# Patient Record
Sex: FEMALE | Race: WHITE | NOT HISPANIC OR LATINO | Employment: FULL TIME | URBAN - METROPOLITAN AREA
[De-identification: names, ages, dates, MRNs, and addresses within clinical notes are randomized per-mention and may not be internally consistent; named-entity substitution may affect disease eponyms.]

---

## 2017-01-13 ENCOUNTER — ALLIED HEALTH/NURSE VISIT (OUTPATIENT)
Dept: FAMILY MEDICINE | Facility: OTHER | Age: 22
End: 2017-01-13
Payer: COMMERCIAL

## 2017-01-13 VITALS — DIASTOLIC BLOOD PRESSURE: 66 MMHG | SYSTOLIC BLOOD PRESSURE: 100 MMHG

## 2017-01-13 DIAGNOSIS — Z30.9 ENCOUNTER FOR CONTRACEPTIVE MANAGEMENT, UNSPECIFIED CONTRACEPTIVE ENCOUNTER TYPE: Primary | ICD-10-CM

## 2017-01-13 PROCEDURE — 99207 ZZC NO CHARGE NURSE ONLY: CPT

## 2017-01-13 PROCEDURE — 96372 THER/PROPH/DIAG INJ SC/IM: CPT

## 2017-01-13 RX ORDER — MEDROXYPROGESTERONE ACETATE 150 MG/ML
150 INJECTION, SUSPENSION INTRAMUSCULAR
Qty: 1 ML | Refills: 3 | OUTPATIENT
Start: 2016-10-14 | End: 2017-09-18

## 2017-01-13 NOTE — NURSING NOTE
Chief Complaint   Patient presents with     Imm/Inj     depo     Prior to injection verified patient identity using patient's name and date of birth.  BLOOD PRESSURE: 100/66    DATE OF LAST PAP or ANNUAL EXAM: PAP      NIL   10/14/2016  URINE HCG:not indicated    The following medication was given:     MEDICATION: Depo Provera 150mg  ROUTE: IM  SITE: Deltoid - Left  : FloQast  LOT #: [id number]  EXPIRATION:2/2019  NEXT INJECTION DUE: 3/31-4/14  NDC: [address]-5  Provider: Stacey Bruno CMA ([facility])

## 2017-02-15 ENCOUNTER — MEDICAL CORRESPONDENCE (OUTPATIENT)
Dept: HEALTH INFORMATION MANAGEMENT | Facility: CLINIC | Age: 22
End: 2017-02-15

## 2017-02-15 ENCOUNTER — TELEPHONE (OUTPATIENT)
Dept: ENDOCRINOLOGY | Facility: CLINIC | Age: 22
End: 2017-02-15

## 2017-02-15 NOTE — TELEPHONE ENCOUNTER
CMN for Dexcom sensor faxed both Dexcom ([phone]) and [employer] ([phone]).    Anna Black RN, BSN, CDE   [facility]

## 2017-02-27 ENCOUNTER — MEDICAL CORRESPONDENCE (OUTPATIENT)
Dept: HEALTH INFORMATION MANAGEMENT | Facility: CLINIC | Age: 22
End: 2017-02-27

## 2017-03-13 ENCOUNTER — OFFICE VISIT (OUTPATIENT)
Dept: FAMILY MEDICINE | Facility: OTHER | Age: 22
End: 2017-03-13
Payer: COMMERCIAL

## 2017-03-13 ENCOUNTER — TELEPHONE (OUTPATIENT)
Dept: FAMILY MEDICINE | Facility: OTHER | Age: 22
End: 2017-03-13

## 2017-03-13 VITALS — SYSTOLIC BLOOD PRESSURE: 122 MMHG | DIASTOLIC BLOOD PRESSURE: 86 MMHG

## 2017-03-13 DIAGNOSIS — R53.81 MALAISE: ICD-10-CM

## 2017-03-13 DIAGNOSIS — J10.1 INFLUENZA A: Primary | ICD-10-CM

## 2017-03-13 LAB
FLUAV+FLUBV AG SPEC QL: ABNORMAL
FLUAV+FLUBV AG SPEC QL: POSITIVE
SPECIMEN SOURCE: ABNORMAL

## 2017-03-13 PROCEDURE — 99213 OFFICE O/P EST LOW 20 MIN: CPT | Performed by: PHYSICIAN ASSISTANT

## 2017-03-13 PROCEDURE — 87804 INFLUENZA ASSAY W/OPTIC: CPT | Performed by: PHYSICIAN ASSISTANT

## 2017-03-13 RX ORDER — OSELTAMIVIR PHOSPHATE 75 MG/1
75 CAPSULE ORAL 2 TIMES DAILY
Qty: 10 CAPSULE | Refills: 0 | Status: SHIPPED | OUTPATIENT
Start: 2017-03-13 | End: 2018-01-02

## 2017-03-13 ASSESSMENT — PAIN SCALES - GENERAL: PAINLEVEL: NO PAIN (0)

## 2017-03-13 NOTE — NURSING NOTE
"No chief complaint on file.      Initial /86 (BP Location: Right arm, Patient Position: Chair, Cuff Size: Adult Regular)  Pulse (P) 116  Temp (P) 97.8  F (36.6  C) (Oral)  Resp (P) 16  Ht (P) 5' 1.81\" (1.57 m)  Wt (P) 140 lb (63.5 kg)  SpO2 (P) 97%  BMI (P) 25.76 kg/m2 Estimated body mass index is 25.76 kg/(m^2) (pended) as calculated from the following:    Height as of this encounter: (P) 5' 1.81\" (1.57 m).    Weight as of this encounter: (P) 140 lb (63.5 kg).  Medication Reconciliation: complete  "

## 2017-03-13 NOTE — PATIENT INSTRUCTIONS
Influenza  Influenza ( the flu ) is an infection that affects your respiratory tract (the mouth, nose, and lungs, and the passages between them). Unlike a cold, the flu can make you very ill. And it can lead to pneumonia, a serious lung infection. For some people, especially older adults, young children, and people with certain chronic conditions, the flu can have serious complications and even be fatal.  What Are the Risk Factors for the Flu?     Viruses that cause influenza spread through the air in droplets when someone who has the flu coughs, sneezes, laughs, or talks.   Anyone can get the flu. But you re more likely to become infected if you:    Have a weakened immune system.    Work in a health care setting where you may be exposed to flu germs.    Live or work with someone who has the flu.    Haven t received an annual flu shot.  How Does the Flu Spread?  The flu is caused by viruses. The viruses spread through the air in droplets when someone who has the flu coughs, sneezes, laughs, or talks. You can become infected when you inhale these viruses directly. You can also become infected when you touch a surface on which the droplets have landed and then transfer the germs to your eyes, nose, or mouth. Touching used tissues, or sharing utensils, drinking glasses, or a toothbrush with an infected person can expose you to flu viruses, too.  What Are the Symptoms of the Flu?  Flu symptoms tend to come on quickly and may last a few days to a few weeks. They include:    Fever usually higher than 101 F  (38.3 C) and chills    Sore throat and headache    Dry cough    Runny nose    Tiredness and weakness    Muscle aches  Factors That Can Make Flu Worse  For some people, the flu can be very serious. The risk of complications is greater for:    Children under age 5.    Adults 65 years of age and older.    People with a chronic illness, such as diabetes or heart, kidney, or lung disease.    People who live in a nursing  home or long-term care facility.   How Is the Flu Treated?  Influenza usually improves after 7 days or so. In some cases, your health care provider may prescribe an antiviral medication. This may help you get well sooner. For the medication to help, you need to take it as soon as possible (ideally within 48 hours) after your symptoms start. If you develop pneumonia or other serious illness, hospital care may be needed.  Easing Flu Symptoms    Drink lots of fluids such as water, juice, and warm soup. A good rule is to drink enough so that you urinate your normal amount.    Get plenty of rest.    Ask your health care provider what to take for fever and pain.    Call your provider if your fever rises over 101 F (38.3 C) or you become dizzy, lightheaded, or short of breath.  Taking Steps to Protect Others    Wash your hands often, especially after coughing or sneezing. Or, clean your hands with an alcohol-based hand  containing at least 60 percent alcohol.    Cough or sneeze into a tissue. Then throw the tissue away and wash your hands. If you don t have a tissue, cough and sneeze into the crook of your elbow.    Stay home until at least 24 hours after you no longer have a fever or chills. Be sure the fever isn t being hidden by fever-reducing medication.    Don t share food, utensils, drinking glasses, or a toothbrush with others.    Ask your health care provider if others in your household should receive antiviral medication to help them avoid infection.  How Can the Flu Be Prevented?    One of the best ways to avoid the flu is to get a flu vaccination each year. Viruses that cause the flu change from year to year. For that reason, doctors recommend getting the flu vaccine each year, as soon as it's available in your area. The vaccine may be given as a shot or as a nasal spray. Your health care provider can tell you which vaccine is right for you.    Wash your hands often. Frequent handwashing is a proven way  to help prevent infection.    Carry an alcohol-based hand gel containing at least 60 percent alcohol. Use it when you don t have access to soap and water. Then wash your hands as soon as you can.    Avoid touching your eyes, nose, and mouth.    At home and work, clean phones, computer keyboards, and toys often with disinfectant wipes.    If possible, avoid close contact with others who have the flu or symptoms of the flu.  Handwashing Tips  Handwashing is one of the best ways to prevent many common infections. If you re caring for or visiting someone with the flu, wash your hands each time you enter and leave the room. Follow these steps:    Use warm water and plenty of soap. Rub your hands together well.    Clean the whole hand, under your nails, between your fingers, and up the wrists.    Wash for at least 15 seconds.    Rinse, letting the water run down your fingers, not up your wrists.    Dry your hands well. Use a paper towel to turn off the faucet and open the door.  Using Alcohol-Based Hand   Alcohol-based hand  are also a good choice. Use them when you don t have access to soap and water. Follow these steps:    Squeeze about a tablespoon of gel into the palm of one hand.    Rub your hands together briskly, cleaning the backs of your hands, the palms, between your fingers, and up the wrists.    Rub until the gel is gone and your hands are completely dry.  Preventing Influenza in Healthcare Settings  The flu is a special concern for people in hospitals and long-term care facilities. To help prevent the spread of flu, many hospitals and nursing homes take these steps:    Health care providers wash their hands or use an alcohol-based hand  before and after treating each patient.    People with the flu have private rooms and bathrooms or share a room with someone with the same infection.    High-risk patients who don t have the flu are encouraged to get the flu and pneumonia  vaccines.    All health care workers are encouraged or required to get flu shots.        5329-7719 [employer]. [address] [place] [address], [place] [address]. All rights reserved. This information is not intended as a substitute for professional medical care. Always follow your healthcare professional's instructions.        Influenza (Adult)    Influenza is also called the flu. It is a viral illness that affects the air passages of your lungs. It is different from the common cold. The flu can easily be passed from one to person to another. It may be spread through the air by coughing and sneezing. Or it can be spread by touching the sick person and then touching your own eyes, nose, or mouth.  The flu starts 1 to 3 days after you are exposed to the flu virus. It may last for 1 to 2 weeks. You usually don t need to take antibiotics unless you have a complication. This might be an ear or sinus infection or pneumonia.  Symptoms of the flu may be mild or severe. They can include extreme tiredness (wanting to stay in bed all day), chills, fevers, muscle aches, soreness with eye movement, headache, and a dry, hacking cough.  Home care  Follow these guidelines when caring for yourself at home:    Avoid being around cigarette smoke, whether yours or other people s.    Acetaminophen or ibuprofen will help ease your fever, muscle aches, and headache. Don t give aspirin to anyone younger than 18 who has the flu. Aspirin can harm the liver.    Nausea and loss of appetite are common with the flu. Eat light meals. Drink 6 to 8 glasses of liquids every day. Good choices are water, sport drinks, soft drinks without caffeine, juices, tea, and soup. Extra fluids will also help loosen secretions in your nose and lungs.    Over-the-counter cold medicines will not make the flu go away faster. But the medicines may help with coughing, sore throat, and congestion in your nose and sinuses. Don t use a decongestant if you have  high blood pressure.    Stay home until your fever has been gone for at least 24 hours without using medicine to reduce fever.  Follow-up care  Follow up with your health care provider, or as advised, if you are not getting better over the next week.  If you are 65 or older, talk with your provider about getting a pneumococcal vaccine every 5 years. You should also get this vaccine if you have chronic asthma or COPD. All adults should get a flu vaccine every fall. Ask your provider about this.  When to seek medical advice  Call your health care provider right away if any of these occur:    Cough with lots of colored sputum (mucus) or blood in your sputum    Chest pain, shortness of breath, wheezing, or difficulty breathing    Severe headache, or face, neck, or ear pain    New rash with fever    Fever of 101 F (38 C) oral or higher that doesn t get better with fever medicine    Confusion, behavior change, or seizure    Severe weakness or dizziness    You get a fever or cough after getting better for a few days       9496-0449 [employer]. [address] [place] [address], [place] [address]. All rights reserved. This information is not intended as a substitute for professional medical care. Always follow your healthcare professional's instructions.

## 2017-03-13 NOTE — MR AVS SNAPSHOT
After Visit Summary   3/13/2017    Feliciano Arreguin    MRN: [id number]           Patient Information     Date Of Birth          1995        Visit Information        Provider Department      3/13/2017 2:00 PM Debra Olivas PA-C [facility]        Today's Diagnoses     Influenza A    -  1    Malaise          Care Instructions      Influenza  Influenza ( the flu ) is an infection that affects your respiratory tract (the mouth, nose, and lungs, and the passages between them). Unlike a cold, the flu can make you very ill. And it can lead to pneumonia, a serious lung infection. For some people, especially older adults, young children, and people with certain chronic conditions, the flu can have serious complications and even be fatal.  What Are the Risk Factors for the Flu?     Viruses that cause influenza spread through the air in droplets when someone who has the flu coughs, sneezes, laughs, or talks.   Anyone can get the flu. But you re more likely to become infected if you:    Have a weakened immune system.    Work in a health care setting where you may be exposed to flu germs.    Live or work with someone who has the flu.    Haven t received an annual flu shot.  How Does the Flu Spread?  The flu is caused by viruses. The viruses spread through the air in droplets when someone who has the flu coughs, sneezes, laughs, or talks. You can become infected when you inhale these viruses directly. You can also become infected when you touch a surface on which the droplets have landed and then transfer the germs to your eyes, nose, or mouth. Touching used tissues, or sharing utensils, drinking glasses, or a toothbrush with an infected person can expose you to flu viruses, too.  What Are the Symptoms of the Flu?  Flu symptoms tend to come on quickly and may last a few days to a few weeks. They include:    Fever usually higher than 101 F  (38.3 C) and chills    Sore throat and  headache    Dry cough    Runny nose    Tiredness and weakness    Muscle aches  Factors That Can Make Flu Worse  For some people, the flu can be very serious. The risk of complications is greater for:    Children under age 5.    Adults 65 years of age and older.    People with a chronic illness, such as diabetes or heart, kidney, or lung disease.    People who live in a nursing home or long-term care facility.   How Is the Flu Treated?  Influenza usually improves after 7 days or so. In some cases, your health care provider may prescribe an antiviral medication. This may help you get well sooner. For the medication to help, you need to take it as soon as possible (ideally within 48 hours) after your symptoms start. If you develop pneumonia or other serious illness, hospital care may be needed.  Easing Flu Symptoms    Drink lots of fluids such as water, juice, and warm soup. A good rule is to drink enough so that you urinate your normal amount.    Get plenty of rest.    Ask your health care provider what to take for fever and pain.    Call your provider if your fever rises over 101 F (38.3 C) or you become dizzy, lightheaded, or short of breath.  Taking Steps to Protect Others    Wash your hands often, especially after coughing or sneezing. Or, clean your hands with an alcohol-based hand  containing at least 60 percent alcohol.    Cough or sneeze into a tissue. Then throw the tissue away and wash your hands. If you don t have a tissue, cough and sneeze into the crook of your elbow.    Stay home until at least 24 hours after you no longer have a fever or chills. Be sure the fever isn t being hidden by fever-reducing medication.    Don t share food, utensils, drinking glasses, or a toothbrush with others.    Ask your health care provider if others in your household should receive antiviral medication to help them avoid infection.  How Can the Flu Be Prevented?    One of the best ways to avoid the flu is to get a  flu vaccination each year. Viruses that cause the flu change from year to year. For that reason, doctors recommend getting the flu vaccine each year, as soon as it's available in your area. The vaccine may be given as a shot or as a nasal spray. Your health care provider can tell you which vaccine is right for you.    Wash your hands often. Frequent handwashing is a proven way to help prevent infection.    Carry an alcohol-based hand gel containing at least 60 percent alcohol. Use it when you don t have access to soap and water. Then wash your hands as soon as you can.    Avoid touching your eyes, nose, and mouth.    At home and work, clean phones, computer keyboards, and toys often with disinfectant wipes.    If possible, avoid close contact with others who have the flu or symptoms of the flu.  Handwashing Tips  Handwashing is one of the best ways to prevent many common infections. If you re caring for or visiting someone with the flu, wash your hands each time you enter and leave the room. Follow these steps:    Use warm water and plenty of soap. Rub your hands together well.    Clean the whole hand, under your nails, between your fingers, and up the wrists.    Wash for at least 15 seconds.    Rinse, letting the water run down your fingers, not up your wrists.    Dry your hands well. Use a paper towel to turn off the faucet and open the door.  Using Alcohol-Based Hand   Alcohol-based hand  are also a good choice. Use them when you don t have access to soap and water. Follow these steps:    Squeeze about a tablespoon of gel into the palm of one hand.    Rub your hands together briskly, cleaning the backs of your hands, the palms, between your fingers, and up the wrists.    Rub until the gel is gone and your hands are completely dry.  Preventing Influenza in Healthcare Settings  The flu is a special concern for people in hospitals and long-term care facilities. To help prevent the spread of flu, many  hospitals and nursing homes take these steps:    Health care providers wash their hands or use an alcohol-based hand  before and after treating each patient.    People with the flu have private rooms and bathrooms or share a room with someone with the same infection.    High-risk patients who don t have the flu are encouraged to get the flu and pneumonia vaccines.    All health care workers are encouraged or required to get flu shots.        0829-4426 The "[employer]". [address] [place] [address], [place] [address]. All rights reserved. This information is not intended as a substitute for professional medical care. Always follow your healthcare professional's instructions.        Influenza (Adult)    Influenza is also called the flu. It is a viral illness that affects the air passages of your lungs. It is different from the common cold. The flu can easily be passed from one to person to another. It may be spread through the air by coughing and sneezing. Or it can be spread by touching the sick person and then touching your own eyes, nose, or mouth.  The flu starts 1 to 3 days after you are exposed to the flu virus. It may last for 1 to 2 weeks. You usually don t need to take antibiotics unless you have a complication. This might be an ear or sinus infection or pneumonia.  Symptoms of the flu may be mild or severe. They can include extreme tiredness (wanting to stay in bed all day), chills, fevers, muscle aches, soreness with eye movement, headache, and a dry, hacking cough.  Home care  Follow these guidelines when caring for yourself at home:    Avoid being around cigarette smoke, whether yours or other people s.    Acetaminophen or ibuprofen will help ease your fever, muscle aches, and headache. Don t give aspirin to anyone younger than 18 who has the flu. Aspirin can harm the liver.    Nausea and loss of appetite are common with the flu. Eat light meals. Drink 6 to 8 glasses of liquids every day. Good  choices are water, sport drinks, soft drinks without caffeine, juices, tea, and soup. Extra fluids will also help loosen secretions in your nose and lungs.    Over-the-counter cold medicines will not make the flu go away faster. But the medicines may help with coughing, sore throat, and congestion in your nose and sinuses. Don t use a decongestant if you have high blood pressure.    Stay home until your fever has been gone for at least 24 hours without using medicine to reduce fever.  Follow-up care  Follow up with your health care provider, or as advised, if you are not getting better over the next week.  If you are 65 or older, talk with your provider about getting a pneumococcal vaccine every 5 years. You should also get this vaccine if you have chronic asthma or COPD. All adults should get a flu vaccine every fall. Ask your provider about this.  When to seek medical advice  Call your health care provider right away if any of these occur:    Cough with lots of colored sputum (mucus) or blood in your sputum    Chest pain, shortness of breath, wheezing, or difficulty breathing    Severe headache, or face, neck, or ear pain    New rash with fever    Fever of 101 F (38 C) oral or higher that doesn t get better with fever medicine    Confusion, behavior change, or seizure    Severe weakness or dizziness    You get a fever or cough after getting better for a few days       1591-6348 [employer]). [address] [place] [address], [place] [id number]. All rights reserved. This information is not intended as a substitute for professional medical care. Always follow your healthcare professional's instructions.              Follow-ups after your visit        Who to contact     If you have questions or need follow up information about today's clinic visit or your schedule please contact [facility] directly at [phone].  Normal or non-critical lab and imaging results will be communicated to you by  MyChart, letter or phone within 4 business days after the clinic has received the results. If you do not hear from us within 7 days, please contact the clinic through [employer] or phone. If you have a critical or abnormal lab result, we will notify you by phone as soon as possible.  Submit refill requests through [employer] or call your pharmacy and they will forward the refill request to us. Please allow 3 business days for your refill to be completed.          Additional Information About Your Visit        [employer] Information     [employer] gives you secure access to your electronic health record. If you see a primary care provider, you can also send messages to your care team and make appointments. If you have questions, please call your primary care clinic.  If you do not have a primary care provider, please call [phone] and they will assist you.        Care EveryWhere ID     This is your Care EveryWhere ID. This could be used by other organizations to access your [place] medical records  [id number]         Blood Pressure from Last 3 Encounters:   03/13/17 122/86   01/13/17 100/66   10/14/16 118/70    Weight from Last 3 Encounters:   03/13/17 (P) 140 lb (63.5 kg)   10/14/16 135 lb (61.2 kg)   07/26/16 135 lb 1.6 oz (61.3 kg)              We Performed the Following     Influenza A/B antigen          Today's Medication Changes          These changes are accurate as of: 3/13/17  2:44 PM.  If you have any questions, ask your nurse or doctor.               Start taking these medicines.        Dose/Directions    oseltamivir 75 MG capsule   Commonly known as:  TAMIFLU   Used for:  Influenza A   Started by:  Debra Olivas PA-C        Dose:  75 mg   Take 1 capsule (75 mg) by mouth 2 times daily   Quantity:  10 capsule   Refills:  0            Where to get your medicines      These medications were sent to [facility] [address] [place] [id number] [facility]  [address] [facility] [address]  "    Phone:  [phone]     oseltamivir 75 MG capsule                Primary Care Provider Office Phone # Fax #    Stacey Sandoval PA-C [phone]       [facility] [address] [place] [address]        Thank you!     Thank you for choosing [facility]  for your care. Our goal is always to provide you with excellent care. Hearing back from our patients is one way we can continue to improve our services. Please take a few minutes to complete the written survey that you may receive in the mail after your visit with us. Thank you!             Your Updated Medication List - Protect others around you: Learn how to safely use, store and throw away your medicines at www.disposemymeds.org.          This list is accurate as of: 3/13/17  2:44 PM.  Always use your most recent med list.                   Brand Name Dispense Instructions for use    ACCU-CHEK ENE test strip   Generic drug:  blood glucose monitoring     400 Box    Use to test blood sugars up to 4 times daily.       blood glucose monitoring meter device kit    no brand specified    1 kit    1 kit 3 times daily.       Blood Pressure Kit     1 kit    1 kit daily       insulin glargine 100 UNIT/ML injection    LANTUS SOLOSTAR    3 Month    42 units daily       * insulin lispro 100 UNIT/ML injection    HumaLOG KWIKpen    3 Month    Inject 1 U per 5 grams CHO. Total daily dose ~ 40 U.       * insulin lispro 100 UNIT/ML injection    humaLOG    8 vial    Uses up to 75 units per day in insulin pump for basal, bolus, and priming of insulin pump tubing.       insulin pen needle 31G X 8 MM    B-D U/F    200 each    Use 6 daily or as directed.       insulin syringe-needle U-100 31G X 5/16\" 0.5 ML    GNP ULTRA COM INSULIN SYRINGE    720 each    Any brand, to be used with subcutaneous insulin injection 4-8 times/day.       medroxyPROGESTERone 150 MG/ML injection    DEPO-PROVERA    1 mL    Inject 1 mL (150 mg) into the muscle " every 3 months       oseltamivir 75 MG capsule    TAMIFLU    10 capsule    Take 1 capsule (75 mg) by mouth 2 times daily       * Notice:  This list has 2 medication(s) that are the same as other medications prescribed for you. Read the directions carefully, and ask your doctor or other care provider to review them with you.

## 2017-03-13 NOTE — PROGRESS NOTES
"  SUBJECTIVE:                                                    Feliciano Arreguin is a 21 year old female who presents to clinic today for the following health issues:      HPI    Acute Illness   Acute illness concerns: flu sx's  Onset: friday    Fever: YES- low grade    Chills/Sweats: YES    Headache (location?): YES    Sinus Pressure:YES    Conjunctivitis:  no    Ear Pain: no    Rhinorrhea: YES    Congestion: YES    Sore Throat: YES- from runny nose      - BGs since sick <200   - Checking frequently   - Drinking fluids, not much food   - No flu shot      Cough: YES-non-productive    Wheeze: no    Decreased Appetite: YES    Nausea: YES    Vomiting: no     Diarrhea:  no    Dysuria/Freq.: no    Fatigue/Achiness: YES    Sick/Strep Exposure: YES- boyfriend had a cold     Therapies Tried and outcome: dayquil    Problem list and histories reviewed & adjusted, as indicated.  Additional history: as documented    Labs reviewed in EPIC    ROS:  Constitutional, HEENT, cardiovascular, pulmonary, gi and gu systems are negative, except as otherwise noted.    OBJECTIVE:                                                    /86 (BP Location: Right arm, Patient Position: Chair, Cuff Size: Adult Regular)  Pulse (P) 116  Temp (P) 97.8  F (36.6  C) (Oral)  Resp (P) 16  Ht (P) 5' 1.81\" (1.57 m)  Wt (P) 140 lb (63.5 kg)  SpO2 (P) 97%  BMI (P) 25.76 kg/m2  Body mass index is 25.76 kg/(m^2) (pended).  GENERAL APPEARANCE: ill appearing, alert and no distress  EYES: Eyes grossly normal to inspection, PERRLA, conjunctivae and sclerae without injection or discharge, EOM intact   HENT: Bilateral ear canals without erythema or cerumen, bilateral TM's pearly grey with normal light reflex, no effusion, injection, or bulging, nasal turbinates without swelling, erythema, or discharge, mouth without ulcers or lesions, oropharynx clear and oral mucous membranes moist, no sinus tenderness   NECK: No adenopathy in cervical or supraclavicular " regions  RESP: Lungs clear to auscultation - no rales, rhonchi or wheezes   CV: Regular rates and rhythm, normal S1 S2, no S3 or S4, no murmur, click or rub  MS: No musculoskeletal defects are noted and gait is age appropriate without ataxia   SKIN: No suspicious lesions or rashes, hydration status appears adeuqate with normal skin turgor    PSYCH: Alert and oriented x3; speech- coherent , normal rate and volume; able to articulate logical thoughts, able to abstract reason, no tangential thoughts, no hallucinations or delusions, mentation appears normal, Mood is euthymic. Affect is appropriate for this mood state and bright. Thought content is free of suicidal ideation, hallucinations, and delusions. Dress is adequate and upkept. Eye contact is good during conversation.       Diagnostic Test Results:  Results for orders placed or performed in visit on 03/13/17 (from the past 24 hour(s))   Influenza A/B antigen   Result Value Ref Range    Influenza A/B Agn Specimen B*     Influenza A Positive (A) NEG    Influenza B  NEG     Negative   Test results must be correlated with clinical data. If necessary, results   should be confirmed by a molecular assay or viral culture.          ASSESSMENT/PLAN:                                                        ICD-10-CM    1. Influenza A J10.1 oseltamivir (TAMIFLU) 75 MG capsule   2. Malaise R53.81 Influenza A/B antigen     - Discussed lab findings   - Discussed Tamiflu use, duration, and side effects   - Conservative cares as needed   - Hand outs given  - Continue to check BGs frequently   - Discussed if signs of dehydration or uncontrolled BGs go to ED     The patient indicates understanding of these issues and agrees with the plan.    Follow up: PRN           Debra Olivas PA-C  [employer]

## 2017-03-13 NOTE — TELEPHONE ENCOUNTER
"Feliciano Arreguin is a 21 year old female who calls with cold and LUKE symptoms.    NURSING ASSESSMENT:  Description:  Patient calling today with stuffy nose, post nasal drip, intermittent sore throat, body aches, nausea, and \"hot flashes\". Afebrile with temp 99.2 yesterday. Drinking water - last urination was \"a couple hours ago\".   Onset/duration: Friday  Precip. factors:  Boyfriend had a cold  Associated symptoms:  See above  Improves/worsens symptoms:  Dayquil and water  Allergies:   Allergies   Allergen Reactions     Nkda [No Known Drug Allergies]      Seasonal Allergies      Spring/Fall       RECOMMENDED DISPOSITION:  Home care advice - Patient requested appointment as it \"doesn't feel like a regular cold\". Patient will be seen at [facility] in [place]  Will comply with recommendation: YES   If further questions/concerns or if Sx do not improve, worsen or new Sx develop, call your PCP or [facility] as soon as possible.    NOTES:  Disposition was determined by the first positive assessment question, therefore all previous assessment questions were negative.     Guideline used:  Telephone Triage Protocols for Nurses, Fourth Edition, Ashley José, ESVIN, BSN      "

## 2017-03-13 NOTE — TELEPHONE ENCOUNTER
Reason for call:  Same Day Appointment  Reason for Call:  Same Day Appointment, Requested Provider:  anyone in  or     PCP: Stacey Sandoval    Reason for visit: sore throat, stuffy nose     Duration of symptoms: 2days     Have you been treated for this in the past? No    Additional comments: Would like to be seen in  or  with anyone     Can we leave a detailed message on this number? YES    Phone number patient can be reached at: Home number on file [phone] (home)    Best Time: anytime    Call taken on 3/13/2017 at 9:31 AM by Ly Hatch

## 2017-04-04 ENCOUNTER — ALLIED HEALTH/NURSE VISIT (OUTPATIENT)
Dept: FAMILY MEDICINE | Facility: CLINIC | Age: 22
End: 2017-04-04
Payer: COMMERCIAL

## 2017-04-04 VITALS — DIASTOLIC BLOOD PRESSURE: 80 MMHG | SYSTOLIC BLOOD PRESSURE: 122 MMHG | WEIGHT: 138 LBS | BODY MASS INDEX: 25.4 KG/M2

## 2017-04-04 PROCEDURE — 96372 THER/PROPH/DIAG INJ SC/IM: CPT

## 2017-04-04 NOTE — MR AVS SNAPSHOT
After Visit Summary   4/4/2017    Feliciano Arreguin    MRN: [id number]           Patient Information     Date Of Birth          1995        Visit Information        Provider Department      4/4/2017 2:30 PM NL FLOAT TEAM D [facility]        Today's Diagnoses     Contraception    -  1       Follow-ups after your visit        Who to contact     If you have questions or need follow up information about today's clinic visit or your schedule please contact [facility] directly at [phone].  Normal or non-critical lab and imaging results will be communicated to you by [email or web address], letter or phone within 4 business days after the clinic has received the results. If you do not hear from us within 7 days, please contact the clinic through [email or web address] or phone. If you have a critical or abnormal lab result, we will notify you by phone as soon as possible.  Submit refill requests through [employer] or call your pharmacy and they will forward the refill request to us. Please allow 3 business days for your refill to be completed.          Additional Information About Your Visit        MyChart Information     [employer] gives you secure access to your electronic health record. If you see a primary care provider, you can also send messages to your care team and make appointments. If you have questions, please call your primary care clinic.  If you do not have a primary care provider, please call [phone] and they will assist you.        Care EveryWhere ID     This is your Care EveryWhere ID. This could be used by other organizations to access your [place] medical records  [id number]        Your Vitals Were     BMI (Body Mass Index)                   25.4 kg/m2            Blood Pressure from Last 3 Encounters:   04/04/17 122/80   03/13/17 122/86   01/13/17 100/66    Weight from Last 3 Encounters:   04/04/17 138 lb (62.6 kg)   03/13/17 (P) 140 lb (63.5 kg)   10/14/16 135 lb (61.2 kg)     "          We Performed the Following     INJECTION INTRAMUSCULAR OR SUB-Q     Medroxyprogesterone inj  1mg   (Depo Provera J-Code)        Primary Care Provider Office Phone # Fax #    Stacey Sandoval PA-C [phone]       [facility] [address] [place] [address]        Thank you!     Thank you for choosing [facility]  for your care. Our goal is always to provide you with excellent care. Hearing back from our patients is one way we can continue to improve our services. Please take a few minutes to complete the written survey that you may receive in the mail after your visit with us. Thank you!             Your Updated Medication List - Protect others around you: Learn how to safely use, store and throw away your medicines at www.disposemymeds.org.          This list is accurate as of: 4/4/17  2:47 PM.  Always use your most recent med list.                   Brand Name Dispense Instructions for use    ACCU-CHEK ENE test strip   Generic drug:  blood glucose monitoring     400 Box    Use to test blood sugars up to 4 times daily.       blood glucose monitoring meter device kit    no brand specified    1 kit    1 kit 3 times daily.       Blood Pressure Kit     1 kit    1 kit daily       insulin glargine 100 UNIT/ML injection    LANTUS SOLOSTAR    3 Month    42 units daily       * insulin lispro 100 UNIT/ML injection    HumaLOG KWIKpen    3 Month    Inject 1 U per 5 grams CHO. Total daily dose ~ 40 U.       * insulin lispro 100 UNIT/ML injection    humaLOG    8 vial    Uses up to 75 units per day in insulin pump for basal, bolus, and priming of insulin pump tubing.       insulin pen needle 31G X 8 MM    B-D U/F    200 each    Use 6 daily or as directed.       insulin syringe-needle U-100 31G X 5/16\" 0.5 ML    GNP ULTRA COM INSULIN SYRINGE    720 each    Any brand, to be used with subcutaneous insulin injection 4-8 times/day.       medroxyPROGESTERone 150 MG/ML " injection    DEPO-PROVERA    1 mL    Inject 1 mL (150 mg) into the muscle every 3 months       oseltamivir 75 MG capsule    TAMIFLU    10 capsule    Take 1 capsule (75 mg) by mouth 2 times daily       * Notice:  This list has 2 medication(s) that are the same as other medications prescribed for you. Read the directions carefully, and ask your doctor or other care provider to review them with you.

## 2017-04-04 NOTE — NURSING NOTE
Prior to injection verified patient identity using patient's name and date of birth.  Per orders of Stacey Sandoval  injection of Depo  given by Briana Valles MA. Patient instructed to remain in clinic for 20 minutes afterwards and to report any adverse reaction to me immediately.

## 2017-04-10 ENCOUNTER — MYC MEDICAL ADVICE (OUTPATIENT)
Dept: FAMILY MEDICINE | Facility: OTHER | Age: 22
End: 2017-04-10

## 2017-04-10 NOTE — LETTER
[facility]  [address] [place] [address]   [id number]  [phone]  Fax [phone]       April 10, 2017       RE Feliciano Arreguin     Dear TSA,     Feliciano Arreguin is an insulin dependent diabetic.  She uses an insulin pump.  She also will have some pump supplies, insulin, and syringes with needles.      Sincerely,        Stacey Sandoval PA-C

## 2017-06-22 ENCOUNTER — ALLIED HEALTH/NURSE VISIT (OUTPATIENT)
Dept: FAMILY MEDICINE | Facility: OTHER | Age: 22
End: 2017-06-22
Payer: COMMERCIAL

## 2017-06-22 PROCEDURE — 99207 ZZC NO CHARGE NURSE ONLY: CPT

## 2017-06-22 PROCEDURE — 96372 THER/PROPH/DIAG INJ SC/IM: CPT

## 2017-06-22 NOTE — NURSING NOTE
BLOOD PRESSURE: Data Unavailable    DATE OF LAST PAP or ANNUAL EXAM:   Lab Results   Component Value Date    PAP NIL 10/14/2016     URINE HCG:not indicated    The following medication was given:     MEDICATION: Depo Provera 150mg  ROUTE: IM  SITE: Deltoid - Right  : My [employer]  LOT #: [id number]   EXPIRATION:12/2019  NEXT INJECTION DUE: 9/7/2017 - 9/21/2017  Provider: PORSHA Sandoval

## 2017-06-22 NOTE — MR AVS SNAPSHOT
After Visit Summary   6/22/2017    Feliciano Arreguin    MRN: [id number]           Patient Information     Date Of Birth          1995        Visit Information        Provider Department      6/22/2017 1:30 PM NL FLOPJ NURSE [facility]        Today's Diagnoses     Contraception    -  1       Follow-ups after your visit        Who to contact     If you have questions or need follow up information about today's clinic visit or your schedule please contact [facility] directly at [phone].  Normal or non-critical lab and imaging results will be communicated to you by [employer]hart, letter or phone within 4 business days after the clinic has received the results. If you do not hear from us within 7 days, please contact the clinic through "[employer]t or phone. If you have a critical or abnormal lab result, we will notify you by phone as soon as possible.  Submit refill requests through [employer] or call your pharmacy and they will forward the refill request to us. Please allow 3 business days for your refill to be completed.          Additional Information About Your Visit        MyChart Information     [employer] gives you secure access to your electronic health record. If you see a primary care provider, you can also send messages to your care team and make appointments. If you have questions, please call your primary care clinic.  If you do not have a primary care provider, please call [phone] and they will assist you.        Care EveryWhere ID     This is your Care EveryWhere ID. This could be used by other organizations to access your [place] medical records  [id number]         Blood Pressure from Last 3 Encounters:   04/04/17 122/80   03/13/17 122/86   01/13/17 100/66    Weight from Last 3 Encounters:   04/04/17 138 lb (62.6 kg)   03/13/17 (P) 140 lb (63.5 kg)   10/14/16 135 lb (61.2 kg)              We Performed the Following     Medroxyprogesterone inj/1mg (Depo  Provera J-Priscila)        Primary Care Provider Office Phone # Fax #    Stacey Sandoval PA-C [phone]       [facility] [address] [place] [address]        Equal Access to Services     MIL CARPIO : Hadii aad ku hadasho Soomaali, waaxda luqadaha, qaybta kaalmada adeegyada, waxkerline idiin hayaan adepreet myers lanattymaribeth yamilet. So [facility] [phone].    ATENCIÓN: Si habla español, tiene a theodore disposición servicios gratuitos de asistencia lingüística. Llame al [phone].    We comply with applicable federal civil rights laws and [place] laws. We do not discriminate on the basis of race, color, national origin, age, disability sex, sexual orientation or gender identity.            Thank you!     Thank you for choosing [facility]  for your care. Our goal is always to provide you with excellent care. Hearing back from our patients is one way we can continue to improve our services. Please take a few minutes to complete the written survey that you may receive in the mail after your visit with us. Thank you!             Your Updated Medication List - Protect others around you: Learn how to safely use, store and throw away your medicines at www.disposemymeds.org.          This list is accurate as of: 6/22/17  1:59 PM.  Always use your most recent med list.                   Brand Name Dispense Instructions for use Diagnosis    ACCU-CHEK ENE test strip   Generic drug:  blood glucose monitoring     400 Box    Use to test blood sugars up to 4 times daily.    Type 1 diabetes mellitus, uncontrolled (H)       blood glucose monitoring meter device kit    no brand specified    1 kit    1 kit 3 times daily.    Type 1 diabetes, HbA1c goal < 7% (H)       Blood Pressure Kit     1 kit    1 kit daily    Secondary hypertension, unspecified       insulin glargine 100 UNIT/ML injection    LANTUS SOLOSTAR    3 Month    42 units daily    Type 1 diabetes, HbA1c goal < 7% (H)       * insulin lispro  "100 UNIT/ML injection    HumaLOG ALEJANDRApen    3 Month    Inject 1 U per 5 grams CHO. Total daily dose ~ 40 U.    Type 1 diabetes, HbA1c goal < 7% (H)       * insulin lispro 100 UNIT/ML injection    humaLOG    8 vial    Uses up to 75 units per day in insulin pump for basal, bolus, and priming of insulin pump tubing.    Type 1 diabetes mellitus not at goal (H)       insulin pen needle 31G X 8 MM    B-D U/F    200 each    Use 6 daily or as directed.    Type 1 diabetes, HbA1c goal < 7% (H)       insulin syringe-needle U-100 31G X 5/16\" 0.5 ML    GNP ULTRA COM INSULIN SYRINGE    720 each    Any brand, to be used with subcutaneous insulin injection 4-8 times/day.    Type 1 diabetes, HbA1c goal < 7% (H)       medroxyPROGESTERone 150 MG/ML injection    DEPO-PROVERA    1 mL    Inject 1 mL (150 mg) into the muscle every 3 months    Encounter for contraceptive management, unspecified contraceptive encounter type       oseltamivir 75 MG capsule    TAMIFLU    10 capsule    Take 1 capsule (75 mg) by mouth 2 times daily    Influenza A       * Notice:  This list has 2 medication(s) that are the same as other medications prescribed for you. Read the directions carefully, and ask your doctor or other care provider to review them with you.      "

## 2017-07-11 ENCOUNTER — TELEPHONE (OUTPATIENT)
Dept: ENDOCRINOLOGY | Facility: CLINIC | Age: 22
End: 2017-07-11

## 2017-07-11 NOTE — TELEPHONE ENCOUNTER
Left vm advising pt we will sign form from [employer] [facility] for sensor refills but pt needs to call clinic to schedule an appt as it has been 1 yr since we last saw pt. Last visit was 07/26/17. Pt advised we will be nnable to do future refills until pt is seen in clinic.      Anna Black RN, BSN, CDE   [facility]

## 2017-09-18 ENCOUNTER — TRANSFERRED RECORDS (OUTPATIENT)
Dept: HEALTH INFORMATION MANAGEMENT | Facility: CLINIC | Age: 22
End: 2017-09-18

## 2017-09-18 ENCOUNTER — TELEPHONE (OUTPATIENT)
Dept: FAMILY MEDICINE | Facility: CLINIC | Age: 22
End: 2017-09-18

## 2017-09-18 DIAGNOSIS — Z30.42 ENCOUNTER FOR SURVEILLANCE OF INJECTABLE CONTRACEPTIVE: Primary | ICD-10-CM

## 2017-09-18 RX ORDER — MEDROXYPROGESTERONE ACETATE 150 MG/ML
150 INJECTION, SUSPENSION INTRAMUSCULAR
Qty: 1 ML | Refills: 0 | OUTPATIENT
Start: 2017-09-18 | End: 2019-02-14

## 2017-09-18 NOTE — TELEPHONE ENCOUNTER
"Patient is on the float schedule for Thursday 09/21 for her Depo injection. There are no current orders for this.    Will you please place order for depo for one time only? She is due for physical around 10/2017.    Last order placed 10/14/2016 for 1 ML with \"three refills.\" Last OV 03/13/2017 with CDL for acute illness.    Please review/advise. Depo order pended.  Alicia Vickers CMA    "

## 2017-09-21 ENCOUNTER — ALLIED HEALTH/NURSE VISIT (OUTPATIENT)
Dept: FAMILY MEDICINE | Facility: OTHER | Age: 22
End: 2017-09-21
Payer: COMMERCIAL

## 2017-09-21 VITALS — DIASTOLIC BLOOD PRESSURE: 68 MMHG | BODY MASS INDEX: 25.58 KG/M2 | SYSTOLIC BLOOD PRESSURE: 128 MMHG | WEIGHT: 139 LBS

## 2017-09-21 PROCEDURE — 96372 THER/PROPH/DIAG INJ SC/IM: CPT

## 2017-09-21 PROCEDURE — 99207 ZZC NO CHARGE LOS: CPT

## 2017-09-21 NOTE — NURSING NOTE
Discharged home. Doing well.    Pt came in for depo injection.  BP was elevated when first taken.  Will have pt wait 5-10 minutes and recheck.  Verito Velasquez, CMA

## 2017-09-21 NOTE — NURSING NOTE
Prior to injection verified patient identity using patient's name and date of birth.Patient instructed to remain in clinic for 20 minutes afterwards, and to report any adverse reaction to me immediately. Verito Velasquez CMA

## 2017-09-21 NOTE — MR AVS SNAPSHOT
After Visit Summary   9/21/2017    Feliciano Arreguin    MRN: [id number]           Patient Information     Date Of Birth          1995        Visit Information        Provider Department      9/21/2017 4:00 PM VIVIEN BARROW TEAM B, [facility]        Today's Diagnoses     Contraception    -  1       Follow-ups after your visit        Who to contact     If you have questions or need follow up information about today's clinic visit or your schedule please contact [facility] directly at [phone].  Normal or non-critical lab and imaging results will be communicated to you by [employer]hart, letter or phone within 4 business days after the clinic has received the results. If you do not hear from us within 7 days, please contact the clinic through [employer] or phone. If you have a critical or abnormal lab result, we will notify you by phone as soon as possible.  Submit refill requests through [employer] or call your pharmacy and they will forward the refill request to us. Please allow 3 business days for your refill to be completed.          Additional Information About Your Visit        MyChart Information     [employer] gives you secure access to your electronic health record. If you see a primary care provider, you can also send messages to your care team and make appointments. If you have questions, please call your primary care clinic.  If you do not have a primary care provider, please call [phone] and they will assist you.        Care EveryWhere ID     This is your Care EveryWhere ID. This could be used by other organizations to access your [place] medical records  [id number]        Your Vitals Were     BMI (Body Mass Index)                   25.58 kg/m2            Blood Pressure from Last 3 Encounters:   09/21/17 128/68   04/04/17 122/80   03/13/17 122/86    Weight from Last 3 Encounters:   09/21/17 139 lb (63 kg)   04/04/17 138 lb (62.6 kg)   03/13/17 (P) 140 lb (63.5 kg)               We Performed the Following     INJECTION INTRAMUSCULAR OR SUB-Q     Medroxyprogesterone inj/1mg (Depo Provera J-Code)        Primary Care Provider Office Phone # Fax #    Stacey Sandoval PA-C [phone] [address] [place] [address]        Equal Access to Services     [facility]: Hadii aad ku hadasho Soomaali, waaxda luqadaha, qaybta kaalmada adeegyada, waxay idiin hayaan adepreet ymers lanattyn . So [facility] [phone].    ATENCIÓN: Si habla español, tiene a theodore disposición servicios gratuitos de asistencia lingüística. Llame al [phone].    We comply with applicable federal civil rights laws and [place] laws. We do not discriminate on the basis of race, color, national origin, age, disability sex, sexual orientation or gender identity.            Thank you!     Thank you for choosing [facility] [place] [facility]  for your care. Our goal is always to provide you with excellent care. Hearing back from our patients is one way we can continue to improve our services. Please take a few minutes to complete the written survey that you may receive in the mail after your visit with us. Thank you!             Your Updated Medication List - Protect others around you: Learn how to safely use, store and throw away your medicines at www.disposemymeds.org.          This list is accurate as of: 9/21/17  6:43 PM.  Always use your most recent med list.                   Brand Name Dispense Instructions for use Diagnosis    ACCU-CHEK ENE test strip   Generic drug:  blood glucose monitoring     400 Box    Use to test blood sugars up to 4 times daily.    Type 1 diabetes mellitus, uncontrolled (H)       blood glucose monitoring meter device kit    no brand specified    1 kit    1 kit 3 times daily.    Type 1 diabetes, HbA1c goal < 7% (H)       Blood Pressure Kit     1 kit    1 kit daily    Secondary hypertension, unspecified       insulin glargine 100 UNIT/ML injection    LANTUS SOLOSTAR     "3 Month    42 units daily    Type 1 diabetes, HbA1c goal < 7% (H)       * insulin lispro 100 UNIT/ML injection    HumaLOG KWIKpen    3 Month    Inject 1 U per 5 grams CHO. Total daily dose ~ 40 U.    Type 1 diabetes, HbA1c goal < 7% (H)       * insulin lispro 100 UNIT/ML injection    humaLOG    8 vial    Uses up to 75 units per day in insulin pump for basal, bolus, and priming of insulin pump tubing.    Type 1 diabetes mellitus not at goal (H)       insulin pen needle 31G X 8 MM    B-D U/F    200 each    Use 6 daily or as directed.    Type 1 diabetes, HbA1c goal < 7% (H)       insulin syringe-needle U-100 31G X 5/16\" 0.5 ML    GNP ULTRA COM INSULIN SYRINGE    720 each    Any brand, to be used with subcutaneous insulin injection 4-8 times/day.    Type 1 diabetes, HbA1c goal < 7% (H)       medroxyPROGESTERone 150 MG/ML injection    DEPO-PROVERA    1 mL    Inject 1 mL (150 mg) into the muscle every 3 months    Encounter for surveillance of injectable contraceptive       oseltamivir 75 MG capsule    TAMIFLU    10 capsule    Take 1 capsule (75 mg) by mouth 2 times daily    Influenza A       * Notice:  This list has 2 medication(s) that are the same as other medications prescribed for you. Read the directions carefully, and ask your doctor or other care provider to review them with you.      "

## 2017-09-22 NOTE — TELEPHONE ENCOUNTER
Noted. Patient had Depo on 09/21. Needs Physical before any additional refills.  Alicia Vickers, CMA

## 2017-10-03 DIAGNOSIS — E10.9 TYPE 1 DIABETES MELLITUS NOT AT GOAL (H): ICD-10-CM

## 2017-10-03 NOTE — TELEPHONE ENCOUNTER
Left message for patient to return call.    Last Appt 7/26/16.  Future: NONE    Brandy Rodriguez LPN  Adult Endocrinology  [facility]

## 2017-10-03 NOTE — TELEPHONE ENCOUNTER
----- Message from Daksha Rangel RN sent at 10/3/2017 12:27 PM CDT -----  Regarding: refill request   MG patient   Will file in chart as: HUMALOG 100 UNIT/ML injection  USES UP TO 75 UNITS PER DAY IN INSULIN PUMP FOR BASAL, BOLUS, AND PRIMING OF INSULIN PUMP TUBING  Disp: Not specified (Pharmacy requested 80 each)  Refills: 3    Class: E-Prescribe Start: 10/3/2017  For: Type 1 diabetes mellitus not at goal (H)  Originally ordered: 1 year ago by Eda De Dios MD  Last refill:6/19/2017  To be filled at: [facility] [address] [place] [address] [facility]: [phone]

## 2017-10-30 ENCOUNTER — TELEPHONE (OUTPATIENT)
Dept: FAMILY MEDICINE | Facility: OTHER | Age: 22
End: 2017-10-30

## 2017-10-30 ENCOUNTER — ALLIED HEALTH/NURSE VISIT (OUTPATIENT)
Dept: FAMILY MEDICINE | Facility: OTHER | Age: 22
End: 2017-10-30
Payer: COMMERCIAL

## 2017-10-30 DIAGNOSIS — Z86.19 HISTORY OF CHICKEN POX: Primary | ICD-10-CM

## 2017-10-30 DIAGNOSIS — Z23 NEED FOR VACCINATION: ICD-10-CM

## 2017-10-30 DIAGNOSIS — Z11.1 SCREENING EXAMINATION FOR PULMONARY TUBERCULOSIS: ICD-10-CM

## 2017-10-30 PROCEDURE — 99207 ZZC NO CHARGE NURSE ONLY: CPT

## 2017-10-30 PROCEDURE — 90715 TDAP VACCINE 7 YRS/> IM: CPT

## 2017-10-30 PROCEDURE — 90471 IMMUNIZATION ADMIN: CPT

## 2017-10-30 PROCEDURE — 86480 TB TEST CELL IMMUN MEASURE: CPT | Performed by: PHYSICIAN ASSISTANT

## 2017-10-30 PROCEDURE — 86787 VARICELLA-ZOSTER ANTIBODY: CPT | Performed by: PHYSICIAN ASSISTANT

## 2017-10-30 PROCEDURE — 36415 COLL VENOUS BLD VENIPUNCTURE: CPT | Performed by: PHYSICIAN ASSISTANT

## 2017-10-30 NOTE — NURSING NOTE
Chief Complaint   Patient presents with     Imm/Inj     tdap     Prior to injection verified patient identity using patient's name and date of birth.  Screening Questionnaire for Adult Immunization    Are you sick today?   No   Do you have allergies to medications, food, a vaccine component or latex?   No   Have you ever had a serious reaction after receiving a vaccination?   No   Do you have a long-term health problem with heart disease, lung disease, asthma, kidney disease, metabolic disease (e.g. diabetes), anemia, or other blood disorder?   No   Do you have cancer, leukemia, HIV/AIDS, or any other immune system problem?   No   In the past 3 months, have you taken medications that affect  your immune system, such as prednisone, other steroids, or anticancer drugs; drugs for the treatment of rheumatoid arthritis, Crohn s disease, or psoriasis; or have you had radiation treatments?   Yes   Have you had a seizure, or a brain or other nervous system problem?   No   During the past year, have you received a transfusion of blood or blood     products, or been given immune (gamma) globulin or antiviral drug?   No   For women: Are you pregnant or is there a chance you could become        pregnant during the next month?   No   Have you received any vaccinations in the past 4 weeks?   No     Immunization questionnaire was positive for at least one answer.  Notified provider ok to give.        Per orders of Stacey Sandoval, injection of Tdap given by Myra Bruno. Patient instructed to remain in clinic for 15 minutes afterwards, and to report any adverse reaction to me immediately.       Screening performed by Myra Bruno on 10/30/2017 at 2:41 PM.

## 2017-10-30 NOTE — MR AVS SNAPSHOT
After Visit Summary   10/30/2017    Feliciano Arreguin    MRN: [id number]           Patient Information     Date Of Birth          1995        Visit Information        Provider Department      10/30/2017 9:30 AM NL FLOAT NURSE [facility]        Today's Diagnoses     History of chicken pox    -  1    Screening examination for pulmonary tuberculosis        Need for vaccination           Follow-ups after your visit        Your next 10 appointments already scheduled     Jan 02, 2018 11:30 AM CST   Return Visit with Eda De Dios MD   [facility] ([facility])    [address] [place] [address] [phone]              Who to contact     If you have questions or need follow up information about today's clinic visit or your schedule please contact [facility] directly at [phone].  Normal or non-critical lab and imaging results will be communicated to you by MyChart, letter or phone within 4 business days after the clinic has received the results. If you do not hear from us within 7 days, please contact the clinic through [employer] or phone. If you have a critical or abnormal lab result, we will notify you by phone as soon as possible.  Submit refill requests through [employer] or call your pharmacy and they will forward the refill request to us. Please allow 3 business days for your refill to be completed.          Additional Information About Your Visit        MyChart Information     [employer] gives you secure access to your electronic health record. If you see a primary care provider, you can also send messages to your care team and make appointments. If you have questions, please call your primary care clinic.  If you do not have a primary care provider, please call [phone] and they will assist you.        Care EveryWhere ID     This is your Care EveryWhere ID. This could be used by other organizations to  access your [place] medical records  [id number]         Blood Pressure from Last 3 Encounters:   09/21/17 128/68   04/04/17 122/80   03/13/17 122/86    Weight from Last 3 Encounters:   09/21/17 139 lb (63 kg)   04/04/17 138 lb (62.6 kg)   03/13/17 (P) 140 lb (63.5 kg)              We Performed the Following     1st  Administration  [99795]     M Tuberculosis by Quantiferon     TDAP VACCINE (ADACEL) [37929.002]     Varicella Zoster Virus Antibody IgG        Primary Care Provider Office Phone # Fax #    Stacey Sandoval PA-C [phone] [address] [place] [address]        Equal Access to Services     [facility] SHIRIN : Hadii aad ku hadasho Soomaali, waaxda luqadaha, qaybta kaalmada adeegyada, waxay ursulain jacqueline pierre . So [facility] [phone].    ATENCIÓN: Si habla español, tiene a theodore disposición servicios gratuitos de asistencia lingüística. LlFort [facility] [phone].    We comply with applicable federal civil rights laws and [place] laws. We do not discriminate on the basis of race, color, national origin, age, disability, sex, sexual orientation, or gender identity.            Thank you!     Thank you for choosing [facility]  for your care. Our goal is always to provide you with excellent care. Hearing back from our patients is one way we can continue to improve our services. Please take a few minutes to complete the written survey that you may receive in the mail after your visit with us. Thank you!             Your Updated Medication List - Protect others around you: Learn how to safely use, store and throw away your medicines at www.disposemymeds.org.          This list is accurate as of: 10/30/17  3:00 PM.  Always use your most recent med list.                   Brand Name Dispense Instructions for use Diagnosis    ACCU-CHEK ENE test strip   Generic drug:  blood glucose monitoring     400 Box    Use to test blood sugars up to 4 times daily.    Type 1  "diabetes mellitus, uncontrolled (H)       blood glucose monitoring meter device kit    no brand specified    1 kit    1 kit 3 times daily.    Type 1 diabetes, HbA1c goal < 7% (H)       Blood Pressure Kit     1 kit    1 kit daily    Secondary hypertension, unspecified       insulin glargine 100 UNIT/ML injection    LANTUS SOLOSTAR    3 Month    42 units daily    Type 1 diabetes, HbA1c goal < 7% (H)       * insulin lispro 100 UNIT/ML injection    HumaLOG KWIKpen    3 Month    Inject 1 U per 5 grams CHO. Total daily dose ~ 40 U.    Type 1 diabetes, HbA1c goal < 7% (H)       * insulin lispro 100 UNIT/ML injection    humaLOG    8 vial    Uses up to 75 units per day in insulin pump for basal, bolus, and priming of insulin pump tubing.    Type 1 diabetes mellitus not at goal (H)       insulin pen needle 31G X 8 MM    B-D U/F    200 each    Use 6 daily or as directed.    Type 1 diabetes, HbA1c goal < 7% (H)       insulin syringe-needle U-100 31G X 5/16\" 0.5 ML    GNP ULTRA COM INSULIN SYRINGE    720 each    Any brand, to be used with subcutaneous insulin injection 4-8 times/day.    Type 1 diabetes, HbA1c goal < 7% (H)       medroxyPROGESTERone 150 MG/ML injection    DEPO-PROVERA    1 mL    Inject 1 mL (150 mg) into the muscle every 3 months    Encounter for surveillance of injectable contraceptive       oseltamivir 75 MG capsule    TAMIFLU    10 capsule    Take 1 capsule (75 mg) by mouth 2 times daily    Influenza A       * Notice:  This list has 2 medication(s) that are the same as other medications prescribed for you. Read the directions carefully, and ask your doctor or other care provider to review them with you.      "

## 2017-10-30 NOTE — TELEPHONE ENCOUNTER
Huddled with provider about form for school, pt will need appt to fill out. Left message for pt to return call, when call is returned help schedule OV for school physical.    Myra Bruno CMA ([facility])

## 2017-10-30 NOTE — TELEPHONE ENCOUNTER
Reason for call:  Form  Reason for Call:  Form, our goal is to have forms completed with 72 hours, however, some forms may require a visit or additional information.    Type of letter, form or note:  school       Who is the form from?: [facility] (if other please explain)    Where did the form come from: Patient or family brought in       What clinic location was the form placed at?: [facility] - [phone]    Where the form was placed: 's Box    What number is listed as a contact on the form?: Feliciano Arreguin         Additional comments: pt is wondering if provider can sign/complete form    Call taken on 10/30/2017 at 2:54 PM by Myra Bruno      Titers were done today for TB gold and chicken pox as pt is up to date on MMR, Hep B and flu shot. Tdap was updated today. Titers were ordered under PCP. Pt would like to  form, updated list of vaccines and titer results when they are completed.    Myra Bruno, THONG (AAMA)

## 2017-10-31 LAB — VZV IGG SER QL IA: >8 AI (ref 0–0.8)

## 2017-11-01 LAB
M TB TUBERC IFN-G BLD QL: NEGATIVE
M TB TUBERC IFN-G/MITOGEN IGNF BLD: 0.01 IU/ML

## 2017-11-01 NOTE — PROGRESS NOTES
SUBJECTIVE:   CC: Feliciano Arreguin is an 22 year old woman who presents for preventive health visit.     Physical   Annual:     Getting at least 3 servings of Calcium per day::  Yes    Bi-annual eye exam::  Yes    Dental care twice a year::  NO    Sleep apnea or symptoms of sleep apnea::  None    Diet::  Regular (no restrictions)    Frequency of exercise::  1 day/week    Duration of exercise::  15-30 minutes    Taking medications regularly::  Yes    Medication side effects::  None    Additional concerns today::  No    Answers for HPI/ROS submitted by the patient on 11/7/2017   PHQ-2 Score: 0      Today's PHQ-2 Score:   PHQ-2 ( 1999 Pf[employer]) 11/7/2017   Q1: Little interest or pleasure in doing things 0   Q2: Feeling down, depressed or hopeless 0   PHQ-2 Score 0   Q1: Little interest or pleasure in doing things Not at all   Q2: Feeling down, depressed or hopeless Not at all   PHQ-2 Score 0       Abuse: Current or Past(Physical, Sexual or Emotional)- No  Do you feel safe in your environment - Yes    Social History   Substance Use Topics     Smoking status: Never Smoker     Smokeless tobacco: Never Used      Comment: no smokers in household     Alcohol use No     The patient does not drink >3 drinks per day nor >7 drinks per week.    Reviewed orders with patient.  Reviewed health maintenance and updated orders accordingly - Yes  Labs reviewed in EPIC    Mammogram not appropriate for this patient based on age.    Pertinent mammograms are reviewed under the imaging tab.  History of abnormal Pap smear: NO - age 21-29 PAP every 3 years recommended    Reviewed and updated as needed this visit by clinical staff  Tobacco  Allergies  Med Hx  Surg Hx  Fam Hx  Soc Hx        Reviewed and updated as needed this visit by Provider        Past Medical History:   Diagnosis Date     Diabetes mellitus (H) 1998    Type 1     Diabetic eye exam (H) 08/09/2013    [facility]      Past Surgical History:   Procedure Laterality  "Date     EYE SURGERY      lazy eye correction (strabismus?)       Review of Systems  C: NEGATIVE for fever, chills, change in weight  I: NEGATIVE for worrisome rashes, moles or lesions  E: NEGATIVE for vision changes or irritation  ENT: NEGATIVE for ear, mouth and throat problems  R: NEGATIVE for significant cough or SOB  B: NEGATIVE for masses, tenderness or discharge  CV: NEGATIVE for chest pain, palpitations or peripheral edema  GI: NEGATIVE for nausea, abdominal pain, heartburn, or change in bowel habits  : NEGATIVE for unusual urinary or vaginal symptoms. Periods are regular.  M: NEGATIVE for significant arthralgias or myalgia  N: NEGATIVE for weakness, dizziness or paresthesias  P: NEGATIVE for changes in mood or affect     OBJECTIVE:   /84  Pulse 116  Temp 98  F (36.7  C) (Temporal)  Resp 12  Ht 5' 1.61\" (1.565 m)  Wt 142 lb 8 oz (64.6 kg)  BMI 26.39 kg/m2  Physical Exam  GENERAL: healthy, alert and no distress  EYES: Eyes grossly normal to inspection, PERRL and conjunctivae and sclerae normal  HENT: ear canals and TM's normal, nose and mouth without ulcers or lesions  NECK: no adenopathy, no asymmetry, masses, or scars and thyroid normal to palpation  RESP: lungs clear to auscultation - no rales, rhonchi or wheezes  CV: regular rate and rhythm, normal S1 S2, no S3 or S4, no murmur, click or rub, no peripheral edema and peripheral pulses strong  ABDOMEN: soft, nontender, no hepatosplenomegaly, no masses and bowel sounds normal  MS: no gross musculoskeletal defects noted, no edema  SKIN: no suspicious lesions or rashes  NEURO: Normal strength and tone, mentation intact and speech normal  PSYCH: mentation appears normal, affect normal/bright    ASSESSMENT/PLAN:   1. Encounter for routine adult health examination without abnormal findings  Paperwork for college filled out for patient.     2. Type 1 diabetes mellitus not at goal (H)  Follows with endocrinology.       COUNSELING:  Reviewed preventive " "health counseling, as reflected in patient instructions       Regular exercise       Healthy diet/nutrition    BP Screening:   Last 3 BP Readings:    BP Readings from Last 3 Encounters:   11/07/17 124/84   09/21/17 128/68   04/04/17 122/80        reports that she has never smoked. She has never used smokeless tobacco.    Estimated body mass index is 26.39 kg/(m^2) as calculated from the following:    Height as of this encounter: 5' 1.61\" (1.565 m).    Weight as of this encounter: 142 lb 8 oz (64.6 kg).   Weight management plan: Discussed healthy diet and exercise guidelines and patient will follow up in 12 months in clinic to re-evaluate.    Counseling Resources:  ATP IV Guidelines  Pooled Cohorts Equation Calculator  Breast Cancer Risk Calculator  FRAX Risk Assessment  ICSI Preventive Guidelines  Dietary Guidelines for Americans, 2010  USDA's MyPlate  ASA Prophylaxis  Lung CA Screening    CLEMENCIA [facility]  "

## 2017-11-06 ENCOUNTER — MYC MEDICAL ADVICE (OUTPATIENT)
Dept: FAMILY MEDICINE | Facility: OTHER | Age: 22
End: 2017-11-06

## 2017-11-07 ENCOUNTER — OFFICE VISIT (OUTPATIENT)
Dept: FAMILY MEDICINE | Facility: OTHER | Age: 22
End: 2017-11-07
Payer: COMMERCIAL

## 2017-11-07 VITALS
DIASTOLIC BLOOD PRESSURE: 84 MMHG | WEIGHT: 142.5 LBS | HEIGHT: 62 IN | HEART RATE: 116 BPM | RESPIRATION RATE: 12 BRPM | BODY MASS INDEX: 26.22 KG/M2 | TEMPERATURE: 98 F | SYSTOLIC BLOOD PRESSURE: 124 MMHG

## 2017-11-07 DIAGNOSIS — Z00.00 ENCOUNTER FOR ROUTINE ADULT HEALTH EXAMINATION WITHOUT ABNORMAL FINDINGS: Primary | ICD-10-CM

## 2017-11-07 DIAGNOSIS — E10.9 TYPE 1 DIABETES MELLITUS NOT AT GOAL (H): ICD-10-CM

## 2017-11-07 PROCEDURE — 99395 PREV VISIT EST AGE 18-39: CPT | Performed by: STUDENT IN AN ORGANIZED HEALTH CARE EDUCATION/TRAINING PROGRAM

## 2017-11-07 ASSESSMENT — PAIN SCALES - GENERAL: PAINLEVEL: NO PAIN (0)

## 2017-11-07 NOTE — NURSING NOTE
"Chief Complaint   Patient presents with     Physical     Panel Management     pneumovax 23, chlamydia, lipids, cmp, a1c, microalbumin, foot exam, tsh       Initial /84 (BP Location: Left arm, Patient Position: Sitting, Cuff Size: Adult Regular)  Pulse 116  Temp 98  F (36.7  C) (Temporal)  Resp 12  Ht 5' 1.61\" (1.565 m)  Wt 142 lb 8 oz (64.6 kg)  BMI 26.39 kg/m2 Estimated body mass index is 26.39 kg/(m^2) as calculated from the following:    Height as of this encounter: 5' 1.61\" (1.565 m).    Weight as of this encounter: 142 lb 8 oz (64.6 kg).  Medication Reconciliation: complete   Gisselle West CMA      "

## 2017-11-07 NOTE — MR AVS SNAPSHOT
After Visit Summary   11/7/2017    Feliciano Arreguin    MRN: [id number]           Patient Information     Date Of Birth          1995        Visit Information        Provider Department      11/7/2017 4:00 PM Cecilia Banks APRN [facility]        Today's Diagnoses     Encounter for routine adult health examination without abnormal findings    -  1    Type 1 diabetes mellitus not at goal (H)          Care Instructions      Preventive Health Recommendations  Female Ages 18 to 25     Yearly exam:     See your health care provider every year in order to  o Review health changes.   o Discuss preventive care.    o Review your medicines if your doctor has prescribed any.      You should be tested each year for STDs (sexually transmitted diseases).       After age 20, talk to your provider about how often you should have cholesterol testing.      Starting at age 21, get a Pap test every three years. If you have an abnormal result, your doctor may have you test more often.      If you are at risk for diabetes, you should have a diabetes test (fasting glucose).     Shots:     Get a flu shot each year.     Get a tetanus shot every 10 years.     Consider getting the shot (vaccine) that prevents cervical cancer (Gardasil).    Nutrition:     Eat at least 5 servings of fruits and vegetables each day.    Eat whole-grain bread, whole-wheat pasta and brown rice instead of white grains and rice.    Talk to your provider about Calcium and Vitamin D.     Lifestyle    Exercise at least 150 minutes a week each week (30 minutes a day, 5 days a week). This will help you control your weight and prevent disease.    Limit alcohol to one drink per day.    No smoking.     Wear sunscreen to prevent skin cancer.    See your dentist every six months for an exam and cleaning.          Follow-ups after your visit        Your next 10 appointments already scheduled     Jan 02, 2018 11:30 AM CST   Return  "Visit with Eda De Dios MD   [facility] ([facility])    [address] [address] [place] [address] [phone]              Who to contact     If you have questions or need follow up information about today's clinic visit or your schedule please contact [facility] directly at [phone].  Normal or non-critical lab and imaging results will be communicated to you by [employer], letter or phone within 4 business days after the clinic has received the results. If you do not hear from us within 7 days, please contact the clinic through [employer] or phone. If you have a critical or abnormal lab result, we will notify you by phone as soon as possible.  Submit refill requests through [employer] or call your pharmacy and they will forward the refill request to us. Please allow 3 business days for your refill to be completed.          Additional Information About Your Visit        [employer] Information     [employer] gives you secure access to your electronic health record. If you see a primary care provider, you can also send messages to your care team and make appointments. If you have questions, please call your primary care clinic.  If you do not have a primary care provider, please call [phone] and they will assist you.        Care EveryWhere ID     This is your Care EveryWhere ID. This could be used by other organizations to access your [place] medical records  [id number]        Your Vitals Were     Pulse Temperature Respirations Height BMI (Body Mass Index)       116 98  F (36.7  C) (Temporal) 12 5' 1.61\" (1.565 m) 26.39 kg/m2        Blood Pressure from Last 3 Encounters:   11/07/17 124/84   09/21/17 128/68   04/04/17 122/80    Weight from Last 3 Encounters:   11/07/17 142 lb 8 oz (64.6 kg)   09/21/17 139 lb (63 kg)   04/04/17 138 lb (62.6 kg)              Today, you had the following     No orders found for display       Primary Care Provider Office " Phone # Fax #    Stacey Sandoval PA-C [phone] [address] [place] [address]        Equal Access to Services     MIL CARPIO : Hadii arun orellana farao Soomaali, waaxda luqadaha, qaybta kaalmada adelacho, juanito cortezmaribeth yamilet. So [facility] [phone].    ATENCIÓN: Si habla español, tiene a theodore disposición servicios gratuitos de asistencia lingüística. Llame al [phone].    We comply with applicable federal civil rights laws and [place] laws. We do not discriminate on the basis of race, color, national origin, age, disability, sex, sexual orientation, or gender identity.            Thank you!     Thank you for choosing [facility]  for your care. Our goal is always to provide you with excellent care. Hearing back from our patients is one way we can continue to improve our services. Please take a few minutes to complete the written survey that you may receive in the mail after your visit with us. Thank you!             Your Updated Medication List - Protect others around you: Learn how to safely use, store and throw away your medicines at www.disposemymeds.org.          This list is accurate as of: 11/7/17 11:05 PM.  Always use your most recent med list.                   Brand Name Dispense Instructions for use Diagnosis    ACCU-CHEK ENE test strip   Generic drug:  blood glucose monitoring     400 Box    Use to test blood sugars up to 4 times daily.    Type 1 diabetes mellitus, uncontrolled (H)       blood glucose monitoring meter device kit    no brand specified    1 kit    1 kit 3 times daily.    Type 1 diabetes, HbA1c goal < 7% (H)       Blood Pressure Kit     1 kit    1 kit daily    Secondary hypertension, unspecified       insulin glargine 100 UNIT/ML injection    LANTUS SOLOSTAR    3 Month    42 units daily    Type 1 diabetes, HbA1c goal < 7% (H)       * insulin lispro 100 UNIT/ML injection    HumaLOG KWIKpen    3 Month    Inject 1 U per 5  "grams CHO. Total daily dose ~ 40 U.    Type 1 diabetes, HbA1c goal < 7% (H)       * insulin lispro 100 UNIT/ML injection    humaLOG    8 vial    Uses up to 75 units per day in insulin pump for basal, bolus, and priming of insulin pump tubing.    Type 1 diabetes mellitus not at goal (H)       insulin pen needle 31G X 8 MM    B-D U/F    200 each    Use 6 daily or as directed.    Type 1 diabetes, HbA1c goal < 7% (H)       insulin syringe-needle U-100 31G X 5/16\" 0.5 ML    GNP ULTRA COM INSULIN SYRINGE    720 each    Any brand, to be used with subcutaneous insulin injection 4-8 times/day.    Type 1 diabetes, HbA1c goal < 7% (H)       medroxyPROGESTERone 150 MG/ML injection    DEPO-PROVERA    1 mL    Inject 1 mL (150 mg) into the muscle every 3 months    Encounter for surveillance of injectable contraceptive       oseltamivir 75 MG capsule    TAMIFLU    10 capsule    Take 1 capsule (75 mg) by mouth 2 times daily    Influenza A       * Notice:  This list has 2 medication(s) that are the same as other medications prescribed for you. Read the directions carefully, and ask your doctor or other care provider to review them with you.      "

## 2017-11-14 ENCOUNTER — TELEPHONE (OUTPATIENT)
Dept: ENDOCRINOLOGY | Facility: CLINIC | Age: 22
End: 2017-11-14

## 2017-11-14 NOTE — TELEPHONE ENCOUNTER
Received incoming fax from [employer] for Dexcom sensor refills, requesting chart notes from the patient's last 2-3 visits. Response sent back to [facility] adving them we cannot authorize refills as we have not seen this patient since July, 2016.     Anna Black, RN, BSN, CDE   [facility]

## 2017-12-04 ENCOUNTER — MYC MEDICAL ADVICE (OUTPATIENT)
Dept: FAMILY MEDICINE | Facility: OTHER | Age: 22
End: 2017-12-04

## 2017-12-05 NOTE — PROGRESS NOTES
"  SUBJECTIVE:                                                    Feliciano Arreguin is a 22 year old female who presents to clinic today for the following health issues:      HPI    Patient is here for consult on mole on left back. Mole is on bra-line, \"it's puffy so it catches on everything\".  Has had it since she was little. Denies pain, change in the mole recently. Just wants if removed because it catches on clothing.     Problem list and histories reviewed & adjusted, as indicated.  Additional history: as documented        Recent Labs   Lab Test 07/26/16 04/05/16 04/04/16   0730  01/05/16   1016  09/15/15   0957   01/06/15   0959   05/14/13   0836  01/03/13   1030   A1C  8.1  8.6*   --   8.5*  9.7*   < >  10.9*   < >  9.6*  10.4*   LDL   --    --    --    --    --    --   89   --   86  118   HDL   --    --    --    --    --    --    --    --    --   40*   TRIG   --    --    --    --    --    --    --    --    --   153*   CR   --    --   90   --   0.62   --   0.56   < >   --   0.64   GFRESTIMATED   --    --    --    --   >90  Non  GFR Calc     --   >90  Non  GFR Calc     < >   --   >90   GFRESTBLACK   --    --    --    --   >90   GFR Calc     --   >90   GFR Calc     < >   --   >90   POTASSIUM   --    --    --   4.3  4.1   --   4.0   --    --   4.1   TSH   --    --    --    --    --    --   1.41   --    --   2.73    < > = values in this interval not displayed.      BP Readings from Last 3 Encounters:   12/08/17 156/72   11/07/17 124/84   09/21/17 128/68    Wt Readings from Last 3 Encounters:   12/08/17 142 lb 1.6 oz (64.5 kg)   11/07/17 142 lb 8 oz (64.6 kg)   09/21/17 139 lb (63 kg)                  Labs reviewed in EPIC        ROS:  Constitutional, HEENT, cardiovascular, pulmonary, gi and gu systems are negative, except as otherwise noted.      OBJECTIVE:   /72 (BP Location: Left arm, Patient Position: Sitting, Cuff Size: Adult Regular)  Pulse " "108  Temp 99.5  F (37.5  C) (Temporal)  Resp 16  Ht 5' 1.61\" (1.565 m)  Wt 142 lb 1.6 oz (64.5 kg)  BMI 26.32 kg/m2  Body mass index is 26.32 kg/(m^2).  GENERAL: healthy, alert and no distress  MS: no gross musculoskeletal defects noted, no edema  SKIN: papular brown lesion 0.8 cm on left upper back  NEURO: Normal strength and tone, mentation intact and speech normal  PSYCH: mentation appears normal, affect normal/bright    Diagnostic Test Results:  Hep B and MMR titers    ASSESSMENT/PLAN:     1. Benign neoplasm of skin of trunk, except scrotum  - Discussed risks and benefits of procedure including bleeding, infection, and scarring   - Recommend procedure as follows:          1) elliptical incision with removal of lesion with sutures and removal in 10-14 days                  Supplies: 1% lidocaine with epi, 2 alcohol wipes, chloraprep, biopsy blade - 15, lac pack, Sutures: 3-0 Ethilon, sterile 4x4 pack, 2 - sterile 2x2, 2 - small tegaderm    Progress Note:  Subjective: Feliciano Arreguin a 22 year old year old fe/male who presents today for lesion removal. The lesion(s) is/are located on the back, number 1 and measures 0.8cm. The patient reports the lesion is asymptomatic and denies other significant symptoms on ROS.    Pause for the cause has been completed prior to the prceedure.   1. Patient was identified by both name and date of birth   2. The correct site was identified   3. Site was marked by provider    4. Written informed consent correct and signed or verbal authorization  to proceed was obtained   5. Verifed necessary supplies, equipment, and diagnostics are available    6. Time out was performed immediately prior to procedure    Objective: The lesion(s) is/are of the above mentioned size and location and is/are typical. The area was prepped using alcohol swabs and appropriately anesthetized using 5 cc of 1% lidocaine with epi. Using the usual technique, full thickness elliptical excision in total 1 cm x " 1 cm was performed. Hemostasis was obtained using gauze and pressure. An appropriate dressing was applied. The procedure was well tolerated and without complications.  - EXC MALIG SKIN LESION TRUNK/ARM/LEG 0.6-1.0 CM      2. Type 1 diabetes mellitus without complication (H)  Well-controlled at this time. Increased risk for infection at excision site discussed with patient. Sterile procedure used to reduce risk.    3. Encounter for surveillance of injectable contraceptive  - medroxyPROGESTERone (DEPO-PROVERA) 150 MG/ML injection; Inject 1 mL (150 mg) into the muscle every 3 months  Dispense: 3 mL; Refill: 3  - C Medroxyprogesterone inj/1mg  - INJECTION INTRAMUSCULAR OR SUB-Q  - Mumps Antibody IgG  - Rubella Antibody IgG Quantitative    4. Encounter for special examination  - MUMPS AB IGM, IFA  - Rubeola Antibody IgG  - Hepatitis B Surface Antibody    [facility]

## 2017-12-08 ENCOUNTER — OFFICE VISIT (OUTPATIENT)
Dept: FAMILY MEDICINE | Facility: OTHER | Age: 22
End: 2017-12-08
Payer: COMMERCIAL

## 2017-12-08 VITALS
HEIGHT: 62 IN | TEMPERATURE: 99.5 F | HEART RATE: 108 BPM | RESPIRATION RATE: 16 BRPM | DIASTOLIC BLOOD PRESSURE: 72 MMHG | SYSTOLIC BLOOD PRESSURE: 156 MMHG | WEIGHT: 142.1 LBS | BODY MASS INDEX: 26.15 KG/M2

## 2017-12-08 DIAGNOSIS — Z30.42 ENCOUNTER FOR SURVEILLANCE OF INJECTABLE CONTRACEPTIVE: ICD-10-CM

## 2017-12-08 DIAGNOSIS — D23.5 BENIGN NEOPLASM OF SKIN OF TRUNK, EXCEPT SCROTUM: Primary | ICD-10-CM

## 2017-12-08 DIAGNOSIS — E10.9 TYPE 1 DIABETES MELLITUS WITHOUT COMPLICATION (H): ICD-10-CM

## 2017-12-08 DIAGNOSIS — Z01.89 ENCOUNTER FOR SPECIAL EXAMINATION: ICD-10-CM

## 2017-12-08 PROCEDURE — 86735 MUMPS ANTIBODY: CPT | Performed by: STUDENT IN AN ORGANIZED HEALTH CARE EDUCATION/TRAINING PROGRAM

## 2017-12-08 PROCEDURE — 99207 ZZC DROP WITH A PROCEDURE: CPT | Performed by: STUDENT IN AN ORGANIZED HEALTH CARE EDUCATION/TRAINING PROGRAM

## 2017-12-08 PROCEDURE — 36415 COLL VENOUS BLD VENIPUNCTURE: CPT | Performed by: STUDENT IN AN ORGANIZED HEALTH CARE EDUCATION/TRAINING PROGRAM

## 2017-12-08 PROCEDURE — 86706 HEP B SURFACE ANTIBODY: CPT | Performed by: STUDENT IN AN ORGANIZED HEALTH CARE EDUCATION/TRAINING PROGRAM

## 2017-12-08 PROCEDURE — 96372 THER/PROPH/DIAG INJ SC/IM: CPT | Performed by: STUDENT IN AN ORGANIZED HEALTH CARE EDUCATION/TRAINING PROGRAM

## 2017-12-08 PROCEDURE — 86762 RUBELLA ANTIBODY: CPT | Performed by: STUDENT IN AN ORGANIZED HEALTH CARE EDUCATION/TRAINING PROGRAM

## 2017-12-08 PROCEDURE — 86765 RUBEOLA ANTIBODY: CPT | Performed by: STUDENT IN AN ORGANIZED HEALTH CARE EDUCATION/TRAINING PROGRAM

## 2017-12-08 PROCEDURE — 11601 EXC TR-EXT MAL+MARG 0.6-1 CM: CPT | Performed by: STUDENT IN AN ORGANIZED HEALTH CARE EDUCATION/TRAINING PROGRAM

## 2017-12-08 RX ORDER — MEDROXYPROGESTERONE ACETATE 150 MG/ML
150 INJECTION, SUSPENSION INTRAMUSCULAR
Qty: 3 ML | Refills: 3 | OUTPATIENT
Start: 2017-12-08 | End: 2019-02-14

## 2017-12-08 NOTE — NURSING NOTE
Prior to injection verified patient identity using patient's name and date of birth.  BP: 156/72    LAST PAP/EXAM:   Lab Results   Component Value Date    PAP NIL 10/14/2016     URINE HCG:not indicated    The following medication was given:     MEDICATION: Depo Provera 150mg  ROUTE: IM  SITE: Deltoid - Right  : Teva  LOT #: [id number]  EXP:4/2019  NEXT INJECTION DUE: 2/23/18 - 3/9/18   NDC: 0280-2866-76  Provider: Cecilia Bruno CMA (AAMA)

## 2017-12-08 NOTE — NURSING NOTE
"Chief Complaint   Patient presents with     Consult     mole removal on back     Panel Management     pneumovax 23, chlamydia, lipids, cmp, a1c, microalbumin, foot exam, tsh       Initial /72 (BP Location: Left arm, Patient Position: Sitting, Cuff Size: Adult Regular)  Pulse 108  Temp 99.5  F (37.5  C) (Temporal)  Resp 16  Ht 5' 1.61\" (1.565 m)  Wt 142 lb 1.6 oz (64.5 kg)  BMI 26.32 kg/m2 Estimated body mass index is 26.32 kg/(m^2) as calculated from the following:    Height as of this encounter: 5' 1.61\" (1.565 m).    Weight as of this encounter: 142 lb 1.6 oz (64.5 kg).  Medication Reconciliation: complete   Gisselle West CMA      "

## 2017-12-08 NOTE — MR AVS SNAPSHOT
After Visit Summary   12/8/2017    Feliciano Arreguin    MRN: [id number]           Patient Information     Date Of Birth          1995        Visit Information        Provider Department      12/8/2017 11:20 AM Cecilia Banks APRN CNP; NL PROC ROOM, [facility]        Today's Diagnoses     Benign neoplasm of skin of trunk, except scrotum    -  1    Type 1 diabetes mellitus without complication (H)        Encounter for surveillance of injectable contraceptive        Encounter for special examination           Follow-ups after your visit        Your next 10 appointments already scheduled     Jan 02, 2018 11:30 AM CST   Return Visit with Eda De Dios MD   [facility] ([facility]    [id number] [place] [address] [phone]              Who to contact     If you have questions or need follow up information about today's clinic visit or your schedule please contact [facility] directly at [phone].  Normal or non-critical lab and imaging results will be communicated to you by Basic6hart, letter or phone within 4 business days after the clinic has received the results. If you do not hear from us within 7 days, please contact the clinic through Basic6hart or phone. If you have a critical or abnormal lab result, we will notify you by phone as soon as possible.  Submit refill requests through [employer] or call your pharmacy and they will forward the refill request to us. Please allow 3 business days for your refill to be completed.          Additional Information About Your Visit        Basic6hart Information     [employer] gives you secure access to your electronic health record. If you see a primary care provider, you can also send messages to your care team and make appointments. If you have questions, please call your primary care clinic.  If you do not have a primary care provider, please call  "[phone] and they will assist you.        Care EveryWhere ID     This is your Care EveryWhere ID. This could be used by other organizations to access your [place] medical records  [id number]        Your Vitals Were     Pulse Temperature Respirations Height BMI (Body Mass Index)       108 99.5  F (37.5  C) (Temporal) 16 5' 1.61\" (1.565 m) 26.32 kg/m2        Blood Pressure from Last 3 Encounters:   12/08/17 156/72   11/07/17 124/84   09/21/17 128/68    Weight from Last 3 Encounters:   12/08/17 142 lb 1.6 oz (64.5 kg)   11/07/17 142 lb 8 oz (64.6 kg)   09/21/17 139 lb (63 kg)              We Performed the Following     C Medroxyprogesterone inj/1mg     EXC MALIG SKIN LESION TRUNK/ARM/LEG 0.6-1.0 CM     Hepatitis B Surface Antibody     INJECTION INTRAMUSCULAR OR SUB-Q     MUMPS AB IGM, IFA     Mumps Antibody IgG     Rubella Antibody IgG Quantitative     Rubeola Antibody IgG          Today's Medication Changes          These changes are accurate as of: 12/8/17  1:15 PM.  If you have any questions, ask your nurse or doctor.               These medicines have changed or have updated prescriptions.        Dose/Directions    * medroxyPROGESTERone 150 MG/ML injection   Commonly known as:  DEPO-PROVERA   This may have changed:  Another medication with the same name was added. Make sure you understand how and when to take each.   Used for:  Encounter for surveillance of injectable contraceptive   Changed by:  Stacey Sandoval PA-C        Dose:  150 mg   Inject 1 mL (150 mg) into the muscle every 3 months   Quantity:  1 mL   Refills:  0       * medroxyPROGESTERone 150 MG/ML injection   Commonly known as:  DEPO-PROVERA   This may have changed:  You were already taking a medication with the same name, and this prescription was added. Make sure you understand how and when to take each.   Used for:  Encounter for surveillance of injectable contraceptive   Changed by:  Cecilia Banks APRN CNP        Dose:  150 mg "   Inject 1 mL (150 mg) into the muscle every 3 months   Quantity:  3 mL   Refills:  3       * Notice:  This list has 2 medication(s) that are the same as other medications prescribed for you. Read the directions carefully, and ask your doctor or other care provider to review them with you.         Where to get your medicines      Some of these will need a paper prescription and others can be bought over the counter.  Ask your nurse if you have questions.     You don't need a prescription for these medications     medroxyPROGESTERone 150 MG/ML injection                Primary Care Provider Office Phone # Fax #    Stacey Sandoval PA-C [phone] [address] [place] [address]        Equal Access to Services     LAM CARPIO : Yanely Little, pantera mancia, chao cornejo, juanito woodard. So [facility] [phone].    ATENCIÓN: Si habla español, tiene a theodore disposición servicios gratuitos de asistencia lingüística. Llame al [phone].    We comply with applicable federal civil rights laws and [place] laws. We do not discriminate on the basis of race, color, national origin, age, disability, sex, sexual orientation, or gender identity.            Thank you!     Thank you for choosing [facility]  for your care. Our goal is always to provide you with excellent care. Hearing back from our patients is one way we can continue to improve our services. Please take a few minutes to complete the written survey that you may receive in the mail after your visit with us. Thank you!             Your Updated Medication List - Protect others around you: Learn how to safely use, store and throw away your medicines at www.disposemymeds.org.          This list is accurate as of: 12/8/17  1:15 PM.  Always use your most recent med list.                   Brand Name Dispense Instructions for use Diagnosis    ACCU-CHEK ENE test strip   Generic  "drug:  blood glucose monitoring     400 Box    Use to test blood sugars up to 4 times daily.    Type 1 diabetes mellitus, uncontrolled (H)       blood glucose monitoring meter device kit    no brand specified    1 kit    1 kit 3 times daily.    Type 1 diabetes, HbA1c goal < 7% (H)       Blood Pressure Kit     1 kit    1 kit daily    Secondary hypertension, unspecified       insulin glargine 100 UNIT/ML injection    LANTUS SOLOSTAR    3 Month    42 units daily    Type 1 diabetes, HbA1c goal < 7% (H)       * insulin lispro 100 UNIT/ML injection    HumaLOG KWIKpen    3 Month    Inject 1 U per 5 grams CHO. Total daily dose ~ 40 U.    Type 1 diabetes, HbA1c goal < 7% (H)       * insulin lispro 100 UNIT/ML injection    humaLOG    8 vial    Uses up to 75 units per day in insulin pump for basal, bolus, and priming of insulin pump tubing.    Type 1 diabetes mellitus not at goal (H)       insulin pen needle 31G X 8 MM    B-D U/F    200 each    Use 6 daily or as directed.    Type 1 diabetes, HbA1c goal < 7% (H)       insulin syringe-needle U-100 31G X 5/16\" 0.5 ML    GNP ULTRA COM INSULIN SYRINGE    720 each    Any brand, to be used with subcutaneous insulin injection 4-8 times/day.    Type 1 diabetes, HbA1c goal < 7% (H)       * medroxyPROGESTERone 150 MG/ML injection    DEPO-PROVERA    1 mL    Inject 1 mL (150 mg) into the muscle every 3 months    Encounter for surveillance of injectable contraceptive       * medroxyPROGESTERone 150 MG/ML injection    DEPO-PROVERA    3 mL    Inject 1 mL (150 mg) into the muscle every 3 months    Encounter for surveillance of injectable contraceptive       oseltamivir 75 MG capsule    TAMIFLU    10 capsule    Take 1 capsule (75 mg) by mouth 2 times daily    Influenza A       * Notice:  This list has 4 medication(s) that are the same as other medications prescribed for you. Read the directions carefully, and ask your doctor or other care provider to review them with you.      "

## 2017-12-09 LAB
MEV IGG SER QL IA: 2.6 AI (ref 0–0.8)
MUV IGG SER QL IA: 2.7 AI (ref 0–0.8)
RUBV IGG SERPL IA-ACNC: 52 IU/ML

## 2017-12-11 ENCOUNTER — TELEPHONE (OUTPATIENT)
Dept: FAMILY MEDICINE | Facility: OTHER | Age: 22
End: 2017-12-11

## 2017-12-11 LAB — HBV SURFACE AB SERPL IA-ACNC: 26 M[IU]/ML

## 2017-12-22 ENCOUNTER — TELEPHONE (OUTPATIENT)
Dept: ENDOCRINOLOGY | Facility: CLINIC | Age: 22
End: 2017-12-22

## 2017-12-22 ENCOUNTER — ALLIED HEALTH/NURSE VISIT (OUTPATIENT)
Dept: FAMILY MEDICINE | Facility: OTHER | Age: 22
End: 2017-12-22
Payer: COMMERCIAL

## 2017-12-22 DIAGNOSIS — Z48.02 VISIT FOR SUTURE REMOVAL: Primary | ICD-10-CM

## 2017-12-22 PROCEDURE — 99207 ZZC NO CHARGE NURSE ONLY: CPT

## 2017-12-22 NOTE — MR AVS SNAPSHOT
After Visit Summary   12/22/2017    Feliciano Arreguin    MRN: [id number]           Patient Information     Date Of Birth          1995        Visit Information        Provider Department      12/22/2017 10:00 AM NL RN [facility]        Today's Diagnoses     Visit for suture removal    -  1       Follow-ups after your visit        Your next 10 appointments already scheduled     Jan 02, 2018 11:30 AM CST   Return Visit with Eda De Dios MD   [facility]    [address] [place] [address] [phone]              Who to contact     If you have questions or need follow up information about today's clinic visit or your schedule please contact [facility] directly at [phone].  Normal or non-critical lab and imaging results will be communicated to you by MyChart, letter or phone within 4 business days after the clinic has received the results. If you do not hear from us within 7 days, please contact the clinic through MyChart or phone. If you have a critical or abnormal lab result, we will notify you by phone as soon as possible.  Submit refill requests through [employer] or call your pharmacy and they will forward the refill request to us. Please allow 3 business days for your refill to be completed.          Additional Information About Your Visit        MyChart Information     [employer] gives you secure access to your electronic health record. If you see a primary care provider, you can also send messages to your care team and make appointments. If you have questions, please call your primary care clinic.  If you do not have a primary care provider, please call [phone] and they will assist you.        Care EveryWhere ID     This is your Care EveryWhere ID. This could be used by other organizations to access your [place] medical records  [id number]         Blood Pressure from Last 3  Encounters:   12/08/17 156/72   11/07/17 124/84   09/21/17 128/68    Weight from Last 3 Encounters:   12/08/17 142 lb 1.6 oz (64.5 kg)   11/07/17 142 lb 8 oz (64.6 kg)   09/21/17 139 lb (63 kg)              Today, you had the following     No orders found for display         Where to get your medicines      These medications were sent to [facility] [address] [facility] [address] [facility]  [address] [facility] [address]     Phone:  [phone]     ACCU-CHEK ENE test strip          Primary Care Provider Office Phone # Fax #    Stacey Sandoval PA-C [phone] [address] [place] [address]        Equal Access to Services     [facility]: Hadii aad ku hadasho Soomaali, waaxda luqadaha, qaybta kaalmada violetteyatali, juanito pierre . So [facility] [phone].    ATENCIÓN: Si habla español, tiene a theodore disposición servicios gratuitos de asistencia lingüística. [facility] [phone].    We comply with applicable federal civil rights laws and [place] laws. We do not discriminate on the basis of race, color, national origin, age, disability, sex, sexual orientation, or gender identity.            Thank you!     Thank you for choosing [facility]  for your care. Our goal is always to provide you with excellent care. Hearing back from our patients is one way we can continue to improve our services. Please take a few minutes to complete the written survey that you may receive in the mail after your visit with us. Thank you!             Your Updated Medication List - Protect others around you: Learn how to safely use, store and throw away your medicines at www.disposemymeds.org.          This list is accurate as of: 12/22/17  3:44 PM.  Always use your most recent med list.                   Brand Name Dispense Instructions for use Diagnosis    ACCU-CHEK ENE test strip   Generic drug:  blood glucose monitoring     120 Box    Use to test blood  "sugars up to 4 times daily.    Type 1 diabetes mellitus, uncontrolled (H)       blood glucose monitoring meter device kit    no brand specified    1 kit    1 kit 3 times daily.    Type 1 diabetes, HbA1c goal < 7% (H)       Blood Pressure Kit     1 kit    1 kit daily    Secondary hypertension, unspecified       insulin glargine 100 UNIT/ML injection    LANTUS SOLOSTAR    3 Month    42 units daily    Type 1 diabetes, HbA1c goal < 7% (H)       * insulin lispro 100 UNIT/ML injection    HumaLOG KWIKpen    3 Month    Inject 1 U per 5 grams CHO. Total daily dose ~ 40 U.    Type 1 diabetes, HbA1c goal < 7% (H)       * insulin lispro 100 UNIT/ML injection    humaLOG    8 vial    Uses up to 75 units per day in insulin pump for basal, bolus, and priming of insulin pump tubing.    Type 1 diabetes mellitus not at goal (H)       insulin pen needle 31G X 8 MM    B-D U/F    200 each    Use 6 daily or as directed.    Type 1 diabetes, HbA1c goal < 7% (H)       insulin syringe-needle U-100 31G X 5/16\" 0.5 ML    GNP ULTRA COM INSULIN SYRINGE    720 each    Any brand, to be used with subcutaneous insulin injection 4-8 times/day.    Type 1 diabetes, HbA1c goal < 7% (H)       medroxyPROGESTERone 150 MG/ML injection    DEPO-PROVERA    3 mL    Inject 1 mL (150 mg) into the muscle every 3 months    Encounter for surveillance of injectable contraceptive       oseltamivir 75 MG capsule    TAMIFLU    10 capsule    Take 1 capsule (75 mg) by mouth 2 times daily    Influenza A       * Notice:  This list has 2 medication(s) that are the same as other medications prescribed for you. Read the directions carefully, and ask your doctor or other care provider to review them with you.      "

## 2017-12-22 NOTE — TELEPHONE ENCOUNTER
[facility] Call Center    Phone Message    Name of Caller: Feliciano    Phone Number: Home number on file [phone] (home)    Best time to return call: Any    May a detailed message be left on voicemail: yes    Relation to patient: Self    Reason for Call: Patient is calling to request a refill for ACCU-CHEK ENE test strip [15098] (Order [id number]) . She out and would like the Rx [facility] today [facility] [place]. Please advise.     Action Taken: Message routed to:  Adult Clinics: Endocrinology p [phone]

## 2017-12-26 NOTE — TELEPHONE ENCOUNTER
Notification received from [facility]. Patient requesting Contour Next Test Strips.    Refill for one month completed to pharmacy.    Brandy Rodriguez LPN  Adult Endocrinology  [facility]

## 2018-01-02 ENCOUNTER — OFFICE VISIT (OUTPATIENT)
Dept: ENDOCRINOLOGY | Facility: CLINIC | Age: 23
End: 2018-01-02
Payer: COMMERCIAL

## 2018-01-02 VITALS — DIASTOLIC BLOOD PRESSURE: 98 MMHG | SYSTOLIC BLOOD PRESSURE: 154 MMHG | HEART RATE: 130 BPM

## 2018-01-02 DIAGNOSIS — R03.0 ELEVATED BLOOD PRESSURE READING WITHOUT DIAGNOSIS OF HYPERTENSION: ICD-10-CM

## 2018-01-02 LAB — HBA1C MFR BLD: 7.8 % (ref 0–5.7)

## 2018-01-02 PROCEDURE — 36415 COLL VENOUS BLD VENIPUNCTURE: CPT | Performed by: INTERNAL MEDICINE

## 2018-01-02 PROCEDURE — 83036 HEMOGLOBIN GLYCOSYLATED A1C: CPT | Performed by: INTERNAL MEDICINE

## 2018-01-02 PROCEDURE — 95251 CONT GLUC MNTR ANALYSIS I&R: CPT | Performed by: INTERNAL MEDICINE

## 2018-01-02 PROCEDURE — 99214 OFFICE O/P EST MOD 30 MIN: CPT | Performed by: INTERNAL MEDICINE

## 2018-01-02 NOTE — PROGRESS NOTES
The patient is seen in f/up for evaluation of diabetes. She was last seen in the clinic 1 1/2 years ago.     Feliciano Arreguin is a 22 year old female diagnosed with type 1 diabetes in 2008, when she was found to be in DKA. Hemoglobin A1c used to be between 9.3 and 10.9 and it significantly improved in 2016, to 8.1-8.6.  It was 7.8% today.      Her diabetes is known to be complicated by intermittent microalbuminuria.  Since her last visit here, she was started on a DEXCOM sensor in the beginning of 2017.    In a regular day she has 2 meals (she skips the lunch and has a snack instead) and 2 snacks (around lunchtime and at bedtime).      Insulin pump settings:  Basal rate at 12 midnight 1.4 units per hour, at 1 PM 1.55 units per hour, at 6 PM 1.45 units per hour.  Insulin to carb ratio 1 U per 6 grams  Sensitivity 30   Target 90 - 120   Active insulin time 3 hrs     The pump reveals that she checks her blood glucose 5 times daily.  Average blood glucose is 215 with a standard deviation of 53.  Total daily insulin dose is 64 units, of which 53 % is basal insulin.  Her morning blood sugar is consistently elevated from 6 AM to 2 PM and it tends to decline around 6 PM.  There are no hypoglycemic episodes documented by the pump.  She inconsistently relies on the bolus wizard settings and she continues to bolus manually, quite frequently, mainly for the snacks.  The lowest documented blood sugar in the last 2 weeks was 74, at 6 PM.  If she does use the bolus wizard, quite frequently, she uses the square wave.  There are periods of times when she boluses repeatedly, manually, in order to bring her blood sugar down, based on the sensor readings.  I reviewed the sensor records on her iPhone.  On the sensor, the average blood sugar over the last 2 weeks was 200, with a standard deviation of 61.  The sensor shows that her blood sugar spontaneously starts to increase around 1-2 AM and is consistently elevated around 6  "AM.    Diabetes complications:  Retinopathy: last eye exam 9/2017; mild DR   Nephropathy: h/o proteinuria dating back to 2010  Neuropathy: no numbness or tingling sensation   Feels shaky, sweaty and dizzy when BG drops in the 90s. Lowest BG she remembers was 35. She denies prior episodes of loss of consciousness due to hypoglycemia.  She has glucagon at home and her parents know how to administer it.  She had one episode of diabetes ketoacidosis in 1999.  Lisinopril was recommended in the past but the patient refused treatment.     Past Medical History:   Diagnosis Date     Diabetes mellitus (H) 1998    Type 1     Diabetic eye exam (H) 08/09/2013    [facility]   Treated with Depo-Provera since, approximately, 2014    Past Surgical History:   Procedure Laterality Date     EYE SURGERY      lazy eye correction (strabismus?)     Current Medications   On depoprovera since 2014 (pt preferred not to have MP)   Prescription Medications as of 1/2/2018             blood glucose monitoring (RACHEL CONTOUR NEXT) test strip Use to test blood sugar 4 times daily or as directed.    ACCU-CHEK ENE test strip Use to test blood sugars up to 4 times daily.    medroxyPROGESTERone (DEPO-PROVERA) 150 MG/ML injection Inject 1 mL (150 mg) into the muscle every 3 months    insulin lispro (HUMALOG) 100 UNIT/ML injection Uses up to 75 units per day in insulin pump for basal, bolus, and priming of insulin pump tubing.    oseltamivir (TAMIFLU) 75 MG capsule Take 1 capsule (75 mg) by mouth 2 times daily    Blood Pressure KIT 1 kit daily    insulin pen needle (B-D U/F) 31G X 8 MM Use 6 daily or as directed.    insulin glargine (LANTUS SOLOSTAR) 100 UNIT/ML PEN 42 units daily    insulin lispro (HUMALOG KWIKPEN) 100 UNIT/ML soln Inject 1 U per 5 grams CHO. Total daily dose ~ 40 U.    insulin syringe-needle U-100 (GNP ULTRA COM INSULIN SYRINGE) 31G X 5/16\" 0.5 ML MISC Any brand, to be used with subcutaneous insulin injection 4-8 times/day. "    Blood Glucose Monitoring Suppl (BLOOD GLUCOSE METER) KIT 1 kit 3 times daily.        Family History   Mother has hypothyroidism. Father has hypercholesterolemia. Grandfather melanoma. No known family history of hypertension, death at a young age, stroke.     Social History  Single. No children. She denies smoking, drinking alcohol or using illicit drugs. Occupation: .     Review of Systems   Systemic:               No fatigue   Eye:                       No eye symptoms   Anmol-Laryngeal:      No anmol-laryngeal symptoms, no dysphagia, no voice hoarseness, no cough      Breast:                   No breast symptoms  Cardiovascular:     No cardiovascular symptoms, no CP or palpitations   Pulmonary:            No pulmonary symptoms, no cough or SOB    Gastrointestinal:    No gastrointestinal symptoms, no diarrhea or constipation   Genitourinary:        No genitourinary symptoms    Endocrine:             No endocrine symptoms, no heat or cold intolerance, no increased sweating, no easy bruising      Neurological:         Longstanding headaches present once every 2 weeks and stable, no tremor, no dizziness     Musculoskeletal:   Intermittent lower back and shoulder pain  Skin:                       No skin symptoms   Psychological:        Gets anxious easily           Vital Signs     Previous Weights:    Wt Readings from Last 10 Encounters:   12/08/17 64.5 kg (142 lb 1.6 oz)   11/07/17 64.6 kg (142 lb 8 oz)   09/21/17 63 kg (139 lb)   04/04/17 62.6 kg (138 lb)   03/13/17 (P) 63.5 kg (140 lb)   10/14/16 61.2 kg (135 lb)   07/26/16 61.3 kg (135 lb 1.6 oz)   04/05/16 62 kg (136 lb 9.6 oz)   01/05/16 61.4 kg (135 lb 6.4 oz)   10/28/15 60.2 kg (132 lb 12.8 oz)     Vital Signs:  BP (!) 154/98  Pulse 130  Blood pressure on recheck 154/92 with a pulse of 71    Physical Exam  General Appearance:  she is well developed, well nourished and in no distress     Eyes:  conjutivae and extra-ocular motions are normal.                                     pupils round and reactive to light, no lid lag, no stare    HEENT:   oropharynx clear and moist, no JVD, no bruits      no thyromegaly, no palpable nodules   Gastrointestinal: abdomen soft, non-tender, non-distended, normal bowel sounds,   no organomegaly   Musculoskeletal: normal tone and strength  Psychiatric: affect and judgment normal  Skin: warm, no lesions   Neurological: reflexes normal and symmetric, no resting tremor,   Feet                          sensation intact to monofilament testing; B/L heels calluses, dry skin       Lab Results  I reviewed prior lab results documented in Epic.    Lab Results   Component Value Date    A1C 7.8 01/02/2018    A1C 8.1 07/26/2016    A1C 8.6 (A) 04/05/2016    A1C 8.5 (H) 01/05/2016    A1C 9.7 (H) 09/15/2015       Hemoglobin   Date Value Ref Range Status   09/22/2008 16.0 (H) 11.7 - 15.7 g/dL Final     Hematocrit   Date Value Ref Range Status   09/22/2008 46.0 35.0 - 47.0 % Final     Cholesterol (mg/dL)   Date Value   01/03/2013 188     Cholesterol/HDL Ratio (no units)   Date Value   01/03/2013 5.0     HDL Cholesterol (mg/dL)   Date Value   01/03/2013 40 (L)     LDL Cholesterol Calculated (mg/dL)   Date Value   01/03/2013 118     LDL Cholesterol Direct (mg/dL)   Date Value   01/06/2015 89     No results found for: MICROALBUMIN    TSH   Date Value Ref Range Status   01/06/2015 1.41 0.40 - 4.00 mU/L Final     Comment:     Effective 7/30/2014, the reference range for this assay has changed to reflect   new instrumentation/methodology.         Last Basic Metabolic Panel:    Sodium   Date Value Ref Range Status   09/15/2015 140 133 - 144 mmol/L Final      Potassium   Date Value Ref Range Status   01/05/2016 4.3 3.4 - 5.3 mmol/L Final     Chloride   Date Value Ref Range Status   09/15/2015 110 (H) 94 - 109 mmol/L Final     Calcium   Date Value Ref Range Status   09/15/2015 9.0 8.5 - 10.1 mg/dL Final     Carbon Dioxide   Date Value Ref Range Status    09/15/2015 25 20 - 32 mmol/L Final     Urea Nitrogen   Date Value Ref Range Status   09/15/2015 6 (L) 7 - 30 mg/dL Final     Creatinine   Date Value Ref Range Status   09/15/2015 0.62 0.52 - 1.04 mg/dL Final     Creatinine For Methaneph 24 H/Random Urine   Date Value Ref Range Status   04/04/2016 90  Final     Comment:     Unit: mg/dL     Glucose   Date Value Ref Range Status   09/15/2015 231 (H) 70 - 99 mg/dL Final       No results found for: AST  No results found for: ALT  Albumin   Date Value Ref Range Status   11/03/2010 4.5 3.9 - 5.1 g/dL Final       Assessment     1. Type 1 diabetes, not at goal, complicated by mild diabetic retinopathy and intermittent microalbuminuria. On the most recent lab results from 2016, 24-hour urinary protein were normal.    Recommendations:  Change basal rate to the following settings:  12 midnight 1.45 U/h, 2 AM 1.5 U/h, 6 AM 1.45 U/h, 1 PM 1.5 U/h, 6 PM 1.45 U/h  Avoid using the square wave bolus  Use the bolus wizard for all meals and snacks  Labwork: lipid panel, urine micro albumin, H, CMP (extended prior lab orders)  Have the pump and the sensor, downloaded for my review as needed    2. Elevated blood pressure    Screening for pheochromocytoma and cushing syndrome was negative.  I recommended a 24-hour blood pressure monitoring in the past but she has not seen her primary care provider for this.  I also recommended and aldosterone to rennin ratio and she has not had lab work done.  My impression is that she has severe anxiety which triggers the elevated blood pressure.  However, given her age, I would prefer to rule out secondary causes of hypertension.  I recommended to have lab work done midmorning, in order to also check the aldosterone and renin level.    Orders Placed This Encounter   Procedures     Hematocrit

## 2018-01-02 NOTE — NURSING NOTE
"Feliciano Arreguin's goals for this visit include:   Chief Complaint   Patient presents with     Diabetes       She requests these members of her care team be copied on today's visit information: Stacey Sandoval      PCP: Stacey Sandoval    Referring Provider:  Eda De Dios MD  [id number] [place] [address]    Chief Complaint   Patient presents with     Diabetes       Initial BP (!) 154/98  Pulse 130 Estimated body mass index is 26.32 kg/(m^2) as calculated from the following:    Height as of 12/8/17: 1.565 m (5' 1.61\").    Weight as of 12/8/17: 64.5 kg (142 lb 1.6 oz).  Medication Reconciliation: complete    Do you need any medication refills at today's visit? No    Gabriela Gerber LPN    "

## 2018-01-08 ENCOUNTER — MYC REFILL (OUTPATIENT)
Dept: ENDOCRINOLOGY | Facility: CLINIC | Age: 23
End: 2018-01-08

## 2018-01-08 DIAGNOSIS — E10.9 TYPE 1 DIABETES MELLITUS NOT AT GOAL (H): ICD-10-CM

## 2018-01-08 NOTE — TELEPHONE ENCOUNTER
Message from Flavorvanilhart:  Original authorizing provider: MD Feliciano Yusuf would like a refill of the following medications:  insulin lispro (HUMALOG) 100 UNIT/ML injection [Eda De Dios MD]    Preferred pharmacy: [facility] [address] [place] [id number] [facility]    Comment:  Can someone please approve or send a new refill request for Humalog to [facility] pharmacy in [place]. Thanks!

## 2018-02-06 ENCOUNTER — DOCUMENTATION ONLY (OUTPATIENT)
Dept: ENDOCRINOLOGY | Facility: CLINIC | Age: 23
End: 2018-02-06

## 2018-02-06 NOTE — PROGRESS NOTES
[employer] prescription for insulin pump and diabetic supplies faxed to [facility] at [phone].     Copy will be sent to scanning.    Mireya Wong RN  Endocrine Care Coordinator  [facility]

## 2018-03-09 ENCOUNTER — ALLIED HEALTH/NURSE VISIT (OUTPATIENT)
Dept: FAMILY MEDICINE | Facility: CLINIC | Age: 23
End: 2018-03-09
Payer: COMMERCIAL

## 2018-03-09 PROCEDURE — 96372 THER/PROPH/DIAG INJ SC/IM: CPT

## 2018-03-09 NOTE — PROGRESS NOTES
BP: Data Unavailable    LAST PAP/EXAM: Lab Results       Component                Value               Date                       PAP                      NIL                 10/14/2016            URINE HCG:not indicated    The following medication was given:     MEDICATION: Depo Provera 150mg  ROUTE: IM  SITE: Deltoid - Right, per pt   : Teva   LOT #: [id number]  EXP:04/19  NEXT INJECTION DUE: 5/25/18 - 6/8/18   Provider: Dr. Sandoval   Prior to injection verified patient identity using patient's name and date of birth.  Margoth Rodriguez CMA

## 2018-03-09 NOTE — MR AVS SNAPSHOT
After Visit Summary   3/9/2018    Feliciano Arreguin    MRN: [id number]           Patient Information     Date Of Birth          1995        Visit Information        Provider Department      3/9/2018 3:15 PM JABIER BARROW [facility]        Today's Diagnoses     Contraception    -  1       Follow-ups after your visit        Your next 10 appointments already scheduled     Jul 24, 2018  9:45 AM CDT   Return Visit with Eda De Dios MD, MG ENDO NURSE   [facility] ([facility]    [address] [place] [id number] [phone]              Who to contact     If you have questions or need follow up information about today's clinic visit or your schedule please contact [facility] directly at [phone].  Normal or non-critical lab and imaging results will be communicated to you by MyChart, letter or phone within 4 business days after the clinic has received the results. If you do not hear from us within 7 days, please contact the clinic through MyChart or phone. If you have a critical or abnormal lab result, we will notify you by phone as soon as possible.  Submit refill requests through [employer] or call your pharmacy and they will forward the refill request to us. Please allow 3 business days for your refill to be completed.          Additional Information About Your Visit        MyChart Information     [employer] gives you secure access to your electronic health record. If you see a primary care provider, you can also send messages to your care team and make appointments. If you have questions, please call your primary care clinic.  If you do not have a primary care provider, please call [phone] and they will assist you.        Care EveryWhere ID     This is your Care EveryWhere ID. This could be used by other organizations to access your [facility] medical records  [id number]         Blood Pressure from Last 3  Encounters:   01/02/18 (!) 154/98   12/08/17 156/72   11/07/17 124/84    Weight from Last 3 Encounters:   12/08/17 142 lb 1.6 oz (64.5 kg)   11/07/17 142 lb 8 oz (64.6 kg)   09/21/17 139 lb (63 kg)              We Performed the Following     Medroxyprogesterone inj/1mg (Depo Provera J-Code)        Primary Care Provider Office Phone # Fax #    Stacey Sandoval PA-C [phone] [address] [place] [address]        Equal Access to Services     [facility]: Hadii aad ku hadasho Sosree, waaxda luqadaha, qaybta kaalmada adepreetyada, juanito pierre . So [facility] [phone].    ATENCIÓN: Si habla español, tiene a theodore disposición servicios gratuitos de asistencia lingüística. Llame al [phone].    We comply with applicable federal civil rights laws and [place] laws. We do not discriminate on the basis of race, color, national origin, age, disability, sex, sexual orientation, or gender identity.            Thank you!     Thank you for choosing [facility]  for your care. Our goal is always to provide you with excellent care. Hearing back from our patients is one way we can continue to improve our services. Please take a few minutes to complete the written survey that you may receive in the mail after your visit with us. Thank you!             Your Updated Medication List - Protect others around you: Learn how to safely use, store and throw away your medicines at www.disposemymeds.org.          This list is accurate as of 3/9/18  3:24 PM.  Always use your most recent med list.                   Brand Name Dispense Instructions for use Diagnosis    blood glucose monitoring meter device kit    no brand specified    1 kit    1 kit 3 times daily.    Type 1 diabetes, HbA1c goal < 7% (H)       blood glucose monitoring test strip    RACHEL CONTOUR NEXT    100 strip    Use to test blood sugar 4 times daily or as directed.    Type 1 diabetes mellitus, uncontrolled  "(H)       Blood Pressure Kit     1 kit    1 kit daily    Secondary hypertension, unspecified       insulin glargine 100 UNIT/ML injection    LANTUS SOLOSTAR    3 Month    42 units daily    Type 1 diabetes, HbA1c goal < 7% (H)       * insulin lispro 100 UNIT/ML injection    HumaLOG KWIKpen    3 Month    Inject 1 U per 5 grams CHO. Total daily dose ~ 40 U.    Type 1 diabetes, HbA1c goal < 7% (H)       * insulin lispro 100 UNIT/ML injection    humaLOG    8 vial    Uses up to 75 units per day in insulin pump for basal, bolus, and priming of insulin pump tubing.    Type 1 diabetes mellitus not at goal (H)       insulin pen needle 31G X 8 MM    B-D U/F    200 each    Use 6 daily or as directed.    Type 1 diabetes, HbA1c goal < 7% (H)       insulin syringe-needle U-100 31G X 5/16\" 0.5 ML    GNP ULTRA COM INSULIN SYRINGE    720 each    Any brand, to be used with subcutaneous insulin injection 4-8 times/day.    Type 1 diabetes, HbA1c goal < 7% (H)       medroxyPROGESTERone 150 MG/ML injection    DEPO-PROVERA    3 mL    Inject 1 mL (150 mg) into the muscle every 3 months    Encounter for surveillance of injectable contraceptive       * Notice:  This list has 2 medication(s) that are the same as other medications prescribed for you. Read the directions carefully, and ask your doctor or other care provider to review them with you.      "

## 2018-04-10 ENCOUNTER — TELEPHONE (OUTPATIENT)
Dept: PHARMACY | Facility: OTHER | Age: 23
End: 2018-04-10

## 2018-04-10 NOTE — TELEPHONE ENCOUNTER
MTM referral from: Patient's insurance ([employer])    MTM referral outreach attempt #1 on April 10, 2018 at 12:39 PM      Outcome: Left Message    Micaela Card, Pharmacy Intern

## 2018-05-07 NOTE — TELEPHONE ENCOUNTER
MTM referral from: [employer] recruitment    MTM referral outreach attempt #2 on May 7, 2018 at 3:02 PM      Outcome: Left Message    Tammi Stiles, MTM Coordinator Intern

## 2018-05-16 NOTE — PROGRESS NOTES
SUBJECTIVE:   Feliciano Arreguin is a 23 year old female who presents to clinic today for the following health issues:      HPI     Acute Illness   Acute illness concerns: sinus pressure  Onset: Month or so     Fever: no     Chills/Sweats: no     Headache (location?): YES    Sinus Pressure:YES    Conjunctivitis:  YES, L eye pain     Ear Pain: no    Rhinorrhea: YES    Congestion: YES    Sore Throat: no      Cough: YES - slight    Wheeze: no     Decreased Appetite: no     Nausea: no    Vomiting: no    Diarrhea:  no    Dysuria/Freq.: no    Fatigue/Achiness: YES- some fatigue     Sick/Strep Exposure: no      Therapies Tried and outcome: ibuprofen , afrin     States she is taking Zertec otc for about 3 days seasonal allergies she usually is symptomatic in spring.   States blood sugar has been running 150 states this is about her usually     Problem list and histories reviewed & adjusted, as indicated.     Additional history: as documented        Patient Active Problem List   Diagnosis     Eczema     Sinus tachycardia     CARDIOVASCULAR SCREENING; LDL GOAL LESS THAN 130     Contraception     Uncontrolled type 1 diabetes mellitus with nephropathy (H)     Type 1 diabetes mellitus not at goal (H)     Past Surgical History:   Procedure Laterality Date     EYE SURGERY      lazy eye correction (strabismus?)       Social History   Substance Use Topics     Smoking status: Never Smoker     Smokeless tobacco: Never Used      Comment: no smokers in household     Alcohol use No     Family History   Problem Relation Age of Onset     Allergies Mother      seasonal     Thyroid Disease Mother      hypothyridism     Lipids Father      CANCER Paternal Grandfather      skin c/a     Asthma No family hx of      C.A.D. No family hx of      DIABETES No family hx of      Hypertension No family hx of      CEREBROVASCULAR DISEASE No family hx of      Breast Cancer No family hx of      Cancer - colorectal No family hx of      Prostate Cancer No  "family hx of      Alcohol/Drug No family hx of      Alzheimer Disease No family hx of      Anesthesia Reaction No family hx of      Arthritis No family hx of      Blood Disease No family hx of      Cardiovascular No family hx of      Circulatory No family hx of      Congenital Anomalies No family hx of      Connective Tissue Disorder No family hx of      Depression No family hx of      Eye Disorder No family hx of      Genetic Disorder No family hx of      GASTROINTESTINAL DISEASE No family hx of      Genitourinary Problems No family hx of      Gynecology No family hx of      HEART DISEASE No family hx of      Musculoskeletal Disorder No family hx of      Neurologic Disorder No family hx of      Obesity No family hx of      OSTEOPOROSIS No family hx of      Psychotic Disorder No family hx of      Respiratory No family hx of      Hearing Loss No family hx of          Allergies   Allergen Reactions     Nkda [No Known Drug Allergies]      Seasonal Allergies      Spring/Fall     BP Readings from Last 3 Encounters:   05/18/18 118/70   01/02/18 (!) 154/98   12/08/17 156/72    Wt Readings from Last 3 Encounters:   05/18/18 138 lb 3.2 oz (62.7 kg)   12/08/17 142 lb 1.6 oz (64.5 kg)   11/07/17 142 lb 8 oz (64.6 kg)        ROS:  Constitutional, HEENT, cardiovascular, pulmonary, gi and gu systems are negative, except as otherwise noted.    OBJECTIVE:     /70  Pulse 98  Temp 98.3  F (36.8  C) (Oral)  Resp 16  Ht 5' 1.6\" (1.565 m)  Wt 138 lb 3.2 oz (62.7 kg)  BMI 25.61 kg/m2  Body mass index is 25.61 kg/(m^2).  GENERAL: healthy, alert and mild distress  EYES: Eyes grossly normal to inspection, PERRL and conjunctivae and sclerae normal  HENT: normal cephalic/atraumatic, ear canals and TM's normal, nose and mouth without ulcers or lesions, nasal mucosa edematous , rhinorrhea yellow and thick, oropharynx clear and oral mucous membranes moist  NECK: bilateral anterior cervical adenopathy, no asymmetry, masses, or scars " and thyroid normal to palpation  RESP: lungs clear to auscultation - no rales, rhonchi or wheezes  CV: regular rate and rhythm, normal S1 S2, nosli S3 or S4, no murmur, click or rub, no peripheral edema and peripheral pulses strong    ASSESSMENT/PLAN:     1. Bacterial sinusitis  Side effects of medications, proper use, the associated risk/benefits and other options were discussed. Patient understands these risks and agrees to take the medication as instructed.     - azithromycin (ZITHROMAX Z-ONEIDA) 250 MG tablet; Two tablets first day, then one tablet daily for four days  Dispense: 6 tablet; Refill: 0    Patient Instructions   Please take antibiotic with a probiotic or yogurt (3 small containers) daily not directly with the antibiotic for optimal good bacterial protection.     May use saline nasal spray as needed to help thin and remove thick mucous from sinuses. Benadryl at bedtime may help with symptoms of sinus congestion 25 mg - 50 mg.     Continue Zyrtec for seasonal allergies.     Return to clinic if symptoms do not improve or worsen.     Thank you  Anna Kaur [facility]

## 2018-05-18 ENCOUNTER — OFFICE VISIT (OUTPATIENT)
Dept: FAMILY MEDICINE | Facility: OTHER | Age: 23
End: 2018-05-18
Payer: COMMERCIAL

## 2018-05-18 VITALS
HEIGHT: 62 IN | RESPIRATION RATE: 16 BRPM | TEMPERATURE: 98.3 F | DIASTOLIC BLOOD PRESSURE: 70 MMHG | HEART RATE: 98 BPM | BODY MASS INDEX: 25.43 KG/M2 | WEIGHT: 138.2 LBS | SYSTOLIC BLOOD PRESSURE: 118 MMHG

## 2018-05-18 DIAGNOSIS — J32.9 BACTERIAL SINUSITIS: Primary | ICD-10-CM

## 2018-05-18 DIAGNOSIS — B96.89 BACTERIAL SINUSITIS: Primary | ICD-10-CM

## 2018-05-18 PROCEDURE — 99213 OFFICE O/P EST LOW 20 MIN: CPT | Performed by: NURSE PRACTITIONER

## 2018-05-18 RX ORDER — AZITHROMYCIN 250 MG/1
TABLET, FILM COATED ORAL
Qty: 6 TABLET | Refills: 0 | Status: SHIPPED | OUTPATIENT
Start: 2018-05-18 | End: 2018-07-24

## 2018-05-18 NOTE — MR AVS SNAPSHOT
After Visit Summary   5/18/2018    Feliciano Arreguin    MRN: [id number]           Patient Information     Date Of Birth          1995        Visit Information        Provider Department      5/18/2018 8:40 AM Anna Kaur APRN CNP [facility]        Today's Diagnoses     Bacterial sinusitis    -  1      Care Instructions    Please take antibiotic with a probiotic or yogurt (3 small containers) daily not directly with the antibiotic for optimal good bacterial protection.     May use saline nasal spray as needed to help thin and remove thick mucous from sinuses. Benadryl at bedtime may help with symptoms of sinus congestion 25 mg - 50 mg.     Continue Zyrtec for seasonal allergies.     Return to clinic if symptoms do not improve or worsen.     Thank you  Anna Kaur CNP            Follow-ups after your visit        Your next 10 appointments already scheduled     Jul 24, 2018  9:45 AM CDT   Return Visit with Eda De Dios MD, MG ENDO NURSE   [facility]    [address] [place] [address] [phone]              Who to contact     If you have questions or need follow up information about today's clinic visit or your schedule please contact [facility] directly at [phone].  Normal or non-critical lab and imaging results will be communicated to you by MyChart, letter or phone within 4 business days after the clinic has received the results. If you do not hear from us within 7 days, please contact the clinic through MyChart or phone. If you have a critical or abnormal lab result, we will notify you by phone as soon as possible.  Submit refill requests through [employer] or call your pharmacy and they will forward the refill request to us. Please allow 3 business days for your refill to be completed.          Additional Information About Your Visit        MyChart Information      "Nick gives you secure access to your electronic health record. If you see a primary care provider, you can also send messages to your care team and make appointments. If you have questions, please call your primary care clinic.  If you do not have a primary care provider, please call [phone] and they will assist you.        Care EveryWhere ID     This is your Care EveryWhere ID. This could be used by other organizations to access your [place] medical records  [id number]        Your Vitals Were     Pulse Temperature Respirations Height BMI (Body Mass Index)       98 98.3  F (36.8  C) (Oral) 16 5' 1.6\" (1.565 m) 25.61 kg/m2        Blood Pressure from Last 3 Encounters:   05/18/18 118/70   01/02/18 (!) 154/98   12/08/17 156/72    Weight from Last 3 Encounters:   05/18/18 138 lb 3.2 oz (62.7 kg)   12/08/17 142 lb 1.6 oz (64.5 kg)   11/07/17 142 lb 8 oz (64.6 kg)              Today, you had the following     No orders found for display         Today's Medication Changes          These changes are accurate as of 5/18/18  9:07 AM.  If you have any questions, ask your nurse or doctor.               Start taking these medicines.        Dose/Directions    azithromycin 250 MG tablet   Commonly known as:  ZITHROMAX Z-ONEIDA   Used for:  Bacterial sinusitis   Started by:  Anna Kaur APRN CNP        Two tablets first day, then one tablet daily for four days   Quantity:  6 tablet   Refills:  0            Where to get your medicines      These medications were sent to [facility] [address] [place] [address] [facility]  [address] [facility] [address]     Phone:  [phone]     azithromycin 250 MG tablet                Primary Care Provider Office Phone # Fax #    Stacey Sandoval PA-C [phone] [address] [place] [address]        Equal Access to Services     [facility] SHIRIN AH: Yanely perales Sosree, waaxda luqadaha, qaybta juanito yip " anitapreet maicolnick la'aan ah. So [facility] [phone].    ATENCIÓN: Si khushbu brambila, tiene a theodore disposición servicios gratuitos de asistencia lingüística. Caren al [phone].    We comply with applicable federal civil rights laws and [place] laws. We do not discriminate on the basis of race, color, national origin, age, disability, sex, sexual orientation, or gender identity.            Thank you!     Thank you for choosing [facility]  for your care. Our goal is always to provide you with excellent care. Hearing back from our patients is one way we can continue to improve our services. Please take a few minutes to complete the written survey that you may receive in the mail after your visit with us. Thank you!             Your Updated Medication List - Protect others around you: Learn how to safely use, store and throw away your medicines at www.disposemymeds.org.          This list is accurate as of 5/18/18  9:07 AM.  Always use your most recent med list.                   Brand Name Dispense Instructions for use Diagnosis    azithromycin 250 MG tablet    ZITHROMAX Z-ONEIDA    6 tablet    Two tablets first day, then one tablet daily for four days    Bacterial sinusitis       blood glucose monitoring meter device kit    no brand specified    1 kit    1 kit 3 times daily.    Type 1 diabetes, HbA1c goal < 7% (H)       blood glucose monitoring test strip    RACHEL CONTOUR NEXT    100 strip    Use to test blood sugar 4 times daily or as directed.    Type 1 diabetes mellitus, uncontrolled (H)       Blood Pressure Kit     1 kit    1 kit daily    Secondary hypertension, unspecified       insulin lispro 100 UNIT/ML injection    humaLOG    8 vial    Uses up to 75 units per day in insulin pump for basal, bolus, and priming of insulin pump tubing.    Type 1 diabetes mellitus not at goal (H)       insulin pen needle 31G X 8 MM    B-D U/F    200 each    Use 6 daily or as directed.    Type 1 diabetes, HbA1c goal < 7% (H)        medroxyPROGESTERone 150 MG/ML injection    DEPO-PROVERA    3 mL    Inject 1 mL (150 mg) into the muscle every 3 months    Encounter for surveillance of injectable contraceptive

## 2018-05-18 NOTE — PATIENT INSTRUCTIONS
Please take antibiotic with a probiotic or yogurt (3 small containers) daily not directly with the antibiotic for optimal good bacterial protection.     May use saline nasal spray as needed to help thin and remove thick mucous from sinuses. Benadryl at bedtime may help with symptoms of sinus congestion 25 mg - 50 mg.     Continue Zyrtec for seasonal allergies.     Return to clinic if symptoms do not improve or worsen.     Thank you  Anna Karu CNP

## 2018-05-30 ENCOUNTER — ALLIED HEALTH/NURSE VISIT (OUTPATIENT)
Dept: FAMILY MEDICINE | Facility: OTHER | Age: 23
End: 2018-05-30
Payer: COMMERCIAL

## 2018-05-30 DIAGNOSIS — Z30.42 ENCOUNTER FOR SURVEILLANCE OF INJECTABLE CONTRACEPTIVE: Primary | ICD-10-CM

## 2018-05-30 DIAGNOSIS — R03.0 ELEVATED BLOOD PRESSURE READING WITHOUT DIAGNOSIS OF HYPERTENSION: ICD-10-CM

## 2018-05-30 DIAGNOSIS — E10.9 TYPE 1 DIABETES MELLITUS NOT AT GOAL (H): ICD-10-CM

## 2018-05-30 LAB
ALBUMIN SERPL-MCNC: 4.1 G/DL (ref 3.4–5)
ALP SERPL-CCNC: 55 U/L (ref 40–150)
ALT SERPL W P-5'-P-CCNC: 35 U/L (ref 0–50)
ANION GAP SERPL CALCULATED.3IONS-SCNC: 10 MMOL/L (ref 3–14)
AST SERPL W P-5'-P-CCNC: 20 U/L (ref 0–45)
BILIRUB SERPL-MCNC: 0.3 MG/DL (ref 0.2–1.3)
BUN SERPL-MCNC: 10 MG/DL (ref 7–30)
CALCIUM SERPL-MCNC: 8.8 MG/DL (ref 8.5–10.1)
CHLORIDE SERPL-SCNC: 101 MMOL/L (ref 94–109)
CHOLEST SERPL-MCNC: 190 MG/DL
CO2 SERPL-SCNC: 24 MMOL/L (ref 20–32)
CREAT SERPL-MCNC: 0.68 MG/DL (ref 0.52–1.04)
CREAT UR-MCNC: 45 MG/DL
GFR SERPL CREATININE-BSD FRML MDRD: >90 ML/MIN/1.7M2
GLUCOSE SERPL-MCNC: 178 MG/DL (ref 70–99)
HCT VFR BLD AUTO: 45 % (ref 35–47)
HDLC SERPL-MCNC: 65 MG/DL
LDLC SERPL CALC-MCNC: 110 MG/DL
MICROALBUMIN UR-MCNC: 29 MG/L
MICROALBUMIN/CREAT UR: 63.78 MG/G CR (ref 0–25)
NONHDLC SERPL-MCNC: 125 MG/DL
POTASSIUM SERPL-SCNC: 4.4 MMOL/L (ref 3.4–5.3)
PROT SERPL-MCNC: 7.9 G/DL (ref 6.8–8.8)
SODIUM SERPL-SCNC: 135 MMOL/L (ref 133–144)
TRIGL SERPL-MCNC: 75 MG/DL
TSH SERPL DL<=0.005 MIU/L-ACNC: 2.04 MU/L (ref 0.4–4)

## 2018-05-30 PROCEDURE — 84443 ASSAY THYROID STIM HORMONE: CPT | Performed by: INTERNAL MEDICINE

## 2018-05-30 PROCEDURE — 80061 LIPID PANEL: CPT | Performed by: INTERNAL MEDICINE

## 2018-05-30 PROCEDURE — 85014 HEMATOCRIT: CPT | Performed by: INTERNAL MEDICINE

## 2018-05-30 PROCEDURE — 99000 SPECIMEN HANDLING OFFICE-LAB: CPT | Performed by: INTERNAL MEDICINE

## 2018-05-30 PROCEDURE — 96372 THER/PROPH/DIAG INJ SC/IM: CPT

## 2018-05-30 PROCEDURE — 99207 ZZC NO CHARGE NURSE ONLY: CPT

## 2018-05-30 PROCEDURE — 82043 UR ALBUMIN QUANTITATIVE: CPT | Performed by: INTERNAL MEDICINE

## 2018-05-30 PROCEDURE — 82088 ASSAY OF ALDOSTERONE: CPT | Mod: 90 | Performed by: INTERNAL MEDICINE

## 2018-05-30 PROCEDURE — 36415 COLL VENOUS BLD VENIPUNCTURE: CPT | Performed by: INTERNAL MEDICINE

## 2018-05-30 PROCEDURE — 84244 ASSAY OF RENIN: CPT | Mod: 90 | Performed by: INTERNAL MEDICINE

## 2018-05-30 PROCEDURE — 80053 COMPREHEN METABOLIC PANEL: CPT | Performed by: INTERNAL MEDICINE

## 2018-05-30 NOTE — NURSING NOTE
BP: Data Unavailable    LAST PAP/EXAM:   Lab Results   Component Value Date    PAP NIL 10/14/2016     URINE HCG:not indicated    The following medication was given:     MEDICATION: Depo Provera 150mg  ROUTE: IM  SITE: Deltoid - Left  : Singspiel  LOT #: [id number]  EXP:07/2020  NEXT INJECTION DUE: 8/15/18 - 8/29/18   Provider: Rica

## 2018-05-30 NOTE — MR AVS SNAPSHOT
After Visit Summary   5/30/2018    Feliciano Arreguin    MRN: [id number]           Patient Information     Date Of Birth          1995        Visit Information        Provider Department      5/30/2018 8:30 AM NL FLOPJ NURSE [facility]        Today's Diagnoses     Encounter for surveillance of injectable contraceptive    -  1       Follow-ups after your visit        Your next 10 appointments already scheduled     Jul 24, 2018  9:45 AM CDT   Return Visit with Eda De Dios MD, MG ENDO NURSE   [facility]    [address] [place] [address] [phone]              Who to contact     If you have questions or need follow up information about today's clinic visit or your schedule please contact [facility] directly at [phone].  Normal or non-critical lab and imaging results will be communicated to you by MyChart, letter or phone within 4 business days after the clinic has received the results. If you do not hear from us within 7 days, please contact the clinic through MyChart or phone. If you have a critical or abnormal lab result, we will notify you by phone as soon as possible.  Submit refill requests through [employer] or call your pharmacy and they will forward the refill request to us. Please allow 3 business days for your refill to be completed.          Additional Information About Your Visit        MyChart Information     [employer] gives you secure access to your electronic health record. If you see a primary care provider, you can also send messages to your care team and make appointments. If you have questions, please call your primary care clinic.  If you do not have a primary care provider, please call [phone] and they will assist you.        Care EveryWhere ID     This is your Care EveryWhere ID. This could be used by other organizations to access your [facility]  records  [id number]         Blood Pressure from Last 3 Encounters:   05/18/18 118/70   01/02/18 (!) 154/98   12/08/17 156/72    Weight from Last 3 Encounters:   05/18/18 138 lb 3.2 oz (62.7 kg)   12/08/17 142 lb 1.6 oz (64.5 kg)   11/07/17 142 lb 8 oz (64.6 kg)              We Performed the Following     INJECTION INTRAMUSCULAR OR SUB-Q     Medroxyprogesterone inj/1mg (Depo Provera J-Code)        Primary Care Provider Office Phone # Fax #    Stacey Sandoval PA-C [phone] [address] [place] [address]        Equal Access to Services     LAM CARPIO : Hadii aad ku hadasho Sosree, waaxda luqadaha, qaybta kaalmada adeegyada, juanito woodard. So [facility] [phone].    ATENCIÓN: Si habla español, tiene a theodore disposición servicios gratuitos de asistencia lingüística. Ll[facility] [phone].    We comply with applicable federal civil rights laws and [place] laws. We do not discriminate on the basis of race, color, national origin, age, disability, sex, sexual orientation, or gender identity.            Thank you!     Thank you for choosing [facility]  for your care. Our goal is always to provide you with excellent care. Hearing back from our patients is one way we can continue to improve our services. Please take a few minutes to complete the written survey that you may receive in the mail after your visit with us. Thank you!             Your Updated Medication List - Protect others around you: Learn how to safely use, store and throw away your medicines at www.disposemymeds.org.          This list is accurate as of 5/30/18  9:41 AM.  Always use your most recent med list.                   Brand Name Dispense Instructions for use Diagnosis    azithromycin 250 MG tablet    ZITHROMAX Z-ONEIDA    6 tablet    Two tablets first day, then one tablet daily for four days    Bacterial sinusitis       blood glucose monitoring meter device kit    no brand  specified    1 kit    1 kit 3 times daily.    Type 1 diabetes, HbA1c goal < 7% (H)       blood glucose monitoring test strip    RACHEL CONTOUR NEXT    100 strip    Use to test blood sugar 4 times daily or as directed.    Type 1 diabetes mellitus, uncontrolled (H)       Blood Pressure Kit     1 kit    1 kit daily    Secondary hypertension, unspecified       insulin lispro 100 UNIT/ML injection    humaLOG    8 vial    Uses up to 75 units per day in insulin pump for basal, bolus, and priming of insulin pump tubing.    Type 1 diabetes mellitus not at goal (H)       insulin pen needle 31G X 8 MM    B-D U/F    200 each    Use 6 daily or as directed.    Type 1 diabetes, HbA1c goal < 7% (H)       medroxyPROGESTERone 150 MG/ML injection    DEPO-PROVERA    3 mL    Inject 1 mL (150 mg) into the muscle every 3 months    Encounter for surveillance of injectable contraceptive

## 2018-06-01 LAB
ALDOST SERPL-MCNC: 4.7 NG/DL
RENIN PLAS-CCNC: 1 NG/ML/HR

## 2018-06-04 NOTE — PROGRESS NOTES
There is a persistent amount of proteins in the urine. The level appears to be lower compared with the prior results. All the other lab results are normal.

## 2018-07-24 ENCOUNTER — OFFICE VISIT (OUTPATIENT)
Dept: ENDOCRINOLOGY | Facility: CLINIC | Age: 23
End: 2018-07-24
Payer: COMMERCIAL

## 2018-07-24 VITALS
BODY MASS INDEX: 26.86 KG/M2 | SYSTOLIC BLOOD PRESSURE: 164 MMHG | OXYGEN SATURATION: 98 % | HEIGHT: 62 IN | DIASTOLIC BLOOD PRESSURE: 107 MMHG | HEART RATE: 122 BPM | WEIGHT: 145.94 LBS

## 2018-07-24 DIAGNOSIS — I10 BENIGN ESSENTIAL HYPERTENSION: Primary | ICD-10-CM

## 2018-07-24 DIAGNOSIS — E10.9 TYPE 1 DIABETES MELLITUS NOT AT GOAL (H): ICD-10-CM

## 2018-07-24 LAB — HBA1C MFR BLD: 7.4 % (ref 0–5.7)

## 2018-07-24 PROCEDURE — 36415 COLL VENOUS BLD VENIPUNCTURE: CPT | Performed by: INTERNAL MEDICINE

## 2018-07-24 PROCEDURE — 95251 CONT GLUC MNTR ANALYSIS I&R: CPT | Performed by: INTERNAL MEDICINE

## 2018-07-24 PROCEDURE — 99214 OFFICE O/P EST MOD 30 MIN: CPT | Performed by: INTERNAL MEDICINE

## 2018-07-24 PROCEDURE — 83036 HEMOGLOBIN GLYCOSYLATED A1C: CPT | Performed by: INTERNAL MEDICINE

## 2018-07-24 RX ORDER — LISINOPRIL 5 MG/1
5 TABLET ORAL DAILY
Qty: 90 TABLET | Refills: 3 | Status: SHIPPED | OUTPATIENT
Start: 2018-07-24 | End: 2019-02-14 | Stop reason: DRUGHIGH

## 2018-07-24 NOTE — NURSING NOTE
"Feliciano Arreguin's goals for this visit include: Follow up Diabetes  She requests these members of her care team be copied on today's visit information: YES    PCP: Stacey Sandoval    Referring Provider:  No referring provider defined for this encounter.    Ht 1.562 m (5' 1.5\")  Wt 66.2 kg (145 lb 15.1 oz)  BMI 27.13 kg/m2    Do you need any medication refills at today's visit? NO    "

## 2018-07-24 NOTE — MR AVS SNAPSHOT
After Visit Summary   7/24/2018    Feliciano Arreguin    MRN: [id number]           Patient Information     Date Of Birth          1995        Visit Information        Provider Department      7/24/2018 9:45 AM Eda De Dios MD;  ENDO NURSE [facility]        Today's Diagnoses     Benign essential hypertension    -  1    Type 1 diabetes mellitus not at goal (H)          Care Instructions    The days when you are boxing in the evening, have a 15 grams carbs snack at bedtime, with no insulin coverage.   Try to bolus 10-15 minutes prior to food intake   If not sure you are going to complete the meal, divide the amount and bolus twice   Avoid using the manual bolus and use the wizard for all meals and snacks           Sending blood sugars to your provider at [place]:  We want to help you with your diabetes management, which often requires frequent adjustments to your therapy. For your convenience, we have several ways to send your blood sugars to your doctor for review.    - Send message directly to your doctor through My Chart.  Please ask the rooming staff if you would like to sign up for My Chart.  This is a fast and confidential way to send your information and communicate directly with your provider.   - Record readings and fax to [phone].  We have a template for you to use for your convenience.  - Stop by the clinic with your meter for download if advised by provider.   - My Chart or call Anna Black, Diabetes Educator at [phone]  - Call the clinic and speak to one of the endocrine nurses to relay information on the telephone.  Mireya Nava,at [phone].   -    Please call the on-call Endocrinologist at the [facility] for after       hours/weekend needs at [phone] Option #4.    Please note that you do not need to FAST if you are just having an A1C drawn. Please remember to ALWAYS bring your glucose meter with to your appointment. This data is  very important for the management of your care.    Thank you!  Your [place] [facility] Care Team                Follow-ups after your visit        Follow-up notes from your care team     Return in about 6 months (around 1/24/2019).      Your next 10 appointments already scheduled     Aug 06, 2018  9:30 AM CDT   (Arrive by 9:00 AM)   [facility] with Staci Kramer LPC   [facility] ([facility])    [address]  [place] [address] [phone]            Sep 18, 2018 12:00 PM CDT   Telephone Visit with Eda De Dios MD   [facility]    [address] [place] [address] [phone]           Note: this is not an onsite visit; there is no need to come to the facility.            Jan 29, 2019  8:45 AM CST   Return Visit with Eda De Dios MD, MG ENDO NURSE   [facility])    [address] [place] [address] [phone]              Who to contact     If you have questions or need follow up information about today's clinic visit or your schedule please contact [facility] directly at [phone].  Normal or non-critical lab and imaging results will be communicated to you by [employer], letter or phone within 4 business days after the clinic has received the results. If you do not hear from us within 7 days, please contact the clinic through [employer] or phone. If you have a critical or abnormal lab result, we will notify you by phone as soon as possible.  Submit refill requests through [employer] or call your pharmacy and they will forward the refill request to us. Please allow 3 business days for your refill to be completed.          Additional Information About Your Visit        [employer] Information     [employer] gives you secure access to your electronic health record. If you see a  "primary care provider, you can also send messages to your care team and make appointments. If you have questions, please call your primary care clinic.  If you do not have a primary care provider, please call [phone] and they will assist you.      [employer] is an electronic gateway that provides easy, online access to your medical records. With [employer], you can request a clinic appointment, read your test results, renew a prescription or communicate with your care team.     To access your existing account, please contact your [facility] or call [phone] for assistance.        Care EveryWhere ID     This is your Care EveryWhere ID. This could be used by other organizations to access your [place] medical records  [id number]        Your Vitals Were     Pulse Height Pulse Oximetry BMI (Body Mass Index)          122 1.562 m (5' 1.5\") 98% 27.13 kg/m2         Blood Pressure from Last 3 Encounters:   07/24/18 (!) 164/107   05/18/18 118/70   01/02/18 (!) 154/98    Weight from Last 3 Encounters:   07/24/18 66.2 kg (145 lb 15.1 oz)   05/18/18 62.7 kg (138 lb 3.2 oz)   12/08/17 64.5 kg (142 lb 1.6 oz)              We Performed the Following     Hemoglobin A1c POCT          Today's Medication Changes          These changes are accurate as of 7/24/18 10:40 AM.  If you have any questions, ask your nurse or doctor.               Start taking these medicines.        Dose/Directions    lisinopril 5 MG tablet   Commonly known as:  PRINIVIL/ZESTRIL   Used for:  Type 1 diabetes mellitus not at goal (H), Benign essential hypertension   Started by:  Eda De Dios MD        Dose:  5 mg   Take 1 tablet (5 mg) by mouth daily   Quantity:  90 tablet   Refills:  3            Where to get your medicines      These medications were sent to [facility] [address] [place] [address] [facility]  [address] [facility] [address]     Phone:  [phone]     lisinopril 5 MG tablet          "       Primary Care Provider Office Phone # Fax #    Stacey Sandoval PA-C [phone] [address] [place] [address]        Equal Access to Services     MIL CARPIO : Yanely arun orellana farao Soquinali, waaxda luqadaha, qaybta kaalmada adelacho, juanito cortezmaribeth yamilet. So [facility] [phone].    ATENCIÓN: Si habla español, tiene a theodore disposición servicios gratuitos de asistencia lingüística. Llame al [phone].    We comply with applicable federal civil rights laws and [place] laws. We do not discriminate on the basis of race, color, national origin, age, disability, sex, sexual orientation, or gender identity.            Thank you!     Thank you for choosing [facility]  for your care. Our goal is always to provide you with excellent care. Hearing back from our patients is one way we can continue to improve our services. Please take a few minutes to complete the written survey that you may receive in the mail after your visit with us. Thank you!             Your Updated Medication List - Protect others around you: Learn how to safely use, store and throw away your medicines at www.disposemymeds.org.          This list is accurate as of 7/24/18 10:40 AM.  Always use your most recent med list.                   Brand Name Dispense Instructions for use Diagnosis    blood glucose monitoring meter device kit    no brand specified    1 kit    1 kit 3 times daily.    Type 1 diabetes, HbA1c goal < 7% (H)       blood glucose monitoring test strip    RACHEL CONTOUR NEXT    100 strip    Use to test blood sugar 4 times daily or as directed.    Type 1 diabetes mellitus, uncontrolled (H)       Blood Pressure Kit     1 kit    1 kit daily    Secondary hypertension, unspecified       insulin lispro 100 UNIT/ML injection    humaLOG    8 vial    Uses up to 75 units per day in insulin pump for basal, bolus, and priming of insulin pump tubing.    Type 1 diabetes mellitus not  at goal (H)       insulin pen needle 31G X 8 MM    B-D U/F    200 each    Use 6 daily or as directed.    Type 1 diabetes, HbA1c goal < 7% (H)       lisinopril 5 MG tablet    PRINIVIL/ZESTRIL    90 tablet    Take 1 tablet (5 mg) by mouth daily    Type 1 diabetes mellitus not at goal (H), Benign essential hypertension       medroxyPROGESTERone 150 MG/ML injection    DEPO-PROVERA    3 mL    Inject 1 mL (150 mg) into the muscle every 3 months    Encounter for surveillance of injectable contraceptive

## 2018-07-24 NOTE — LETTER
7/24/2018         RE: Feliciano Arreguin  [address]  [facility] [address]        Dear Colleague,    Thank you for referring your patient, Feliciano Arreguin, to the [facility]. Please see a copy of my visit note below.      The patient is seen in f/up for evaluation of diabetes. She was last seen in the clinic in 1/2018.    Feliciano Arreguin is a 23 year old female diagnosed with type 1 diabetes in 2008, when she was found to be in DKA. Hemoglobin A1c used to be between 9.3 and 10.9 and it significantly since 2016, to 8-8.5%. It was 7.4% today.      Her diabetes is known to be complicated by intermittent microalbuminuria.  She was started on a DEXCOM sensor in the beginning of 2017.     In a regular day she has 2 meals (she skips the lunch and has a snack instead) and 2 snacks (around lunchtime and at bedtime).      Insulin pump (Minimed 530G - 2015) settings:  Basal rate at 12 midnight 1.4 units per hour, at 1 PM 1.55 units per hour, at 6 PM 1.45 units per hour.  Insulin to carb ratio 1 U per 6 grams  Sensitivity 30   Target 90 - 120   Active insulin time 3 hrs     Dexcom G5 settings:  Low glucose alert 70  High glucose allergic to 50  Predictive alert for high and lows 15 minutes  Urgent low and urgent low repeat on at 55 and 30    Insulin pump settings reveal she checks her blood glucose 5.4 times daily.  On the meter, the average blood glucose is 222 with a standard deviation of 63.  Total daily insulin dose is 57 units, of which 60% is basal insulin.  Her morning blood sugar numbers are mostly in the target range.  For breakfast, she just has yogurt and coffee with sugar.  Her post breakfast blood sugar is consistently elevated and this is confirmed by the sensor records.  On the sensor, the average blood glucose is 173 with a standard deviation of 60.  48% of the blood sugar numbers are within the target of .  At night, the blood sugar numbers are quite unpredictable, from hypoglycemic  episodes to hyperglycemia.  The patient denies eating during the night.  Approximately 2 days a week, she does not enter any carbohydrate intake.  Most of the meals are covered with manual boluses, sometimes multiple over fairly short periods of time, with dosages anywhere between 1 and 4 units.  The patient admits she has been trying to bring the sensor glucose down by using the manual boluses.    Recently, she started kickboxing, every other day, for 30 minutes, in the evening.  She is not sure whether or not the blood sugar tends to run lower the evenings/nights preceded by physical exercise.    Diabetes complications:  Retinopathy: last eye exam 9/2017; mild DR   Nephropathy: h/o proteinuria dating back to 2010  Neuropathy: no numbness or tingling sensation   Feels shaky, sweaty and dizzy when BG drops in the 90s. Lowest BG she remembers was 35. She denies prior episodes of loss of consciousness due to hypoglycemia.  She has glucagon at home and her parents know how to administer it.  She had one episode of diabete s ketoacidosis in 1999.    Past Medical History:   Diagnosis Date     Diabetes mellitus (H) 1998    Type 1     Diabetic eye exam (H) 08/09/2013    [facility]   Treated with Depo-Provera since, approximately, 2014    Past Surgical History:   Procedure Laterality Date     EYE SURGERY      lazy eye correction (strabismus?)     Current Medications   On depoprovera since 2014 (pt preferred not to have MP)   Prescription Medications as of 7/24/2018             blood glucose monitoring ([employer] CONTOUR NEXT) test strip Use to test blood sugar 4 times daily or as directed.    Blood Glucose Monitoring Suppl (BLOOD GLUCOSE METER) KIT 1 kit 3 times daily.    Blood Pressure KIT 1 kit daily    insulin lispro (HUMALOG) 100 UNIT/ML injection Uses up to 75 units per day in insulin pump for basal, bolus, and priming of insulin pump tubing.    insulin pen needle (B-D U/F) 31G X 8 MM Use 6 daily or as directed.  "   medroxyPROGESTERone (DEPO-PROVERA) 150 MG/ML injection Inject 1 mL (150 mg) into the muscle every 3 months        Family History   Mother has hypothyroidism. Father has hypercholesterolemia. Grandfather melanoma. No known family history of hypertension, death at a young age, stroke.     Social History  Single. No children. She denies smoking, drinking alcohol or using illicit drugs. Occupation: .     Review of Systems   Systemic:               No fatigue   Eye:                       No eye symptoms   Anmol-Laryngeal:      No anmol-laryngeal symptoms, no dysphagia, no voice hoarseness, no cough      Breast:                   No breast symptoms  Cardiovascular:     No cardiovascular symptoms, no CP or palpitations   Pulmonary:            No pulmonary symptoms, no cough or SOB    Gastrointestinal:    No gastrointestinal symptoms, no diarrhea or constipation   Genitourinary:        No genitourinary symptoms    Endocrine:             No endocrine symptoms, no heat or cold intolerance, no increased sweating, no easy bruising      Neurological:         Longstanding headaches - less frequent, no tremor, no dizziness     Musculoskeletal:   Intermittent lower back pain   Skin:                       No skin symptoms   Psychological:        Gets anxious easily           Vital Signs     Previous Weights:    Wt Readings from Last 10 Encounters:   07/24/18 66.2 kg (145 lb 15.1 oz)   05/18/18 62.7 kg (138 lb 3.2 oz)   12/08/17 64.5 kg (142 lb 1.6 oz)   11/07/17 64.6 kg (142 lb 8 oz)   09/21/17 63 kg (139 lb)   04/04/17 62.6 kg (138 lb)   03/13/17 (P) 63.5 kg (140 lb)   10/14/16 61.2 kg (135 lb)   07/26/16 61.3 kg (135 lb 1.6 oz)   04/05/16 62 kg (136 lb 9.6 oz)     Vital Signs:  BP (!) 164/107  Pulse 122  Ht 1.562 m (5' 1.5\")  Wt 66.2 kg (145 lb 15.1 oz)  SpO2 98%  BMI 27.13 kg/m2  Blood pressure on recheck     Physical Exam  General Appearance:  she is well developed, well nourished and in no distress     Eyes:  " conjutivae and extra-ocular motions are normal.                                    pupils round and reactive to light, no lid lag, no stare    HEENT:   oropharynx clear and moist, no JVD, no bruits      no thyromegaly, no palpable nodules   Gastrointestinal: abdomen soft, non-tender, non-distended, normal bowel sounds,   no organomegaly   Musculoskeletal: normal tone and strength  Psychiatric: affect and judgment normal  Skin: warm, no lesions   Neurological: reflexes normal and symmetric, no resting tremor     Lab Results  I reviewed prior lab results documented in Epic.  Lab Results   Component Value Date    A1C 7.4 07/24/2018    A1C 7.8 01/02/2018    A1C 8.1 07/26/2016    A1C 8.6 (A) 04/05/2016    A1C 8.5 (H) 01/05/2016       Hemoglobin   Date Value Ref Range Status   09/22/2008 16.0 (H) 11.7 - 15.7 g/dL Final     Hematocrit   Date Value Ref Range Status   05/30/2018 45.0 35.0 - 47.0 % Final     Cholesterol   Date Value Ref Range Status   05/30/2018 190 <200 mg/dL Final     Cholesterol/HDL Ratio   Date Value Ref Range Status   01/03/2013 5.0 0.0 - 5.0 Final     HDL Cholesterol   Date Value Ref Range Status   05/30/2018 65 >49 mg/dL Final     LDL Cholesterol Calculated   Date Value Ref Range Status   05/30/2018 110 (H) <100 mg/dL Final     Comment:     Above desirable:  100-129 mg/dl  Borderline High:  130-159 mg/dL  High:             160-189 mg/dL  Very high:       >189 mg/dl       VLDL-Cholesterol   Date Value Ref Range Status   01/03/2013 31 (H) 0 - 30 mg/dL Final     Triglycerides   Date Value Ref Range Status   05/30/2018 75 <150 mg/dL Final     Albumin Urine mg/L   Date Value Ref Range Status   05/30/2018 29 mg/L Final     TSH   Date Value Ref Range Status   05/30/2018 2.04 0.40 - 4.00 mU/L Final         Last Basic Metabolic Panel:    Sodium   Date Value Ref Range Status   05/30/2018 135 133 - 144 mmol/L Final     Potassium   Date Value Ref Range Status   05/30/2018 4.4 3.4 - 5.3 mmol/L Final     Chloride    Date Value Ref Range Status   05/30/2018 101 94 - 109 mmol/L Final     Calcium   Date Value Ref Range Status   05/30/2018 8.8 8.5 - 10.1 mg/dL Final     Carbon Dioxide   Date Value Ref Range Status   05/30/2018 24 20 - 32 mmol/L Final     Urea Nitrogen   Date Value Ref Range Status   05/30/2018 10 7 - 30 mg/dL Final     Creatinine   Date Value Ref Range Status   05/30/2018 0.68 0.52 - 1.04 mg/dL Final     GFR Estimate   Date Value Ref Range Status   05/30/2018 >90 >60 mL/min/1.7m2 Final     Comment:     Non  GFR Calc     Glucose   Date Value Ref Range Status   05/30/2018 178 (H) 70 - 99 mg/dL Final       AST   Date Value Ref Range Status   05/30/2018 20 0 - 45 U/L Final     ALT   Date Value Ref Range Status   05/30/2018 35 0 - 50 U/L Final     Albumin   Date Value Ref Range Status   05/30/2018 4.1 3.4 - 5.0 g/dL Final       Assessment     1. Type 1 diabetes, not at goal, complicated by mild diabetic retinopathy and intermittent microalbuminuria.   Overall, the glycemic control has improved, as well as the degree of microalbuminuria.  I suspect some of the nocturnal hypoglycemic episodes documented by the sensor are a consequence of the evening exercise.    Recommendations:  Try to use the bolus wizard for all meals and snacks  Avoid repeated manual boluses as they can result in insulin stocking and hypoglycemia.  The days when she is kickboxing in the evening, she should have a 15 g carbohydrate snack at bedtime, with no insulin coverage  Ideally, she should bolus 10-15 minutes prior to food intake, in order to prevent postprandial glucose spikes and avoid the use of correction boluses  If she is not sure she is going to complete the meal, she should divide the amount of carbohydrates and bolus twice    2.  Hypertension     Quite frequently, her blood pressure is elevated in the clinic.  I suspect there is a component of anxiety.  However, given her history of microalbuminuria, I recommended to  start a small dose of lisinopril, 5 mg daily.  Counseled on side effects, including dizziness, allergic reactions, dry cough, hyperkalemia.    No orders of the defined types were placed in this encounter.               Again, thank you for allowing me to participate in the care of your patient.        Sincerely,        Eda De Dios MD

## 2018-07-24 NOTE — PROGRESS NOTES
The patient is seen in f/up for evaluation of diabetes. She was last seen in the clinic in 1/2018.    Feliciano Arreguin is a 23 year old female diagnosed with type 1 diabetes in 2008, when she was found to be in DKA. Hemoglobin A1c used to be between 9.3 and 10.9 and it significantly since 2016, to 8-8.5%. It was 7.4% today.      Her diabetes is known to be complicated by intermittent microalbuminuria.  She was started on a DEXCOM sensor in the beginning of 2017.     In a regular day she has 2 meals (she skips the lunch and has a snack instead) and 2 snacks (around lunchtime and at bedtime).      Insulin pump (Minimed 530G - 2015) settings:  Basal rate at 12 midnight 1.4 units per hour, at 1 PM 1.55 units per hour, at 6 PM 1.45 units per hour.  Insulin to carb ratio 1 U per 6 grams  Sensitivity 30   Target 90 - 120   Active insulin time 3 hrs     Dexcom G5 settings:  Low glucose alert 70  High glucose allergic to 50  Predictive alert for high and lows 15 minutes  Urgent low and urgent low repeat on at 55 and 30    Insulin pump settings reveal she checks her blood glucose 5.4 times daily.  On the meter, the average blood glucose is 222 with a standard deviation of 63.  Total daily insulin dose is 57 units, of which 60% is basal insulin.  Her morning blood sugar numbers are mostly in the target range.  For breakfast, she just has yogurt and coffee with sugar.  Her post breakfast blood sugar is consistently elevated and this is confirmed by the sensor records.  On the sensor, the average blood glucose is 173 with a standard deviation of 60.  48% of the blood sugar numbers are within the target of .  At night, the blood sugar numbers are quite unpredictable, from hypoglycemic episodes to hyperglycemia.  The patient denies eating during the night.  Approximately 2 days a week, she does not enter any carbohydrate intake.  Most of the meals are covered with manual boluses, sometimes multiple over fairly short periods  of time, with dosages anywhere between 1 and 4 units.  The patient admits she has been trying to bring the sensor glucose down by using the manual boluses.    Recently, she started kickboxing, every other day, for 30 minutes, in the evening.  She is not sure whether or not the blood sugar tends to run lower the evenings/nights preceded by physical exercise.    Diabetes complications:  Retinopathy: last eye exam 9/2017; mild DR   Nephropathy: h/o proteinuria dating back to 2010  Neuropathy: no numbness or tingling sensation   Feels shaky, sweaty and dizzy when BG drops in the 90s. Lowest BG she remembers was 35. She denies prior episodes of loss of consciousness due to hypoglycemia.  She has glucagon at home and her parents know how to administer it.  She had one episode of diabete s ketoacidosis in 1999.    Past Medical History:   Diagnosis Date     Diabetes mellitus (H) 1998    Type 1     Diabetic eye exam (H) 08/09/2013    [facility]   Treated with Depo-Provera since, approximately, 2014    Past Surgical History:   Procedure Laterality Date     EYE SURGERY      lazy eye correction (strabismus?)     Current Medications   On depoprovera since 2014 (pt preferred not to have MP)   Prescription Medications as of 7/24/2018             blood glucose monitoring ([employer] CONTOUR NEXT) test strip Use to test blood sugar 4 times daily or as directed.    Blood Glucose Monitoring Suppl (BLOOD GLUCOSE METER) KIT 1 kit 3 times daily.    Blood Pressure KIT 1 kit daily    insulin lispro (HUMALOG) 100 UNIT/ML injection Uses up to 75 units per day in insulin pump for basal, bolus, and priming of insulin pump tubing.    insulin pen needle (B-D U/F) 31G X 8 MM Use 6 daily or as directed.    medroxyPROGESTERone (DEPO-PROVERA) 150 MG/ML injection Inject 1 mL (150 mg) into the muscle every 3 months        Family History   Mother has hypothyroidism. Father has hypercholesterolemia. Grandfather melanoma. No known family history of  "hypertension, death at a young age, stroke.     Social History  Single. No children. She denies smoking, drinking alcohol or using illicit drugs. Occupation: .     Review of Systems   Systemic:               No fatigue   Eye:                       No eye symptoms   Anmol-Laryngeal:      No anmol-laryngeal symptoms, no dysphagia, no voice hoarseness, no cough      Breast:                   No breast symptoms  Cardiovascular:     No cardiovascular symptoms, no CP or palpitations   Pulmonary:            No pulmonary symptoms, no cough or SOB    Gastrointestinal:    No gastrointestinal symptoms, no diarrhea or constipation   Genitourinary:        No genitourinary symptoms    Endocrine:             No endocrine symptoms, no heat or cold intolerance, no increased sweating, no easy bruising      Neurological:         Longstanding headaches - less frequent, no tremor, no dizziness     Musculoskeletal:   Intermittent lower back pain   Skin:                       No skin symptoms   Psychological:        Gets anxious easily           Vital Signs     Previous Weights:    Wt Readings from Last 10 Encounters:   07/24/18 66.2 kg (145 lb 15.1 oz)   05/18/18 62.7 kg (138 lb 3.2 oz)   12/08/17 64.5 kg (142 lb 1.6 oz)   11/07/17 64.6 kg (142 lb 8 oz)   09/21/17 63 kg (139 lb)   04/04/17 62.6 kg (138 lb)   03/13/17 (P) 63.5 kg (140 lb)   10/14/16 61.2 kg (135 lb)   07/26/16 61.3 kg (135 lb 1.6 oz)   04/05/16 62 kg (136 lb 9.6 oz)     Vital Signs:  BP (!) 164/107  Pulse 122  Ht 1.562 m (5' 1.5\")  Wt 66.2 kg (145 lb 15.1 oz)  SpO2 98%  BMI 27.13 kg/m2  Blood pressure on recheck     Physical Exam  General Appearance:  she is well developed, well nourished and in no distress     Eyes:  conjutivae and extra-ocular motions are normal.                                    pupils round and reactive to light, no lid lag, no stare    HEENT:   oropharynx clear and moist, no JVD, no bruits      no thyromegaly, no palpable nodules "   Gastrointestinal: abdomen soft, non-tender, non-distended, normal bowel sounds,   no organomegaly   Musculoskeletal: normal tone and strength  Psychiatric: affect and judgment normal  Skin: warm, no lesions   Neurological: reflexes normal and symmetric, no resting tremor     Lab Results  I reviewed prior lab results documented in Epic.  Lab Results   Component Value Date    A1C 7.4 07/24/2018    A1C 7.8 01/02/2018    A1C 8.1 07/26/2016    A1C 8.6 (A) 04/05/2016    A1C 8.5 (H) 01/05/2016       Hemoglobin   Date Value Ref Range Status   09/22/2008 16.0 (H) 11.7 - 15.7 g/dL Final     Hematocrit   Date Value Ref Range Status   05/30/2018 45.0 35.0 - 47.0 % Final     Cholesterol   Date Value Ref Range Status   05/30/2018 190 <200 mg/dL Final     Cholesterol/HDL Ratio   Date Value Ref Range Status   01/03/2013 5.0 0.0 - 5.0 Final     HDL Cholesterol   Date Value Ref Range Status   05/30/2018 65 >49 mg/dL Final     LDL Cholesterol Calculated   Date Value Ref Range Status   05/30/2018 110 (H) <100 mg/dL Final     Comment:     Above desirable:  100-129 mg/dl  Borderline High:  130-159 mg/dL  High:             160-189 mg/dL  Very high:       >189 mg/dl       VLDL-Cholesterol   Date Value Ref Range Status   01/03/2013 31 (H) 0 - 30 mg/dL Final     Triglycerides   Date Value Ref Range Status   05/30/2018 75 <150 mg/dL Final     Albumin Urine mg/L   Date Value Ref Range Status   05/30/2018 29 mg/L Final     TSH   Date Value Ref Range Status   05/30/2018 2.04 0.40 - 4.00 mU/L Final         Last Basic Metabolic Panel:    Sodium   Date Value Ref Range Status   05/30/2018 135 133 - 144 mmol/L Final     Potassium   Date Value Ref Range Status   05/30/2018 4.4 3.4 - 5.3 mmol/L Final     Chloride   Date Value Ref Range Status   05/30/2018 101 94 - 109 mmol/L Final     Calcium   Date Value Ref Range Status   05/30/2018 8.8 8.5 - 10.1 mg/dL Final     Carbon Dioxide   Date Value Ref Range Status   05/30/2018 24 20 - 32 mmol/L Final      Urea Nitrogen   Date Value Ref Range Status   05/30/2018 10 7 - 30 mg/dL Final     Creatinine   Date Value Ref Range Status   05/30/2018 0.68 0.52 - 1.04 mg/dL Final     GFR Estimate   Date Value Ref Range Status   05/30/2018 >90 >60 mL/min/1.7m2 Final     Comment:     Non  GFR Calc     Glucose   Date Value Ref Range Status   05/30/2018 178 (H) 70 - 99 mg/dL Final       AST   Date Value Ref Range Status   05/30/2018 20 0 - 45 U/L Final     ALT   Date Value Ref Range Status   05/30/2018 35 0 - 50 U/L Final     Albumin   Date Value Ref Range Status   05/30/2018 4.1 3.4 - 5.0 g/dL Final       Assessment     1. Type 1 diabetes, not at goal, complicated by mild diabetic retinopathy and intermittent microalbuminuria.   Overall, the glycemic control has improved, as well as the degree of microalbuminuria.  I suspect some of the nocturnal hypoglycemic episodes documented by the sensor are a consequence of the evening exercise.    Recommendations:  Try to use the bolus wizard for all meals and snacks  Avoid repeated manual boluses as they can result in insulin stocking and hypoglycemia.  The days when she is kickboxing in the evening, she should have a 15 g carbohydrate snack at bedtime, with no insulin coverage  Ideally, she should bolus 10-15 minutes prior to food intake, in order to prevent postprandial glucose spikes and avoid the use of correction boluses  If she is not sure she is going to complete the meal, she should divide the amount of carbohydrates and bolus twice    2.  Hypertension     Quite frequently, her blood pressure is elevated in the clinic.  I suspect there is a component of anxiety.  However, given her history of microalbuminuria, I recommended to start a small dose of lisinopril, 5 mg daily.  Counseled on side effects, including dizziness, allergic reactions, dry cough, hyperkalemia.    No orders of the defined types were placed in this encounter.

## 2018-07-24 NOTE — PATIENT INSTRUCTIONS
The days when you are boxing in the evening, have a 15 grams carbs snack at bedtime, with no insulin coverage.   Try to bolus 10-15 minutes prior to food intake   If not sure you are going to complete the meal, divide the amount and bolus twice   Avoid using the manual bolus and use the wizard for all meals and snacks           Sending blood sugars to your provider at [facility]:  We want to help you with your diabetes management, which often requires frequent adjustments to your therapy. For your convenience, we have several ways to send your blood sugars to your doctor for review.    - Send message directly to your doctor through My Chart.  Please ask the rooming staff if you would like to sign up for My Chart.  This is a fast and confidential way to send your information and communicate directly with your provider.   - Record readings and fax to [phone].  We have a template for you to use for your convenience.  - Stop by the clinic with your meter for download if advised by provider.   - My Chart or call Anna Black, Diabetes Educator at [phone]  - Call the clinic and speak to one of the endocrine nurses to relay information on the telephone.  Mireya Nava,at [phone].   -    Please call the on-call Endocrinologist at the [facility] for after       hours/weekend needs at [phone] Option #4.    Please note that you do not need to FAST if you are just having an A1C drawn. Please remember to ALWAYS bring your glucose meter with to your appointment. This data is very important for the management of your care.    Thank you!  Your [facility]

## 2018-08-06 ENCOUNTER — OFFICE VISIT (OUTPATIENT)
Dept: PSYCHOLOGY | Facility: CLINIC | Age: 23
End: 2018-08-06
Payer: COMMERCIAL

## 2018-08-06 DIAGNOSIS — F43.10 PTSD (POST-TRAUMATIC STRESS DISORDER): Primary | ICD-10-CM

## 2018-08-06 PROCEDURE — 90791 PSYCH DIAGNOSTIC EVALUATION: CPT | Performed by: COUNSELOR

## 2018-08-06 ASSESSMENT — PATIENT HEALTH QUESTIONNAIRE - PHQ9: 5. POOR APPETITE OR OVEREATING: NEARLY EVERY DAY

## 2018-08-06 ASSESSMENT — ANXIETY QUESTIONNAIRES
2. NOT BEING ABLE TO STOP OR CONTROL WORRYING: NEARLY EVERY DAY
IF YOU CHECKED OFF ANY PROBLEMS ON THIS QUESTIONNAIRE, HOW DIFFICULT HAVE THESE PROBLEMS MADE IT FOR YOU TO DO YOUR WORK, TAKE CARE OF THINGS AT HOME, OR GET ALONG WITH OTHER PEOPLE: VERY DIFFICULT
6. BECOMING EASILY ANNOYED OR IRRITABLE: NEARLY EVERY DAY
3. WORRYING TOO MUCH ABOUT DIFFERENT THINGS: NEARLY EVERY DAY
GAD7 TOTAL SCORE: 19
5. BEING SO RESTLESS THAT IT IS HARD TO SIT STILL: SEVERAL DAYS
1. FEELING NERVOUS, ANXIOUS, OR ON EDGE: NEARLY EVERY DAY
7. FEELING AFRAID AS IF SOMETHING AWFUL MIGHT HAPPEN: NEARLY EVERY DAY

## 2018-08-06 NOTE — Clinical Note
Zana Ibarra,  I got to see your patient Feliciano today to address some ptsd symptoms. I don't think she needs any anxiety medications at this point.  Thanks! Staci Kramer MA, [facility]

## 2018-08-06 NOTE — MR AVS SNAPSHOT
MRN:[id number]                      After Visit Summary   8/6/2018    Feliciano Arreguin    MRN: [id number]           Visit Information        Provider Department      8/6/2018 9:30 AM Staci Kramer [facility] Generic      Your next 10 appointments already scheduled     Sep 07, 2018  8:00 AM CDT   Return Visit with Staci Kramer [facility] ([facility])    [address]  [place] [address]   [phone]            Sep 14, 2018 10:00 AM CDT   Return Visit with Staci Kramer [facility] ([facility])    [address]  [place] [address]   [phone]            Sep 18, 2018 12:00 PM CDT   Telephone Visit with dEa De Dios MD   [facility])    [address] [place] [address]   [phone]           Note: this is not an onsite visit; there is no need to come to the facility.            Sep 28, 2018 10:00 AM CDT   Return Visit with Staci Kramer [facility] ([facility])    [address]  [place] [address]   [phone]            Jan 29, 2019  8:45 AM CST   Return Visit with Eda De Dios MD, MG ENDO NURSE   [facility])    [address] [place] [address]   [phone]              MyChart Information     [employer] gives you secure access to your electronic health record. If you see a primary care provider, you can also send messages to your care team and make appointments. If you have questions, please call your primary care clinic.  If you do not have a primary care provider, please call [phone] and they will assist you.        Care EveryWhere ID     This is your Care EveryWhere ID. This could be used by other organizations  to access your [facility] medical records  [id number]        Equal Access to Services     MIL CARPIO : Yanely Little, pantera mancia, chao cornejo, juanito woodard. So [facility] [phone].    ATENCIÓN: Si habla español, tiene a theodore disposición servicios gratuitos de asistencia lingüística. Llame al [phone].    We comply with applicable federal civil rights laws and [place] laws. We do not discriminate on the basis of race, color, national origin, age, disability, sex, sexual orientation, or gender identity.
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
## 2018-08-07 ASSESSMENT — PATIENT HEALTH QUESTIONNAIRE - PHQ9: SUM OF ALL RESPONSES TO PHQ QUESTIONS 1-9: 17

## 2018-08-07 ASSESSMENT — ANXIETY QUESTIONNAIRES: GAD7 TOTAL SCORE: 19

## 2018-08-23 ENCOUNTER — ALLIED HEALTH/NURSE VISIT (OUTPATIENT)
Dept: FAMILY MEDICINE | Facility: OTHER | Age: 23
End: 2018-08-23
Payer: COMMERCIAL

## 2018-08-23 VITALS — HEART RATE: 78 BPM | SYSTOLIC BLOOD PRESSURE: 122 MMHG | DIASTOLIC BLOOD PRESSURE: 76 MMHG

## 2018-08-23 PROCEDURE — 96372 THER/PROPH/DIAG INJ SC/IM: CPT

## 2018-08-23 PROCEDURE — 99207 ZZC NO CHARGE NURSE ONLY: CPT

## 2018-08-23 NOTE — PROGRESS NOTES
Prior to injection verified patient identity using patient's name and date of birth.  Due to injection administration, patient instructed to remain in clinic for 15 minutes  afterwards, and to report any adverse reaction to me immediately.    I let pt know that she will need a physical before her next injection so that she can get a refill on this. She understands and I wrote this on her card with her due date for next injection.

## 2018-08-23 NOTE — MR AVS SNAPSHOT
After Visit Summary   8/23/2018    Feliciano Arreguin    MRN: [id number]           Patient Information     Date Of Birth          1995        Visit Information        Provider Department      8/23/2018 1:30 PM NL FLOAT NURSE [facility]        Today's Diagnoses     Contraception    -  1       Follow-ups after your visit        Your next 10 appointments already scheduled     Sep 07, 2018  8:00 AM CDT   Return Visit with Staci Kramer [facility] ([facility])    [address]  [facility] [address]   [phone]            Sep 14, 2018 10:00 AM CDT   Return Visit with Staci Kramer [facility] ([facility])    [address]  [facility] [address] [phone]            Sep 18, 2018 12:00 PM CDT   Telephone Visit with Eda De Dios MD   [facility])    [address] [place] [id number] [phone]           Note: this is not an onsite visit; there is no need to come to the facility.            Sep 28, 2018 10:00 AM CDT   Return Visit with Staci Kramer [facility] ([facility])    [address]  [facility] [address] [phone]            Jan 29, 2019  8:45 AM CST   Return Visit with Eda De Dios MD, MG ENDO NURSE   [facility])    [address] [place] [id number] [phone]              Who to contact     If you have questions or need follow up information about today's clinic visit or your schedule please contact [facility] directly at [phone].  Normal or non-critical lab and imaging results will be communicated to you by MyChart, letter or phone within 4 business days after the clinic has received the results. If you do not hear from  us within 7 days, please contact the clinic through Atrica or phone. If you have a critical or abnormal lab result, we will notify you by phone as soon as possible.  Submit refill requests through Atrica or call your pharmacy and they will forward the refill request to us. Please allow 3 business days for your refill to be completed.          Additional Information About Your Visit        City Labshart Information     Atrica gives you secure access to your electronic health record. If you see a primary care provider, you can also send messages to your care team and make appointments. If you have questions, please call your primary care clinic.  If you do not have a primary care provider, please call [phone] and they will assist you.        Care EveryWhere ID     This is your Care EveryWhere ID. This could be used by other organizations to access your [place] medical records  [id number]        Your Vitals Were     Pulse                   78            Blood Pressure from Last 3 Encounters:   08/23/18 122/76   07/24/18 (!) 164/107   05/18/18 118/70    Weight from Last 3 Encounters:   07/24/18 145 lb 15.1 oz (66.2 kg)   05/18/18 138 lb 3.2 oz (62.7 kg)   12/08/17 142 lb 1.6 oz (64.5 kg)              We Performed the Following     INJECTION INTRAMUSCULAR OR SUB-Q     Medroxyprogesterone inj/1mg (Depo Provera J-Code)        Primary Care Provider Office Phone # Fax #    Stacey Sandoval PA-C [phone] [address] [place] [address]        Equal Access to Services     [facility]: Hadii aad ku hadasho Soomaali, waaxda luqadaha, qaybta kaalmada adeegyada, juanito pierre . So [facility] [phone].    ATENCIÓN: Si habla español, tiene a theodore disposición servicios gratuitos de asistencia lingüística. Llame al [phone].    We comply with applicable federal civil rights laws and [place] laws. We do not discriminate on the basis of race, color, national origin, age,  disability, sex, sexual orientation, or gender identity.            Thank you!     Thank you for choosing [facility]  for your care. Our goal is always to provide you with excellent care. Hearing back from our patients is one way we can continue to improve our services. Please take a few minutes to complete the written survey that you may receive in the mail after your visit with us. Thank you!             Your Updated Medication List - Protect others around you: Learn how to safely use, store and throw away your medicines at www.disposemymeds.org.          This list is accurate as of 8/23/18  1:42 PM.  Always use your most recent med list.                   Brand Name Dispense Instructions for use Diagnosis    blood glucose monitoring meter device kit    no brand specified    1 kit    1 kit 3 times daily.    Type 1 diabetes, HbA1c goal < 7% (H)       blood glucose monitoring test strip    RACHEL CONTOUR NEXT    100 strip    Use to test blood sugar 4 times daily or as directed.    Type 1 diabetes mellitus, uncontrolled (H)       Blood Pressure Kit     1 kit    1 kit daily    Secondary hypertension, unspecified       insulin lispro 100 UNIT/ML injection    humaLOG    8 vial    Uses up to 75 units per day in insulin pump for basal, bolus, and priming of insulin pump tubing.    Type 1 diabetes mellitus not at goal (H)       insulin pen needle 31G X 8 MM    B-D U/F    200 each    Use 6 daily or as directed.    Type 1 diabetes, HbA1c goal < 7% (H)       lisinopril 5 MG tablet    PRINIVIL/ZESTRIL    90 tablet    Take 1 tablet (5 mg) by mouth daily    Type 1 diabetes mellitus not at goal (H), Benign essential hypertension       medroxyPROGESTERone 150 MG/ML injection    DEPO-PROVERA    3 mL    Inject 1 mL (150 mg) into the muscle every 3 months    Encounter for surveillance of injectable contraceptive

## 2018-08-29 ENCOUNTER — TELEPHONE (OUTPATIENT)
Dept: FAMILY MEDICINE | Facility: OTHER | Age: 23
End: 2018-08-29

## 2018-08-29 NOTE — TELEPHONE ENCOUNTER
Reviewed her blood pressures and last BP when she received Depo injection was at goal at 122/76.  BARBER JoynerC

## 2018-10-17 ENCOUNTER — ALLIED HEALTH/NURSE VISIT (OUTPATIENT)
Dept: FAMILY MEDICINE | Facility: OTHER | Age: 23
End: 2018-10-17
Payer: COMMERCIAL

## 2018-10-17 DIAGNOSIS — Z11.1 SCREENING EXAMINATION FOR PULMONARY TUBERCULOSIS: Primary | ICD-10-CM

## 2018-10-17 PROCEDURE — 99207 ZZC NO CHARGE NURSE ONLY: CPT

## 2018-10-17 PROCEDURE — 86580 TB INTRADERMAL TEST: CPT

## 2018-10-17 NOTE — NURSING NOTE
The patient is asked the following questions today and these are her answers:    -Have you had a mantoux administered in the past 30 days?    No  -Have you had a previous positive Mantoux.  No  -Have you received BCG in the past.  No  -Have you had a live vaccine  (MMR, Varicella, OPV, Yellow Fever) in the last 6 weeks.  No  -Have you had and active  viral or bacterial infection in the past 6 weeks.  No  -Have you received corticosteroids or immunosuppressive agents in the past 6 weeks.  No  -Have you been diagnosed with HIV?  No  -Do you have a maglinancy?  No   Myra Bruno CMA (AAMA)

## 2018-10-17 NOTE — MR AVS SNAPSHOT
After Visit Summary   10/17/2018    Feliciano Arreguin    MRN: [id number]           Patient Information     Date Of Birth          1995        Visit Information        Provider Department      10/17/2018 2:30 PM NL FLOAT NURSE [facility]        Today's Diagnoses     Screening examination for pulmonary tuberculosis    -  1       Follow-ups after your visit        Your next 10 appointments already scheduled     Oct 19, 2018  2:30 PM CDT   Nurse Only with NL RN [facility] ([facility])    [address] [facility] [address] [phone]            Jan 29, 2019  8:45 AM CST   Return Visit with Eda De Dios MD, MG ENDO NURSE   [facility] ([facility])    [address] [place] [address] [phone]              Who to contact     If you have questions or need follow up information about today's clinic visit or your schedule please contact [facility] directly at [phone].  Normal or non-critical lab and imaging results will be communicated to you by HealthLinkNowhart, letter or phone within 4 business days after the clinic has received the results. If you do not hear from us within 7 days, please contact the clinic through GO Outdoorst or phone. If you have a critical or abnormal lab result, we will notify you by phone as soon as possible.  Submit refill requests through inSelly or call your pharmacy and they will forward the refill request to us. Please allow 3 business days for your refill to be completed.          Additional Information About Your Visit        MyChart Information     inSelly gives you secure access to your electronic health record. If you see a primary care provider, you can also send messages to your care team and make appointments. If you have questions, please call your primary care clinic.  If you do not have a primary care provider, please call  [phone] and they will assist you.        Care EveryWhere ID     This is your Care EveryWhere ID. This could be used by other organizations to access your [place] medical records  [id number]         Blood Pressure from Last 3 Encounters:   08/23/18 122/76   07/24/18 (!) 164/107   05/18/18 118/70    Weight from Last 3 Encounters:   07/24/18 145 lb 15.1 oz (66.2 kg)   05/18/18 138 lb 3.2 oz (62.7 kg)   12/08/17 142 lb 1.6 oz (64.5 kg)              We Performed the Following     TB INTRADERMAL TEST        Primary Care Provider Office Phone # Fax #    Stacey [place] PA-C [phone] [address] [place] [address]        Equal Access to Services     [facility]: Hadii arun orlelana hadasho Soomaali, waaxda luqadaha, qaybta kaalmada adepreetyada, juanito pierre . So [facility] [phone].    ATENCIÓN: Si habla español, tiene a theodore disposición servicios gratuitos de asistencia lingüística. [facility] [phone].    We comply with applicable federal civil rights laws and [place] laws. We do not discriminate on the basis of race, color, national origin, age, disability, sex, sexual orientation, or gender identity.            Thank you!     Thank you for choosing [facility]  for your care. Our goal is always to provide you with excellent care. Hearing back from our patients is one way we can continue to improve our services. Please take a few minutes to complete the written survey that you may receive in the mail after your visit with us. Thank you!             Your Updated Medication List - Protect others around you: Learn how to safely use, store and throw away your medicines at www.disposemymeds.org.          This list is accurate as of 10/17/18  2:47 PM.  Always use your most recent med list.                   Brand Name Dispense Instructions for use Diagnosis    blood glucose monitoring meter device kit    no brand specified    1 kit    1 kit 3 times daily.     Type 1 diabetes, HbA1c goal < 7% (H)       blood glucose monitoring test strip    RACHEL CONTOUR NEXT    100 strip    Use to test blood sugar 4 times daily or as directed.    Type 1 diabetes mellitus, uncontrolled (H)       Blood Pressure Kit     1 kit    1 kit daily    Secondary hypertension, unspecified       insulin lispro 100 UNIT/ML injection    humaLOG    8 vial    Uses up to 75 units per day in insulin pump for basal, bolus, and priming of insulin pump tubing.    Type 1 diabetes mellitus not at goal (H)       insulin pen needle 31G X 8 MM    B-D U/F    200 each    Use 6 daily or as directed.    Type 1 diabetes, HbA1c goal < 7% (H)       lisinopril 5 MG tablet    PRINIVIL/ZESTRIL    90 tablet    Take 1 tablet (5 mg) by mouth daily    Type 1 diabetes mellitus not at goal (H), Benign essential hypertension       medroxyPROGESTERone 150 MG/ML injection    DEPO-PROVERA    3 mL    Inject 1 mL (150 mg) into the muscle every 3 months    Encounter for surveillance of injectable contraceptive

## 2018-10-19 ENCOUNTER — ALLIED HEALTH/NURSE VISIT (OUTPATIENT)
Dept: FAMILY MEDICINE | Facility: OTHER | Age: 23
End: 2018-10-19
Payer: COMMERCIAL

## 2018-10-19 DIAGNOSIS — Z11.1 SCREENING EXAMINATION FOR PULMONARY TUBERCULOSIS: Primary | ICD-10-CM

## 2018-10-19 LAB
PPDINDURATION: 0 MM (ref 0–5)
PPDREDNESS: 0 MM

## 2018-10-19 PROCEDURE — 99207 ZZC NO CHARGE NURSE ONLY: CPT

## 2018-10-19 NOTE — PROGRESS NOTES
Mantoux result:  Lab Results   Component Value Date    PPDREDNESS 0 10/19/2018    PPDINDURATIO 0 10/19/2018       Martínez Helm, RN, BSN

## 2018-10-31 ENCOUNTER — TELEPHONE (OUTPATIENT)
Dept: FAMILY MEDICINE | Facility: OTHER | Age: 23
End: 2018-10-31

## 2018-10-31 NOTE — LETTER
[facility]  [address] [facility] [address]  [phone]          10/31/2018          To Whom it May Concern:     Feliciano Arreguin, female, 1995 has had a mantoux on 10/17/2018.      Mantoux result is NEGATIVE:  Lab Results   Component Value Date    PPDREDNESS 0 10/19/2018    PPDINDURATIO 0 10/19/2018         Please contact me for questions or concerns.        Sincerely,      Stacey [place] PA-C

## 2018-10-31 NOTE — TELEPHONE ENCOUNTER
Letter created.      Patient informed she will print from [employer].    Jethro Garcia, RN, BSN

## 2018-10-31 NOTE — TELEPHONE ENCOUNTER
Reason for Call:  Other question    Detailed comments: patient is on her my chart and it states there are no results for her mantoux. Patient states she was in and had it read, patient wants to know why it is not showing her anything?    Phone Number Patient can be reached at: Home number on file [phone] (home) or Cell number on file:    Telephone Information:   Mobile [phone]       Best Time: anytime    Can we leave a detailed message on this number? YES    Call taken on 10/31/2018 at 3:48 PM by [place] Snow

## 2018-11-05 DIAGNOSIS — E10.9 TYPE 1 DIABETES MELLITUS NOT AT GOAL (H): ICD-10-CM

## 2018-11-05 NOTE — TELEPHONE ENCOUNTER
Faxed refill request received from [facility] [place] [place] [facility] [address] [facility].   Medication Requested: Humalog  Directions: uses up to 75 units per day in insulin pump  Quantity: 80  Last Office Visit: 7/24/18  Next Appointment Scheduled for: 1/29/19

## 2018-11-21 ENCOUNTER — ALLIED HEALTH/NURSE VISIT (OUTPATIENT)
Dept: FAMILY MEDICINE | Facility: OTHER | Age: 23
End: 2018-11-21
Payer: COMMERCIAL

## 2018-11-21 DIAGNOSIS — Z11.4 SCREENING FOR HIV (HUMAN IMMUNODEFICIENCY VIRUS): ICD-10-CM

## 2018-11-21 DIAGNOSIS — Z23 NEED FOR PROPHYLACTIC VACCINATION AND INOCULATION AGAINST INFLUENZA: ICD-10-CM

## 2018-11-21 DIAGNOSIS — Z23 NEED FOR PROPHYLACTIC VACCINATION AGAINST STREPTOCOCCUS PNEUMONIAE (PNEUMOCOCCUS): ICD-10-CM

## 2018-11-21 PROCEDURE — 96372 THER/PROPH/DIAG INJ SC/IM: CPT

## 2018-11-21 PROCEDURE — 99207 ZZC NO CHARGE NURSE ONLY: CPT

## 2018-11-21 NOTE — PROGRESS NOTES
Prior to injection verified patient identity using patient's name and date of birth.  Due to injection administration, patient instructed to remain in clinic for 15 minutes  afterwards, and to report any adverse reaction to me immediately.      Huddled with WS and was given the ok to give yasmani refill on this injection. Pt is due for a physical and MUST complete this with refills before next injection. Pt states she understands and will follow through.

## 2018-11-21 NOTE — MR AVS SNAPSHOT
After Visit Summary   11/21/2018    Feliciano Arreguin    MRN: [id number]           Patient Information     Date Of Birth          1995        Visit Information        Provider Department      11/21/2018 3:00 PM NL FLOAT NURSE [facility]        Today's Diagnoses     Contraception    -  1    Screening for HIV (human immunodeficiency virus)        Need for prophylactic vaccination and inoculation against influenza        Need for prophylactic vaccination against Streptococcus pneumoniae (pneumococcus)           Follow-ups after your visit        Your next 10 appointments already scheduled     Jan 29, 2019  8:45 AM CST   Return Visit with Eda De Dios MD, MG ENDO NURSE   [facility] ([facility])    [address] [place] [address] [phone]              Who to contact     If you have questions or need follow up information about today's clinic visit or your schedule please contact [facility] directly at [phone].  Normal or non-critical lab and imaging results will be communicated to you by Seeker-Industrieshart, letter or phone within 4 business days after the clinic has received the results. If you do not hear from us within 7 days, please contact the clinic through Seeker-Industrieshart or phone. If you have a critical or abnormal lab result, we will notify you by phone as soon as possible.  Submit refill requests through [employer] or call your pharmacy and they will forward the refill request to us. Please allow 3 business days for your refill to be completed.          Additional Information About Your Visit        MyChart Information     [employer] gives you secure access to your electronic health record. If you see a primary care provider, you can also send messages to your care team and make appointments. If you have questions, please call your primary care clinic.  If you do not have a primary care provider, please call  [phone] and they will assist you.        Care EveryWhere ID     This is your Care EveryWhere ID. This could be used by other organizations to access your [place] medical records  [id number]         Blood Pressure from Last 3 Encounters:   08/23/18 122/76   07/24/18 (!) 164/107   05/18/18 118/70    Weight from Last 3 Encounters:   07/24/18 145 lb 15.1 oz (66.2 kg)   05/18/18 138 lb 3.2 oz (62.7 kg)   12/08/17 142 lb 1.6 oz (64.5 kg)              We Performed the Following     INJECTION INTRAMUSCULAR OR SUB-Q     Medroxyprogesterone inj/1mg (Depo Provera J-Code)        Primary Care Provider Office Phone # Fax #    Stacey Sandoval PA-C [phone] [address] [place] [address]        Equal Access to Services     [facility]: Hadii aad ku hadasho Soomaali, waaxda luqadaha, qaybta kaalmada adeegyada, waxay ursulain hayrobn [address] . So [facility] [phone].    ATENCIÓN: Si habla español, tiene a theodore disposición servicios gratuitos de asistencia lingüística. Llame al [phone].    We comply with applicable federal civil rights laws and [place] laws. We do not discriminate on the basis of race, color, national origin, age, disability, sex, sexual orientation, or gender identity.            Thank you!     Thank you for choosing [facility]  for your care. Our goal is always to provide you with excellent care. Hearing back from our patients is one way we can continue to improve our services. Please take a few minutes to complete the written survey that you may receive in the mail after your visit with us. Thank you!             Your Updated Medication List - Protect others around you: Learn how to safely use, store and throw away your medicines at www.disposemymeds.org.          This list is accurate as of 11/21/18  4:00 PM.  Always use your most recent med list.                   Brand Name Dispense Instructions for use Diagnosis    blood glucose monitoring  meter device kit    no brand specified    1 kit    1 kit 3 times daily.    Type 1 diabetes, HbA1c goal < 7% (H)       blood glucose monitoring test strip    RACHEL CONTOUR NEXT    100 strip    Use to test blood sugar 4 times daily or as directed.    Type 1 diabetes mellitus, uncontrolled (H)       Blood Pressure Kit     1 kit    1 kit daily    Secondary hypertension, unspecified       insulin lispro 100 UNIT/ML injection    humaLOG    8 vial    Uses up to 75 units per day in insulin pump for basal, bolus, and priming of insulin pump tubing.    Type 1 diabetes mellitus not at goal (H)       insulin pen needle 31G X 8 MM miscellaneous    B-D U/F    200 each    Use 6 daily or as directed.    Type 1 diabetes, HbA1c goal < 7% (H)       lisinopril 5 MG tablet    PRINIVIL/ZESTRIL    90 tablet    Take 1 tablet (5 mg) by mouth daily    Type 1 diabetes mellitus not at goal (H), Benign essential hypertension       medroxyPROGESTERone 150 MG/ML injection    DEPO-PROVERA    3 mL    Inject 1 mL (150 mg) into the muscle every 3 months    Encounter for surveillance of injectable contraceptive

## 2018-12-28 ENCOUNTER — TRANSFERRED RECORDS (OUTPATIENT)
Dept: HEALTH INFORMATION MANAGEMENT | Facility: CLINIC | Age: 23
End: 2018-12-28

## 2018-12-28 LAB — RETINOPATHY: NEGATIVE

## 2019-02-14 ENCOUNTER — OFFICE VISIT (OUTPATIENT)
Dept: FAMILY MEDICINE | Facility: OTHER | Age: 24
End: 2019-02-14
Payer: COMMERCIAL

## 2019-02-14 VITALS
RESPIRATION RATE: 20 BRPM | TEMPERATURE: 99 F | HEIGHT: 62 IN | WEIGHT: 144.8 LBS | BODY MASS INDEX: 26.65 KG/M2 | SYSTOLIC BLOOD PRESSURE: 148 MMHG | HEART RATE: 120 BPM | DIASTOLIC BLOOD PRESSURE: 72 MMHG

## 2019-02-14 DIAGNOSIS — Z00.00 ENCOUNTER FOR ROUTINE ADULT HEALTH EXAMINATION WITHOUT ABNORMAL FINDINGS: Primary | ICD-10-CM

## 2019-02-14 DIAGNOSIS — I10 UNCONTROLLED HYPERTENSION: ICD-10-CM

## 2019-02-14 DIAGNOSIS — Z12.4 SCREENING FOR MALIGNANT NEOPLASM OF CERVIX: ICD-10-CM

## 2019-02-14 DIAGNOSIS — Z30.42 ENCOUNTER FOR SURVEILLANCE OF INJECTABLE CONTRACEPTIVE: ICD-10-CM

## 2019-02-14 PROCEDURE — 99213 OFFICE O/P EST LOW 20 MIN: CPT | Mod: 25 | Performed by: STUDENT IN AN ORGANIZED HEALTH CARE EDUCATION/TRAINING PROGRAM

## 2019-02-14 PROCEDURE — 99395 PREV VISIT EST AGE 18-39: CPT | Mod: 25 | Performed by: STUDENT IN AN ORGANIZED HEALTH CARE EDUCATION/TRAINING PROGRAM

## 2019-02-14 PROCEDURE — 96372 THER/PROPH/DIAG INJ SC/IM: CPT | Performed by: STUDENT IN AN ORGANIZED HEALTH CARE EDUCATION/TRAINING PROGRAM

## 2019-02-14 RX ORDER — MEDROXYPROGESTERONE ACETATE 150 MG/ML
150 INJECTION, SUSPENSION INTRAMUSCULAR
Qty: 1 ML | Refills: 3 | OUTPATIENT
Start: 2019-02-14 | End: 2021-03-25 | Stop reason: ALTCHOICE

## 2019-02-14 RX ORDER — MEDROXYPROGESTERONE ACETATE 150 MG/ML
150 INJECTION, SUSPENSION INTRAMUSCULAR
Status: ACTIVE | OUTPATIENT
Start: 2019-02-14 | End: 2020-02-09

## 2019-02-14 RX ORDER — LISINOPRIL 10 MG/1
10 TABLET ORAL DAILY
Qty: 30 TABLET | Refills: 0 | Status: SHIPPED | OUTPATIENT
Start: 2019-02-14 | End: 2020-03-06

## 2019-02-14 RX ADMIN — MEDROXYPROGESTERONE ACETATE 150 MG: 150 INJECTION, SUSPENSION INTRAMUSCULAR at 11:38

## 2019-02-14 ASSESSMENT — ENCOUNTER SYMPTOMS
FEVER: 0
BREAST MASS: 0
NERVOUS/ANXIOUS: 0
WEAKNESS: 0
SHORTNESS OF BREATH: 0
FREQUENCY: 0
HEMATURIA: 0
HEARTBURN: 0
PARESTHESIAS: 0
COUGH: 0
ARTHRALGIAS: 0
CONSTIPATION: 0
SORE THROAT: 0
PALPITATIONS: 0
DIZZINESS: 0
NAUSEA: 0
DIARRHEA: 0
ABDOMINAL PAIN: 0
CHILLS: 0
EYE PAIN: 0
MYALGIAS: 0
DYSURIA: 0
HEMATOCHEZIA: 0
JOINT SWELLING: 0

## 2019-02-14 ASSESSMENT — MIFFLIN-ST. JEOR: SCORE: 1348.93

## 2019-02-14 NOTE — NURSING NOTE
Prior to injection, verified patient identity using patient's name and date of birth.  Due to injection administration, patient instructed to remain in clinic for 15 minutes  afterwards, and to report any adverse reaction to me immediately.    BP: 148/72    LAST PAP/EXAM:   Lab Results   Component Value Date    PAP NIL 10/14/2016     URINE HCG:not indicated    NEXT INJECTION DUE: 5/2/19 - 5/16/19       Drug Amount Wasted:  None.  Vial/Syringe: Single dose vial  Expiration Date:  02/2020    Gisselle West CMA

## 2019-02-14 NOTE — PROGRESS NOTES
SUBJECTIVE:   CC: Feliciano Arreguin is an 23 year old woman who presents for preventive health visit.     Physical   Annual:     Getting at least 3 servings of Calcium per day:  Yes    Bi-annual eye exam:  Yes    Dental care twice a year:  Yes    Sleep apnea or symptoms of sleep apnea:  None    Diet:  Diabetic    Frequency of exercise:  2-3 days/week    Duration of exercise:  15-30 minutes    Taking medications regularly:  Yes    Medication side effects:  None    Additional concerns today:  No    PHQ-2 Total Score: 0          Today's PHQ-2 Score:   PHQ-2 ( 1999 [employer]) 2/14/2019   Q1: Little interest or pleasure in doing things 0   Q2: Feeling down, depressed or hopeless 0   PHQ-2 Score 0   Q1: Little interest or pleasure in doing things Not at all   Q2: Feeling down, depressed or hopeless Not at all   PHQ-2 Score 0       Abuse: Current or Past(Physical, Sexual or Emotional)- No  Do you feel safe in your environment? Yes    Social History     Tobacco Use     Smoking status: Never Smoker     Smokeless tobacco: Never Used     Tobacco comment: no smokers in household   Substance Use Topics     Alcohol use: No     Alcohol Use 2/14/2019   If you drink alcohol do you typically have greater than 3 drinks per day OR greater than 7 drinks per week? No   No flowsheet data found.    Reviewed orders with patient.  Reviewed health maintenance and updated orders accordingly - Yes  Labs reviewed in EPIC  BP Readings from Last 3 Encounters:   02/14/19 148/72   08/23/18 122/76   07/24/18 (!) 164/107    Wt Readings from Last 3 Encounters:   02/14/19 65.7 kg (144 lb 12.8 oz)   07/24/18 66.2 kg (145 lb 15.1 oz)   05/18/18 62.7 kg (138 lb 3.2 oz)                  Patient Active Problem List   Diagnosis     Eczema     Sinus tachycardia     CARDIOVASCULAR SCREENING; LDL GOAL LESS THAN 130     Contraception     Uncontrolled type 1 diabetes mellitus with nephropathy (H)     Type 1 diabetes mellitus not at goal (H)     Past Surgical History:    Procedure Laterality Date     EYE SURGERY      lazy eye correction (strabismus?)       Social History     Tobacco Use     Smoking status: Never Smoker     Smokeless tobacco: Never Used     Tobacco comment: no smokers in household   Substance Use Topics     Alcohol use: No     Family History   Problem Relation Age of Onset     Allergies Mother         seasonal     Thyroid Disease Mother         hypothyridism     Lipids Father      Cancer Paternal Grandfather         skin c/a     Asthma No family hx of      C.A.D. No family hx of      Diabetes No family hx of      Hypertension No family hx of      Cerebrovascular Disease No family hx of      Breast Cancer No family hx of      Cancer - colorectal No family hx of      Prostate Cancer No family hx of      Alcohol/Drug No family hx of      Alzheimer Disease No family hx of      Anesthesia Reaction No family hx of      Arthritis No family hx of      Blood Disease No family hx of      Cardiovascular No family hx of      Circulatory No family hx of      Congenital Anomalies No family hx of      Connective Tissue Disorder No family hx of      Depression No family hx of      Eye Disorder No family hx of      Genetic Disorder No family hx of      Gastrointestinal Disease No family hx of      Genitourinary Problems No family hx of      Gynecology No family hx of      Heart Disease No family hx of      Musculoskeletal Disorder No family hx of      Neurologic Disorder No family hx of      Obesity No family hx of      Osteoporosis No family hx of      Psychotic Disorder No family hx of      Respiratory No family hx of      Hearing Loss No family hx of          Current Outpatient Medications   Medication Sig Dispense Refill     blood glucose monitoring (RACHEL CONTOUR NEXT) test strip Use to test blood sugar 4 times daily or as directed. 100 strip 0     Blood Glucose Monitoring Suppl (BLOOD GLUCOSE METER) KIT 1 kit 3 times daily. 1 kit 0     Blood Pressure KIT 1 kit daily 1 kit 0      insulin lispro (HUMALOG) 100 UNIT/ML injection Uses up to 75 units per day in insulin pump for basal, bolus, and priming of insulin pump tubing. 8 vial 2     insulin pen needle (B-D U/F) 31G X 8 MM Use 6 daily or as directed. 200 each 11     medroxyPROGESTERone (DEPO-PROVERA) 150 MG/ML IM injection Inject 1 mL (150 mg) into the muscle every 3 months 1 mL 3     Allergies   Allergen Reactions     Nkda [No Known Drug Allergies]      Seasonal Allergies      Spring/Fall     Recent Labs   Lab Test 07/24/18 05/30/18  0848 01/02/18 07/26/16 01/05/16  1016 09/15/15  0957  01/06/15  0959  05/14/13  0836 01/03/13  1030   A1C 7.4  --  7.8 8.1   < > 8.5* 9.7*   < > 10.9*   < > 9.6* 10.4*   LDL  --  110*  --   --   --   --   --   --  89  --  86 118   HDL  --  65  --   --   --   --   --   --   --   --   --  40*   TRIG  --  75  --   --   --   --   --   --   --   --   --  153*   ALT  --  35  --   --   --   --   --   --   --   --   --   --    CR  --  0.68  --   --   --   --  0.62  --  0.56   < >  --  0.64   GFRESTIMATED  --  >90  --   --   --   --  >90  Non  GFR Calc    --  >90  Non  GFR Calc     < >  --  >90   GFRESTBLACK  --  >90  --   --   --   --  >90  African American GFR Calc    --  >90   GFR Calc     < >  --  >90   POTASSIUM  --  4.4  --   --   --  4.3 4.1  --  4.0  --   --  4.1   TSH  --  2.04  --   --   --   --   --   --  1.41  --   --  2.73    < > = values in this interval not displayed.        Mammogram not appropriate for this patient based on age.    Pertinent mammograms are reviewed under the imaging tab.  History of abnormal Pap smear: NO - age 21-29 PAP every 3 years recommended  PAP / HPV 10/14/2016   PAP NIL     Reviewed and updated as needed this visit by clinical staff  Tobacco  Allergies  Meds  Med Hx  Surg Hx  Fam Hx  Soc Hx        Reviewed and updated as needed this visit by Provider        Past Medical History:   Diagnosis Date     Diabetes mellitus  "(H) 1998    Type 1     Diabetic eye exam (H) 08/09/2013    [facility]      Past Surgical History:   Procedure Laterality Date     EYE SURGERY      lazy eye correction (strabismus?)       Review of Systems   Constitutional: Negative for chills and fever.   HENT: Negative for congestion, ear pain and sore throat.    Eyes: Negative for pain and visual disturbance.   Respiratory: Negative for cough and shortness of breath.    Cardiovascular: Negative for chest pain, palpitations and peripheral edema.   Gastrointestinal: Negative for abdominal pain, constipation, diarrhea, heartburn, hematochezia and nausea.   Breasts:  Negative for tenderness, breast mass and discharge.   Genitourinary: Negative for dysuria, frequency, genital sores, hematuria, pelvic pain, urgency, vaginal bleeding and vaginal discharge.   Musculoskeletal: Negative for arthralgias, joint swelling and myalgias.   Skin: Negative for rash.   Neurological: Negative for dizziness, weakness and paresthesias.   Psychiatric/Behavioral: Negative for mood changes. The patient is not nervous/anxious.         OBJECTIVE:   /58   Pulse 120   Temp 99  F (37.2  C) (Temporal)   Resp 20   Ht 1.565 m (5' 1.61\")   Wt 65.7 kg (144 lb 12.8 oz)   BMI 26.82 kg/m    Physical Exam  GENERAL: healthy, alert and no distress  EYES: Eyes grossly normal to inspection, PERRL and conjunctivae and sclerae normal  HENT: ear canals and TM's normal, nose and mouth without ulcers or lesions  NECK: no adenopathy, no asymmetry, masses, or scars and thyroid normal to palpation  RESP: lungs clear to auscultation - no rales, rhonchi or wheezes  BREAST: declined  CV: regular rate and rhythm, normal S1 S2, no S3 or S4, no murmur, click or rub, no peripheral edema and peripheral pulses strong  ABDOMEN: soft, nontender, no hepatosplenomegaly, no masses and bowel sounds normal   (female): normal female external genitalia, normal urethral meatus, vaginal mucosa pink, moist, " "well rugated, and normal cervix/adnexa/uterus without masses or discharge  MS: no gross musculoskeletal defects noted, no edema  SKIN: no suspicious lesions or rashes  NEURO: Normal strength and tone, mentation intact and speech normal  PSYCH: mentation appears normal, affect normal/bright    Diagnostic Test Results:  none     ASSESSMENT/PLAN:   1. Encounter for routine adult health examination without abnormal findings  See notes    2. Uncontrolled type 1 diabetes mellitus with nephropathy (H)  Managed by endocrinology    3. Encounter for surveillance of injectable contraceptive  Stable. Continue current medication(s) and dose(s).   - medroxyPROGESTERone (DEPO-PROVERA) injection 150 mg; Inject 1 mL (150 mg) into the muscle every 3 months  - medroxyPROGESTERone (DEPO-PROVERA) 150 MG/ML IM injection; Inject 1 mL (150 mg) into the muscle every 3 months  Dispense: 1 mL; Refill: 3    4. Uncontrolled hypertension  Worsened. Increase lisinopril to 10 mg daily.  - lisinopril (PRINIVIL/ZESTRIL) 10 MG tablet; Take 1 tablet (10 mg) by mouth daily  Dispense: 30 tablet; Refill: 0    5. Screening for malignant neoplasm of cervix  - Pap imaged thin layer screen reflex to HPV if ASCUS - recommend age 25 - 29        COUNSELING:  Reviewed preventive health counseling, as reflected in patient instructions       Regular exercise       Healthy diet/nutrition       Contraception    BP Readings from Last 1 Encounters:   02/14/19 150/58     Estimated body mass index is 26.82 kg/m  as calculated from the following:    Height as of this encounter: 1.565 m (5' 1.61\").    Weight as of this encounter: 65.7 kg (144 lb 12.8 oz).      Weight management plan: Discussed healthy diet and exercise guidelines     reports that  has never smoked. she has never used smokeless tobacco.      Counseling Resources:  ATP IV Guidelines  Pooled Cohorts Equation Calculator  Breast Cancer Risk Calculator  FRAX Risk Assessment  ICSI Preventive Guidelines  Dietary " Guidelines for Americans, 2010  USDA's MyPlate  ASA Prophylaxis  Lung CA Screening    [facility]MENC[facility]

## 2019-02-15 PROCEDURE — G0145 SCR C/V CYTO,THINLAYER,RESCR: HCPCS | Performed by: STUDENT IN AN ORGANIZED HEALTH CARE EDUCATION/TRAINING PROGRAM

## 2019-02-19 LAB
COPATH REPORT: NORMAL
PAP: NORMAL

## 2019-03-12 ENCOUNTER — TELEPHONE (OUTPATIENT)
Dept: FAMILY MEDICINE | Facility: OTHER | Age: 24
End: 2019-03-12

## 2019-03-12 NOTE — LETTER
[facility]  [id number] [place] [id number]  Phone: [phone]  April 1, 2019      Feliciano Arreguin  [id number] [facility] [address]      Dear Feliciano,    We care about your health and have reviewed your health plan including your medical conditions, medications, and lab results.  Based on this review, it is recommended that you follow up regarding the following health topic(s):  -High Blood Pressure    We recommend you take the following action(s):  -schedule a FREE FLOAT MA-ONLY BLOOD PRESSURE APPOINTMENT within the next 1-4 weeks.     Please call us at the [facility] - [phone] (or use [employer]) to address the above recommendations.     Thank you for trusting [facility] and we appreciate the opportunity to serve you.  We look forward to supporting your healthcare needs in the future.    Healthy Regards,    Your Health Care Team  [facility]

## 2019-03-12 NOTE — TELEPHONE ENCOUNTER
Summary:    Patient is due/failing the following:   BP CHECK    Action needed:   Patient needs nurse only appointment.    Type of outreach:    Phone, left message for patient to call back.     Questions for provider review:    None                                                                                                                                    Jina Barrientos     Chart routed to Care Team .        Panel Management Review      Patient has the following on her problem list:     Diabetes    ASA:     Last A1C  Lab Results   Component Value Date    A1C 7.4 07/24/2018    A1C 7.8 01/02/2018    A1C 8.1 07/26/2016    A1C 8.6 04/05/2016    A1C 8.5 01/05/2016     A1C tested: Passed    Last LDL:    Lab Results   Component Value Date    CHOL 190 05/30/2018     Lab Results   Component Value Date    HDL 65 05/30/2018     Lab Results   Component Value Date     05/30/2018     Lab Results   Component Value Date    TRIG 75 05/30/2018     Lab Results   Component Value Date    CHOLHDLRATIO 5.0 01/03/2013     Lab Results   Component Value Date    NHDL 125 05/30/2018       Is the patient on a Statin? NO             Is the patient on Aspirin? NO        Last three blood pressure readings:  BP Readings from Last 3 Encounters:   02/14/19 148/72   08/23/18 122/76   07/24/18 (!) 164/107            Tobacco History:     History   Smoking Status     Never Smoker   Smokeless Tobacco     Never Used     Comment: no smokers in household           Composite cancer screening  Chart review shows that this patient is due/due soon for the following None

## 2019-03-18 ENCOUNTER — DOCUMENTATION ONLY (OUTPATIENT)
Dept: ENDOCRINOLOGY | Facility: CLINIC | Age: 24
End: 2019-03-18

## 2019-03-18 NOTE — PROGRESS NOTES
Prescription for pump supplies completed and faxed to [employer] at [phone].    Brandy Rodriguez LPN  Diabetes Clinic Coordinator   [facility] End[facility]in[facility]  [facility]

## 2019-04-22 ENCOUNTER — MYC MEDICAL ADVICE (OUTPATIENT)
Dept: FAMILY MEDICINE | Facility: OTHER | Age: 24
End: 2019-04-22

## 2019-05-15 ENCOUNTER — MYC MEDICAL ADVICE (OUTPATIENT)
Dept: ENDOCRINOLOGY | Facility: CLINIC | Age: 24
End: 2019-05-15

## 2019-05-16 ENCOUNTER — ALLIED HEALTH/NURSE VISIT (OUTPATIENT)
Dept: FAMILY MEDICINE | Facility: OTHER | Age: 24
End: 2019-05-16
Payer: COMMERCIAL

## 2019-05-16 PROCEDURE — 96372 THER/PROPH/DIAG INJ SC/IM: CPT

## 2019-05-16 PROCEDURE — 99207 ZZC NO CHARGE NURSE ONLY: CPT

## 2019-05-16 RX ADMIN — MEDROXYPROGESTERONE ACETATE 150 MG: 150 INJECTION, SUSPENSION INTRAMUSCULAR at 14:18

## 2019-05-16 NOTE — PROGRESS NOTES
Prior to injection, verified patient identity using patient's name and date of birth.  Due to injection administration, patient instructed to remain in clinic for 15 minutes  afterwards, and to report any adverse reaction to me immediately.    BP: Data Unavailable    LAST PAP/EXAM:   Lab Results   Component Value Date    PAP NIL 02/15/2019     URINE HCG:not indicated    NEXT INJECTION DUE: 8/1/19 - 8/15/19       Drug Amount Wasted:  None.  Vial/Syringe: Single dose vial  Expiration Date:  2/28/20

## 2019-05-20 NOTE — TELEPHONE ENCOUNTER
Form printed and filled out to the best of my ability.  Placed form in Dr De Dios's folder for review/signature.  Verito Velasquez, CMA

## 2019-07-10 ENCOUNTER — OFFICE VISIT (OUTPATIENT)
Dept: ENDOCRINOLOGY | Facility: CLINIC | Age: 24
End: 2019-07-10
Payer: COMMERCIAL

## 2019-07-10 VITALS
OXYGEN SATURATION: 97 % | DIASTOLIC BLOOD PRESSURE: 93 MMHG | BODY MASS INDEX: 27.48 KG/M2 | WEIGHT: 148.4 LBS | SYSTOLIC BLOOD PRESSURE: 167 MMHG | HEART RATE: 144 BPM

## 2019-07-10 DIAGNOSIS — I10 BENIGN ESSENTIAL HYPERTENSION: ICD-10-CM

## 2019-07-10 DIAGNOSIS — E04.9 GOITER: ICD-10-CM

## 2019-07-10 DIAGNOSIS — E10.9 TYPE 1 DIABETES MELLITUS NOT AT GOAL (H): Primary | ICD-10-CM

## 2019-07-10 LAB — HBA1C MFR BLD: 7.4 % (ref 0–5.6)

## 2019-07-10 PROCEDURE — 83036 HEMOGLOBIN GLYCOSYLATED A1C: CPT | Performed by: INTERNAL MEDICINE

## 2019-07-10 PROCEDURE — 95251 CONT GLUC MNTR ANALYSIS I&R: CPT | Performed by: INTERNAL MEDICINE

## 2019-07-10 PROCEDURE — 36415 COLL VENOUS BLD VENIPUNCTURE: CPT | Performed by: INTERNAL MEDICINE

## 2019-07-10 PROCEDURE — 99214 OFFICE O/P EST MOD 30 MIN: CPT | Mod: 25 | Performed by: INTERNAL MEDICINE

## 2019-07-10 NOTE — PROGRESS NOTES
The patient is seen in f/up for evaluation of diabetes. She was last seen in the clinic in 7/2018.    Feliciano Arreguin is a 24 year old female diagnosed with type 1 diabetes in 2008, when she was found to be in DKA. Hemoglobin A1c used to be between 9.3 and 10.9 and it significantly decreased since 2016, to 8-8.5%. It was 7.4% today, unchanged since her last visit here.    Her diabetes is known to be complicated by intermittent microalbuminuria.  She was started on a DEXCOM sensor in the beginning of 2017.     In a regular day she has 3 meals and occasional  snacks, mainly in the afternoon.  Dinner is around 7-8 PM and she denies snacking at bedtime.    His blood pressure today in the clinic was very elevated.  She reports consistently checking her blood pressure at home, where the numbers are below 130/80.  This morning, her blood pressure at home was 127/73.    Insulin pump (Minimed 530G - 2015) settings:  Basal rate at 12 midnight 1.45 units per hour, at 1 PM 1.55 units per hour, at 7 PM 1.45 units per hour.  Insulin to carb ratio 1 U per 6 grams  Sensitivity 30   Target 90 - 120   Active insulin time 3 hrs     Insulin pump settings revealed that she checks her blood glucose 3 times daily.  Average blood glucose is 179 with a standard deviation of 53.  Total daily insulin dose is 55 units, of which 64% is basal insulin.  She continues to bolus mainly manually, with small dosages of 1 unit, sometimes up to 8 or 9 times daily.  She states that she takes this manual correction for elevated blood sugar numbers noted on the sensor.  The carbohydrate intake is entered inconsistently, sometimes once a day or twice daily and most of the dosages are small, around 15 g.      The Dexcom sensor reveals an average blood glucose of 175 with a standard deviation of 49.  48% of the blood sugar numbers are within target of 70 to 180, 51% are above 180 and 1% are in the mild hypoglycemic range.  There are no hypoglycemic episodes  noted on the glucometer.  On the sensor, they tend to occur occasionally, mainly around 6 PM.  There is a tendency towards persistent hyperglycemia from 6 AM to 3 PM.  Recently, she has not been exercising but she plans to restart kickboxing.  She states that if she does use the correction insulin in the morning, she gets a hypoglycemic episode.    Diabetes complications:  Retinopathy: last eye exam 12/2018; mild DR   Nephropathy: h/o proteinuria dating back to 2010  Neuropathy: no numbness or tingling sensation     Feels shaky, sweaty and dizzy when BG drops in the 90s. Lowest BG she remembers was 35. She denies prior episodes of loss of consciousness due to hypoglycemia.  She has glucagon at home and her parents know how to administer it.  She had one episode of diabetes ketoacidosis in 1999.    Past Medical History:   Diagnosis Date     Diabetes mellitus (H) 1998    Type 1     Diabetic eye exam (H) 08/09/2013    [facility]   Treated with Depo-Provera since, approximately, 2014    Past Surgical History:   Procedure Laterality Date     EYE SURGERY      lazy eye correction (strabismus?)     Current Medications   On depoprovera since 2014 (pt preferred not to have MP)       Current Outpatient Medications:      blood glucose monitoring (RACHEL CONTOUR NEXT) test strip, Use to test blood sugar 4 times daily or as directed., Disp: 100 strip, Rfl: 0     Blood Glucose Monitoring Suppl (BLOOD GLUCOSE METER) KIT, 1 kit 3 times daily., Disp: 1 kit, Rfl: 0     Blood Pressure KIT, 1 kit daily, Disp: 1 kit, Rfl: 0     insulin lispro (HUMALOG) 100 UNIT/ML injection, Uses up to 75 units per day in insulin pump for basal, bolus, and priming of insulin pump tubing., Disp: 8 vial, Rfl: 2     lisinopril (PRINIVIL/ZESTRIL) 10 MG tablet, Take 1 tablet (10 mg) by mouth daily, Disp: 30 tablet, Rfl: 0     medroxyPROGESTERone (DEPO-PROVERA) 150 MG/ML IM injection, Inject 1 mL (150 mg) into the muscle every 3 months, Disp: 1 mL,  Rfl: 3    Current Facility-Administered Medications:      medroxyPROGESTERone (DEPO-PROVERA) injection 150 mg, 150 mg, Intramuscular, Q90 Days, Cecilia Banks, CLEMENCIA CNP, 150 mg at 05/16/19 1418    Family History   Mother has hypothyroidism. Father has hypercholesterolemia. Grandfather melanoma. No known family history of hypertension, death at a young age, stroke.     Social History  Single. No children. She denies smoking, drinking alcohol or using illicit drugs. Occupation: .     Review of Systems   Systemic:               No fatigue   Eye:                       No eye symptoms   Anmol-Laryngeal:      No anmol-laryngeal symptoms, no dysphagia, no voice hoarseness, no cough      Breast:                   No breast symptoms  Cardiovascular:     No cardiovascular symptoms, no CP or palpitations   Pulmonary:            No pulmonary symptoms, no cough or SOB    Gastrointestinal:    No gastrointestinal symptoms, no diarrhea or constipation   Genitourinary:        No genitourinary symptoms    Endocrine:             No endocrine symptoms, no heat or cold intolerance, no increased sweating, no easy bruising      Neurological:         Longstanding headaches - less frequent, no tremor, no dizziness     Musculoskeletal:   Intermittent lower back pain   Skin:                       No skin symptoms   Psychological:       Gets anxious easily           Vital Signs     Previous Weights:    Wt Readings from Last 10 Encounters:   07/10/19 67.3 kg (148 lb 6.4 oz)   02/14/19 65.7 kg (144 lb 12.8 oz)   07/24/18 66.2 kg (145 lb 15.1 oz)   05/18/18 62.7 kg (138 lb 3.2 oz)   12/08/17 64.5 kg (142 lb 1.6 oz)   11/07/17 64.6 kg (142 lb 8 oz)   09/21/17 63 kg (139 lb)   04/04/17 62.6 kg (138 lb)   03/13/17 (P) 63.5 kg (140 lb)   10/14/16 61.2 kg (135 lb)     Vital Signs:  BP (!) 167/93 (BP Location: Left arm, Patient Position: Sitting, Cuff Size: Adult Regular)   Pulse 144   Wt 67.3 kg (148 lb 6.4 oz)   SpO2 97%   BMI  27.48 kg/m      Physical Exam  General Appearance:  she is well developed, well nourished and in no distress     Eyes:  conjutivae and extra-ocular motions are normal.                                    pupils round and reactive to light, no lid lag, no stare    HEENT:   oropharynx clear and moist, no JVD, no bruits     Mild thyroid enlargement, no palpable nodules   Gastrointestinal: abdomen soft, non-tender, non-distended, normal bowel sounds,   no organomegaly   Musculoskeletal: normal tone and strength  GI:                            Abdomen soft, nontender, nondistended, positive bowel sounds  Psychiatric: affect and judgment normal  Skin: warm, no lesions   Neurological: reflexes normal and symmetric, no resting tremor  Feet:                         Sensation intact to monofilament testing    Lab Results  I reviewed prior lab results documented in Epic.  Lab Results   Component Value Date    A1C 7.4 (A) 07/10/2019    A1C 7.4 07/24/2018    A1C 7.8 01/02/2018    A1C 8.1 07/26/2016    A1C 8.6 (A) 04/05/2016       Hemoglobin   Date Value Ref Range Status   09/22/2008 16.0 (H) 11.7 - 15.7 g/dL Final     Hematocrit   Date Value Ref Range Status   05/30/2018 45.0 35.0 - 47.0 % Final     Cholesterol   Date Value Ref Range Status   05/30/2018 190 <200 mg/dL Final     Cholesterol/HDL Ratio   Date Value Ref Range Status   01/03/2013 5.0 0.0 - 5.0 Final     HDL Cholesterol   Date Value Ref Range Status   05/30/2018 65 >49 mg/dL Final     LDL Cholesterol Calculated   Date Value Ref Range Status   05/30/2018 110 (H) <100 mg/dL Final     Comment:     Above desirable:  100-129 mg/dl  Borderline High:  130-159 mg/dL  High:             160-189 mg/dL  Very high:       >189 mg/dl       VLDL-Cholesterol   Date Value Ref Range Status   01/03/2013 31 (H) 0 - 30 mg/dL Final     Triglycerides   Date Value Ref Range Status   05/30/2018 75 <150 mg/dL Final     Albumin Urine mg/L   Date Value Ref Range Status   05/30/2018 29 mg/L Final      TSH   Date Value Ref Range Status   05/30/2018 2.04 0.40 - 4.00 mU/L Final         Last Basic Metabolic Panel:    Sodium   Date Value Ref Range Status   05/30/2018 135 133 - 144 mmol/L Final     Potassium   Date Value Ref Range Status   05/30/2018 4.4 3.4 - 5.3 mmol/L Final     Chloride   Date Value Ref Range Status   05/30/2018 101 94 - 109 mmol/L Final     Calcium   Date Value Ref Range Status   05/30/2018 8.8 8.5 - 10.1 mg/dL Final     Carbon Dioxide   Date Value Ref Range Status   05/30/2018 24 20 - 32 mmol/L Final     Urea Nitrogen   Date Value Ref Range Status   05/30/2018 10 7 - 30 mg/dL Final     Creatinine   Date Value Ref Range Status   05/30/2018 0.68 0.52 - 1.04 mg/dL Final     GFR Estimate   Date Value Ref Range Status   05/30/2018 >90 >60 mL/min/1.7m2 Final     Comment:     Non  GFR Calc     Glucose   Date Value Ref Range Status   05/30/2018 178 (H) 70 - 99 mg/dL Final       AST   Date Value Ref Range Status   05/30/2018 20 0 - 45 U/L Final     ALT   Date Value Ref Range Status   05/30/2018 35 0 - 50 U/L Final     Albumin   Date Value Ref Range Status   05/30/2018 4.1 3.4 - 5.0 g/dL Final       Assessment     1. Type 1 diabetes, not at goal, complicated by mild diabetic retinopathy and intermittent microalbuminuria.   She continues to use the insulin pump and appropriately, by bolusing manually, with small amounts of insulin, in order to correct postprandial hyperglycemia.  I counseled the patient on the importance of counting carbohydrates, using the bolus wizard for both food intake and correction.  I also encouraged the patient to enter the sensor number in the pump and act on that number based on the pump settings.  Discussed about insulin stocking with manual boluses, which can result in hypoglycemic episodes.  In order to encourage her to use the bolus wizard appropriately, I decreased the insulin to carbohydrate ratio to 1 unit per 7 g around the clock and I decreased the  basal rate at 6 AM to 1.35 units/h and at 1 PM to 1.45 units/h.  The sensitivity was changed to 35.    Follow-up lab work.     2.  Hypertension   Almost always, her blood pressure is elevated in the clinic and the patient confirms being very anxious.  She is now medicated with 10 mg lisinopril daily.  Follow-up urine microalbumin.  I encouraged the patient to send us the blood pressure numbers recorded at home and bring her blood pressure machine in the clinic, to confirm the machine is accurate by comparing it's readings with our readings.    3.  Mild goiter on clinical exam  Follow-up reflex TSH.    Orders Placed This Encounter   Procedures     Hemoglobin A1c POCT     Albumin Random Urine Quantitative with Creat Ratio     Comprehensive metabolic panel     Hematocrit     Lipid panel reflex to direct LDL Fasting     TSH with free T4 reflex

## 2019-07-10 NOTE — PATIENT INSTRUCTIONS
Change basal rate at 12 midnight to 1.45 U/hr, at 6 AM to 1.35 U/hr and at 1 PM to 1.45 U/hr   Change insulin to carb ratio to 1 U per 7 grams around the clock  Maintain correction at 30   Always enter the BG from the senor and correct it using the bolus wizard  Cover all meals and snacks with insulin, according to the bolus wizard.

## 2019-07-10 NOTE — LETTER
7/10/2019         RE: Feliciano Sahu  [address]  [facility] [id number]        Dear Colleague,    Thank you for referring your patient, Feliciano Sahu, to the [facility]. Please see a copy of my visit note below.      The patient is seen in f/up for evaluation of diabetes. She was last seen in the clinic in 7/2018.    Feliciano Arreguin is a 24 year old female diagnosed with type 1 diabetes in 2008, when she was found to be in DKA. Hemoglobin A1c used to be between 9.3 and 10.9 and it significantly decreased since 2016, to 8-8.5%. It was 7.4% today, unchanged since her last visit here.    Her diabetes is known to be complicated by intermittent microalbuminuria.  She was started on a DEXCOM sensor in the beginning of 2017.     In a regular day she has 3 meals and occasional  snacks, mainly in the afternoon.  Dinner is around 7-8 PM and she denies snacking at bedtime.    His blood pressure today in the clinic was very elevated.  She reports consistently checking her blood pressure at home, where the numbers are below 130/80.  This morning, her blood pressure at home was 127/73.    Insulin pump (Minimed 530G - 2015) settings:  Basal rate at 12 midnight 1.45 units per hour, at 1 PM 1.55 units per hour, at 7 PM 1.45 units per hour.  Insulin to carb ratio 1 U per 6 grams  Sensitivity 30   Target 90 - 120   Active insulin time 3 hrs     Insulin pump settings revealed that she checks her blood glucose 3 times daily.  Average blood glucose is 179 with a standard deviation of 53.  Total daily insulin dose is 55 units, of which 64% is basal insulin.  She continues to bolus mainly manually, with small dosages of 1 unit, sometimes up to 8 or 9 times daily.  She states that she takes this manual correction for elevated blood sugar numbers noted on the sensor.  The carbohydrate intake is entered inconsistently, sometimes once a day or twice daily and most of the dosages are small, around 15 g.      The  Dexcom sensor reveals an average blood glucose of 175 with a standard deviation of 49.  48% of the blood sugar numbers are within target of 70 to 180, 51% are above 180 and 1% are in the mild hypoglycemic range.  There are no hypoglycemic episodes noted on the glucometer.  On the sensor, they tend to occur occasionally, mainly around 6 PM.  There is a tendency towards persistent hyperglycemia from 6 AM to 3 PM.  Recently, she has not been exercising but she plans to restart kickboxing.  She states that if she does use the correction insulin in the morning, she gets a hypoglycemic episode.    Diabetes complications:  Retinopathy: last eye exam 12/2018; mild DR   Nephropathy: h/o proteinuria dating back to 2010  Neuropathy: no numbness or tingling sensation     Feels shaky, sweaty and dizzy when BG drops in the 90s. Lowest BG she remembers was 35. She denies prior episodes of loss of consciousness due to hypoglycemia.  She has glucagon at home and her parents know how to administer it.  She had one episode of diabetes ketoacidosis in 1999.    Past Medical History:   Diagnosis Date     Diabetes mellitus (H) 1998    Type 1     Diabetic eye exam (H) 08/09/2013    [facility]   Treated with Depo-Provera since, approximately, 2014    Past Surgical History:   Procedure Laterality Date     EYE SURGERY      lazy eye correction (strabismus?)     Current Medications   On depoprovera since 2014 (pt preferred not to have MP)       Current Outpatient Medications:      blood glucose monitoring (RACHEL CONTOUR NEXT) test strip, Use to test blood sugar 4 times daily or as directed., Disp: 100 strip, Rfl: 0     Blood Glucose Monitoring Suppl (BLOOD GLUCOSE METER) KIT, 1 kit 3 times daily., Disp: 1 kit, Rfl: 0     Blood Pressure KIT, 1 kit daily, Disp: 1 kit, Rfl: 0     insulin lispro (HUMALOG) 100 UNIT/ML injection, Uses up to 75 units per day in insulin pump for basal, bolus, and priming of insulin pump tubing., Disp: 8 vial,  Rfl: 2     lisinopril (PRINIVIL/ZESTRIL) 10 MG tablet, Take 1 tablet (10 mg) by mouth daily, Disp: 30 tablet, Rfl: 0     medroxyPROGESTERone (DEPO-PROVERA) 150 MG/ML IM injection, Inject 1 mL (150 mg) into the muscle every 3 months, Disp: 1 mL, Rfl: 3    Current Facility-Administered Medications:      medroxyPROGESTERone (DEPO-PROVERA) injection 150 mg, 150 mg, Intramuscular, Q90 Days, Cecilia Banks, APRN CNP, 150 mg at 05/16/19 1418    Family History   Mother has hypothyroidism. Father has hypercholesterolemia. Grandfather melanoma. No known family history of hypertension, death at a young age, stroke.     Social History  Single. No children. She denies smoking, drinking alcohol or using illicit drugs. Occupation: .     Review of Systems   Systemic:               No fatigue   Eye:                       No eye symptoms   Anmol-Laryngeal:      No anmol-laryngeal symptoms, no dysphagia, no voice hoarseness, no cough      Breast:                   No breast symptoms  Cardiovascular:     No cardiovascular symptoms, no CP or palpitations   Pulmonary:            No pulmonary symptoms, no cough or SOB    Gastrointestinal:    No gastrointestinal symptoms, no diarrhea or constipation   Genitourinary:        No genitourinary symptoms    Endocrine:             No endocrine symptoms, no heat or cold intolerance, no increased sweating, no easy bruising      Neurological:         Longstanding headaches - less frequent, no tremor, no dizziness     Musculoskeletal:   Intermittent lower back pain   Skin:                       No skin symptoms   Psychological:       Gets anxious easily           Vital Signs     Previous Weights:    Wt Readings from Last 10 Encounters:   07/10/19 67.3 kg (148 lb 6.4 oz)   02/14/19 65.7 kg (144 lb 12.8 oz)   07/24/18 66.2 kg (145 lb 15.1 oz)   05/18/18 62.7 kg (138 lb 3.2 oz)   12/08/17 64.5 kg (142 lb 1.6 oz)   11/07/17 64.6 kg (142 lb 8 oz)   09/21/17 63 kg (139 lb)   04/04/17  62.6 kg (138 lb)   03/13/17 (P) 63.5 kg (140 lb)   10/14/16 61.2 kg (135 lb)     Vital Signs:  BP (!) 167/93 (BP Location: Left arm, Patient Position: Sitting, Cuff Size: Adult Regular)   Pulse 144   Wt 67.3 kg (148 lb 6.4 oz)   SpO2 97%   BMI 27.48 kg/m       Physical Exam  General Appearance:  she is well developed, well nourished and in no distress     Eyes:  conjutivae and extra-ocular motions are normal.                                    pupils round and reactive to light, no lid lag, no stare    HEENT:   oropharynx clear and moist, no JVD, no bruits     Mild thyroid enlargement, no palpable nodules   Gastrointestinal: abdomen soft, non-tender, non-distended, normal bowel sounds,   no organomegaly   Musculoskeletal: normal tone and strength  GI:                            Abdomen soft, nontender, nondistended, positive bowel sounds  Psychiatric: affect and judgment normal  Skin: warm, no lesions   Neurological: reflexes normal and symmetric, no resting tremor  Feet:                         Sensation intact to monofilament testing    Lab Results  I reviewed prior lab results documented in Epic.  Lab Results   Component Value Date    A1C 7.4 (A) 07/10/2019    A1C 7.4 07/24/2018    A1C 7.8 01/02/2018    A1C 8.1 07/26/2016    A1C 8.6 (A) 04/05/2016       Hemoglobin   Date Value Ref Range Status   09/22/2008 16.0 (H) 11.7 - 15.7 g/dL Final     Hematocrit   Date Value Ref Range Status   05/30/2018 45.0 35.0 - 47.0 % Final     Cholesterol   Date Value Ref Range Status   05/30/2018 190 <200 mg/dL Final     Cholesterol/HDL Ratio   Date Value Ref Range Status   01/03/2013 5.0 0.0 - 5.0 Final     HDL Cholesterol   Date Value Ref Range Status   05/30/2018 65 >49 mg/dL Final     LDL Cholesterol Calculated   Date Value Ref Range Status   05/30/2018 110 (H) <100 mg/dL Final     Comment:     Above desirable:  100-129 mg/dl  Borderline High:  130-159 mg/dL  High:             160-189 mg/dL  Very high:       >189 mg/dl        VLDL-Cholesterol   Date Value Ref Range Status   01/03/2013 31 (H) 0 - 30 mg/dL Final     Triglycerides   Date Value Ref Range Status   05/30/2018 75 <150 mg/dL Final     Albumin Urine mg/L   Date Value Ref Range Status   05/30/2018 29 mg/L Final     TSH   Date Value Ref Range Status   05/30/2018 2.04 0.40 - 4.00 mU/L Final         Last Basic Metabolic Panel:    Sodium   Date Value Ref Range Status   05/30/2018 135 133 - 144 mmol/L Final     Potassium   Date Value Ref Range Status   05/30/2018 4.4 3.4 - 5.3 mmol/L Final     Chloride   Date Value Ref Range Status   05/30/2018 101 94 - 109 mmol/L Final     Calcium   Date Value Ref Range Status   05/30/2018 8.8 8.5 - 10.1 mg/dL Final     Carbon Dioxide   Date Value Ref Range Status   05/30/2018 24 20 - 32 mmol/L Final     Urea Nitrogen   Date Value Ref Range Status   05/30/2018 10 7 - 30 mg/dL Final     Creatinine   Date Value Ref Range Status   05/30/2018 0.68 0.52 - 1.04 mg/dL Final     GFR Estimate   Date Value Ref Range Status   05/30/2018 >90 >60 mL/min/1.7m2 Final     Comment:     Non  GFR Calc     Glucose   Date Value Ref Range Status   05/30/2018 178 (H) 70 - 99 mg/dL Final       AST   Date Value Ref Range Status   05/30/2018 20 0 - 45 U/L Final     ALT   Date Value Ref Range Status   05/30/2018 35 0 - 50 U/L Final     Albumin   Date Value Ref Range Status   05/30/2018 4.1 3.4 - 5.0 g/dL Final       Assessment     1. Type 1 diabetes, not at goal, complicated by mild diabetic retinopathy and intermittent microalbuminuria.   She continues to use the insulin pump and appropriately, by bolusing manually, with small amounts of insulin, in order to correct postprandial hyperglycemia.  I counseled the patient on the importance of counting carbohydrates, using the bolus wizard for both food intake and correction.  I also encouraged the patient to enter the sensor number in the pump and act on that number based on the pump settings.  Discussed about  insulin stocking with manual boluses, which can result in hypoglycemic episodes.  In order to encourage her to use the bolus wizard appropriately, I decreased the insulin to carbohydrate ratio to 1 unit per 7 g around the clock and I decreased the basal rate at 6 AM to 1.35 units/h and at 1 PM to 1.45 units/h.  The sensitivity was changed to 35.    Follow-up lab work.     2.  Hypertension   Almost always, her blood pressure is elevated in the clinic and the patient confirms being very anxious.  She is now medicated with 10 mg lisinopril daily.  Follow-up urine microalbumin.  I encouraged the patient to send us the blood pressure numbers recorded at home and bring her blood pressure machine in the clinic, to confirm the machine is accurate by comparing it's readings with our readings.    3.  Mild goiter on clinical exam  Follow-up reflex TSH.    Orders Placed This Encounter   Procedures     Hemoglobin A1c POCT     Albumin Random Urine Quantitative with Creat Ratio     Comprehensive metabolic panel     Hematocrit     Lipid panel reflex to direct LDL Fasting     TSH with free T4 reflex                Again, thank you for allowing me to participate in the care of your patient.        Sincerely,        Eda De Dios MD

## 2019-07-10 NOTE — NURSING NOTE
Feliciano Sahu's goals for this visit include:   Chief Complaint   Patient presents with     Diabetes     She requests these members of her care team be copied on today's visit information: Yes    PCP: Stacey Sandoval    Referring Provider:  Stacey Sandoval PA-C  [id number] [place] [address]    BP (!) 167/93 (BP Location: Left arm, Patient Position: Sitting, Cuff Size: Adult Regular)   Pulse 144   Wt 67.3 kg (148 lb 6.4 oz)   SpO2 97%   BMI 27.48 kg/m      Do you need any medication refills at today's visit? No - but would like new prescription for syringes.

## 2019-07-11 ENCOUNTER — TELEPHONE (OUTPATIENT)
Dept: ENDOCRINOLOGY | Facility: CLINIC | Age: 24
End: 2019-07-11

## 2019-07-11 NOTE — TELEPHONE ENCOUNTER
1st Attempt LVM to set up a 1 month telephone visit with Dr De Dios along with the patient's 1 yr follow up. I asked Feliciano to call me back at [phone]. I was going to try and set up the lab appointment that Dr De Dios requested.     Enrrique Yeh  Procedure , [place]  [facility]

## 2019-07-18 DIAGNOSIS — E10.9 TYPE 1 DIABETES MELLITUS NOT AT GOAL (H): ICD-10-CM

## 2019-07-18 DIAGNOSIS — E04.9 GOITER: ICD-10-CM

## 2019-07-18 LAB
ALBUMIN SERPL-MCNC: 4.1 G/DL (ref 3.4–5)
ALP SERPL-CCNC: 64 U/L (ref 40–150)
ALT SERPL W P-5'-P-CCNC: 34 U/L (ref 0–50)
ANION GAP SERPL CALCULATED.3IONS-SCNC: 9 MMOL/L (ref 3–14)
AST SERPL W P-5'-P-CCNC: 16 U/L (ref 0–45)
BILIRUB SERPL-MCNC: 0.4 MG/DL (ref 0.2–1.3)
BUN SERPL-MCNC: 12 MG/DL (ref 7–30)
CALCIUM SERPL-MCNC: 9.1 MG/DL (ref 8.5–10.1)
CHLORIDE SERPL-SCNC: 104 MMOL/L (ref 94–109)
CHOLEST SERPL-MCNC: 158 MG/DL
CO2 SERPL-SCNC: 23 MMOL/L (ref 20–32)
CREAT SERPL-MCNC: 0.62 MG/DL (ref 0.52–1.04)
CREAT UR-MCNC: 21 MG/DL
GFR SERPL CREATININE-BSD FRML MDRD: >90 ML/MIN/{1.73_M2}
GLUCOSE SERPL-MCNC: 165 MG/DL (ref 70–99)
HDLC SERPL-MCNC: 59 MG/DL
LDLC SERPL CALC-MCNC: 75 MG/DL
MICROALBUMIN UR-MCNC: 19 MG/L
MICROALBUMIN/CREAT UR: 89.2 MG/G CR (ref 0–25)
NONHDLC SERPL-MCNC: 99 MG/DL
POTASSIUM SERPL-SCNC: 3.8 MMOL/L (ref 3.4–5.3)
PROT SERPL-MCNC: 8.3 G/DL (ref 6.8–8.8)
SODIUM SERPL-SCNC: 136 MMOL/L (ref 133–144)
TRIGL SERPL-MCNC: 118 MG/DL
TSH SERPL DL<=0.005 MIU/L-ACNC: 3.61 MU/L (ref 0.4–4)

## 2019-07-18 PROCEDURE — 36415 COLL VENOUS BLD VENIPUNCTURE: CPT | Performed by: INTERNAL MEDICINE

## 2019-07-18 PROCEDURE — 84443 ASSAY THYROID STIM HORMONE: CPT | Performed by: INTERNAL MEDICINE

## 2019-07-18 PROCEDURE — 82043 UR ALBUMIN QUANTITATIVE: CPT | Performed by: INTERNAL MEDICINE

## 2019-07-18 PROCEDURE — 85014 HEMATOCRIT: CPT | Performed by: INTERNAL MEDICINE

## 2019-07-18 PROCEDURE — 80061 LIPID PANEL: CPT | Performed by: INTERNAL MEDICINE

## 2019-07-18 PROCEDURE — 80053 COMPREHEN METABOLIC PANEL: CPT | Performed by: INTERNAL MEDICINE

## 2019-07-19 LAB — HCT VFR BLD AUTO: 45.4 % (ref 35–47)

## 2019-08-01 NOTE — TELEPHONE ENCOUNTER
Feliciano called back and scheduled her 1 yr follow up with Dr De Dios, but she said that she will need to call back to set up the 1 month follow up telephone visit.     Enrrique Yeh  Procedure , [place]  [facility]

## 2019-08-01 NOTE — TELEPHONE ENCOUNTER
2nd Attempt LVM for Feliciano to call back to schedule her one month telephone visit and her one year follow up appointment with Dr De Dios. I asked Feliciano to please call me back directly at [phone] to schedule these appointments.     Enrrique Yeh  Procedure , [place]  [facility]

## 2019-08-02 ENCOUNTER — ALLIED HEALTH/NURSE VISIT (OUTPATIENT)
Dept: FAMILY MEDICINE | Facility: OTHER | Age: 24
End: 2019-08-02
Payer: COMMERCIAL

## 2019-08-02 VITALS — SYSTOLIC BLOOD PRESSURE: 136 MMHG | DIASTOLIC BLOOD PRESSURE: 70 MMHG

## 2019-08-02 PROCEDURE — 99207 ZZC NO CHARGE NURSE ONLY: CPT

## 2019-08-02 PROCEDURE — 96372 THER/PROPH/DIAG INJ SC/IM: CPT

## 2019-08-02 RX ADMIN — MEDROXYPROGESTERONE ACETATE 150 MG: 150 INJECTION, SUSPENSION INTRAMUSCULAR at 10:00

## 2019-08-02 NOTE — NURSING NOTE
Chief Complaint   Patient presents with     Allied Health Visit     Clinic Administered Medication Documentation      Depo Provera Documentation    Prior to injection, verified patient identity using patient's name and date of birth. Medication was administered. Please see MAR and medication order for additional information. Patient instructed to remain in clinic for 15 minutes.    BP: 136/70    LAST PAP/EXAM:   Lab Results   Component Value Date    PAP NIL 02/15/2019     URINE HCG:not indicated    NEXT INJECTION DUE: 10/18/19 - 11/1/19    Was entire vial of medication used? Yes  Vial/Syringe: Single dose vial  Expiration Date:  6/2020    Myra Bruno CMA (AAMA)

## 2019-08-06 ENCOUNTER — VIRTUAL VISIT (OUTPATIENT)
Dept: FAMILY MEDICINE | Facility: OTHER | Age: 24
End: 2019-08-06

## 2019-08-06 NOTE — PROGRESS NOTES
"Date:   Clinician: Satish Morrow  Clinician NPI: [id number]  Patient: Feliciano Sahu  Patient : 1995  Patient Address: [address] [place] [address]  Patient Phone: [phone]  Visit Protocol: URI  Patient Summary:  Feliciano is a 24 year old ( : 1995 ) female who initiated a Visit for cold, sinus infection, or influenza. When asked the question \"Please sign me up to receive news, health information and promotions from [employer].\", Feliciano responded \"No\".    Feliciano states her symptoms started gradually 3-6 days ago.   Her symptoms consist of a headache, malaise, facial pain or pressure, and nasal congestion. She is experiencing difficulty breathing due to nasal congestion but she is not short of breath.   Symptom details     Nasal secretions: The color of her mucus is white, green, and yellow.    Facial pain or pressure: The facial pain or pressure feels worse when bending over or leaning forward.     Headache: She states the headache is severe (7-9 on a 10 point pain scale).      Feliciano denies having sore throat, myalgias, chills, cough, fever, rhinitis, wheezing, teeth pain, and ear pain. She also denies double sickening (worsening symptoms after initial improvement), having recent facial or sinus surgery in the past 60 days, and taking antibiotic medication for the symptoms.    Pertinent medical history  Feliciano had 1 sinus infection within the past year.   Feliciano typically gets a yeast infection when she takes antibiotics. She is not sure if she has used fluconazole (Diflucan) to treat previous yeast infections.   Weight: 148 lbs   Feliciano does not smoke or use smokeless tobacco.   She denies pregnancy and denies breastfeeding. She has menstruated in the past month.   Additional information as reported by the patient (free text): I have had sinus pressure around my cheek bones for the past week or 2. In the past 3 days it has developed into a severe headache on the bridge of my nose and " up slightly above my eyebrows. I have tried taking ibuprofen (which has always relieved my headaches) but it has not helped.     MEDICATIONS: ibuprofen oral, Depo-Provera intramuscular, Humalog U-100 Insulin subcutaneous, ALLERGIES: NKDA  Clinician Response:  Dear Feliciano,  Based on the information provided, you have acute bacterial sinusitis, also known as a sinus infection. Sinus infections are caused by bacteria or a virus and symptoms are almost always identical. The difference between the 2 types of infections is timing.  Sinus infections start as viral infections and symptoms improve on their own in about 7 days. If symptoms have not improved after 7 days or have even worsened, a bacterial infection may have developed.  Medication information  I am prescribing:     Fluticasone 50 mcg/actuation nasal spray. Inhale 2 sprays in each nostril 1 time per day; after 1 week, may adjust to 1 - 2 sprays in each nostril 1 time per day. This medication takes several days to start working, so keep taking it even if it doesn't help right away. There are no refills with this prescription.   Self care  The following tips will keep you as comfortable as possible while you recover:     Rest    Drink plenty of water and other liquids    Take a hot shower to loosen congestion     When to seek care  Please be seen in a clinic or urgent care if any of the following occur:     Symptoms do not start to improve after 3 days of treatment    New symptoms develop, or symptoms become worse      Diagnosis: Acute bacterial sinusitis  Diagnosis ICD: J01.90  Prescription: fluticasone 50 mcg/actuation nasal spray,suspension 1 120 spray aerosol with adapter (grams), 30 days supply. Inhale 2 sprays in each nostril 1 time per day; after 1 week, may adjust to 1 - 2 sprays in each nostril 1 time per day.. Refills: 0, Refill as needed: no, Allow substitutions: yes  Pharmacy: [id number] - [phone] - [address] [place]  [address]

## 2019-10-04 ENCOUNTER — HEALTH MAINTENANCE LETTER (OUTPATIENT)
Age: 24
End: 2019-10-04

## 2019-10-09 DIAGNOSIS — I10 BENIGN ESSENTIAL HYPERTENSION: ICD-10-CM

## 2019-10-09 DIAGNOSIS — E10.9 TYPE 1 DIABETES MELLITUS NOT AT GOAL (H): ICD-10-CM

## 2019-10-10 RX ORDER — LISINOPRIL 5 MG/1
TABLET ORAL
Qty: 90 TABLET | Refills: 2 | Status: SHIPPED | OUTPATIENT
Start: 2019-10-10 | End: 2020-03-05 | Stop reason: DRUGHIGH

## 2019-10-25 ENCOUNTER — ALLIED HEALTH/NURSE VISIT (OUTPATIENT)
Dept: FAMILY MEDICINE | Facility: OTHER | Age: 24
End: 2019-10-25
Payer: COMMERCIAL

## 2019-10-25 DIAGNOSIS — Z23 NEED FOR VACCINATION: Primary | ICD-10-CM

## 2019-10-25 PROCEDURE — 86580 TB INTRADERMAL TEST: CPT

## 2019-10-25 PROCEDURE — 99207 ZZC NO CHARGE NURSE ONLY: CPT

## 2019-10-28 ENCOUNTER — ALLIED HEALTH/NURSE VISIT (OUTPATIENT)
Dept: FAMILY MEDICINE | Facility: OTHER | Age: 24
End: 2019-10-28
Payer: COMMERCIAL

## 2019-10-28 VITALS — SYSTOLIC BLOOD PRESSURE: 138 MMHG | DIASTOLIC BLOOD PRESSURE: 84 MMHG

## 2019-10-28 DIAGNOSIS — Z11.1 SCREENING EXAMINATION FOR PULMONARY TUBERCULOSIS: Primary | ICD-10-CM

## 2019-10-28 DIAGNOSIS — Z30.9 CONTRACEPTIVE MANAGEMENT: Primary | ICD-10-CM

## 2019-10-28 LAB
PPDINDURATION: 0 MM (ref 0–5)
PPDREDNESS: 0 MM

## 2019-10-28 PROCEDURE — 99207 ZZC NO CHARGE NURSE ONLY: CPT

## 2019-10-28 NOTE — PROGRESS NOTES
Mantoux result:  Lab Results   Component Value Date    PPDREDNESS 0 10/28/2019    PPDINDURATIO 0 10/28/2019     Is induration greater than 5mm?  No    Rosalia José, RN, BSN

## 2019-10-28 NOTE — PROGRESS NOTES
Clinic Administered Medication Documentation    MEDICATION LIST:   Depo Provera Documentation    Prior to injection, verified patient identity using patient's name and date of birth. Medication was administered. Please see MAR and medication order for additional information. Patient instructed to report any adverse reaction to staff immediately .    BP: 138/84    LAST PAP/EXAM:   Lab Results   Component Value Date    PAP NIL 02/15/2019     URINE HCG:not indicated    NEXT INJECTION DUE: 1/13/20 - 1/27/20    Was entire vial of medication used? Yes  Vial/Syringe: Single dose vial  Expiration Date:  07/2020

## 2019-10-29 DIAGNOSIS — E10.9 TYPE 1 DIABETES MELLITUS NOT AT GOAL (H): ICD-10-CM

## 2019-12-13 ENCOUNTER — TELEPHONE (OUTPATIENT)
Dept: ENDOCRINOLOGY | Facility: CLINIC | Age: 24
End: 2019-12-13

## 2019-12-13 NOTE — TELEPHONE ENCOUNTER
Writer called [facility] to have CMN faxed to clinic directly.    Will await CMN for completion.    Brandy Rodriguez LPN  Diabetes Clinic Coordinator   Adult Endocrinology and Pediatric Specialty Clinics  [facility]

## 2019-12-13 NOTE — TELEPHONE ENCOUNTER
[facility] Call Center    Phone Message    May a detailed message be left on voicemail: no    Reason for Call: Order(s): Other:   Reason for requested: [facility] pharmacy rep states they received a request for the Dexcom transmitter, , and sensors. She states they are needing clinic notes and a certificate of medical necessity. They had faxed this request on 12/9 and 12/11 to the [facility] [place] clinic. Please fax the requested to [phone].  Date needed: ASAP  Provider name: Dr. De Dios    Action Taken: Message routed to:  Adult Clinics: Endocrinology p [phone]

## 2019-12-26 NOTE — TELEPHONE ENCOUNTER
CMN re-faxed indicating name change.    Brandy Rodriguez LPN  Diabetes Clinic Coordinator   Adult Endocrinology and Pediatric Specialty Clinics  [facility]

## 2020-01-17 ENCOUNTER — DOCUMENTATION ONLY (OUTPATIENT)
Dept: ENDOCRINOLOGY | Facility: CLINIC | Age: 25
End: 2020-01-17

## 2020-01-17 NOTE — PROGRESS NOTES
CMN completed and faxed with chart notes to [facility] [phone].    Brandy Rodriguez LPN  Diabetes Clinic Coordinator   Adult Endocrinology and Pediatric Specialty Clinics  [facility]

## 2020-02-08 ENCOUNTER — HEALTH MAINTENANCE LETTER (OUTPATIENT)
Age: 25
End: 2020-02-08

## 2020-03-02 NOTE — PROGRESS NOTES
SUBJECTIVE:   CC: Feliciano Sahu is an 24 year old woman who presents for preventive health visit.     Healthy Habits:     Getting at least 3 servings of Calcium per day:  Yes    Bi-annual eye exam:  Yes    Dental care twice a year:  NO    Sleep apnea or symptoms of sleep apnea:  None    Diet:  Carbohydrate counting    Frequency of exercise:  2-3 days/week    Duration of exercise:  15-30 minutes    Taking medications regularly:  Yes    Barriers to taking medications:  Not applicable    Medication side effects:  None    PHQ-2 Total Score: 0    Additional concerns today:  Yes      Has CGM now which has been helpful.    Today's PHQ-2 Score:   PHQ-2 ( 1999 [employer]) 3/6/2020   Q1: Little interest or pleasure in doing things 0   Q2: Feeling down, depressed or hopeless 0   PHQ-2 Score 0   Q1: Little interest or pleasure in doing things Not at all   Q2: Feeling down, depressed or hopeless Not at all   PHQ-2 Score 0       Abuse: Current or Past(Physical, Sexual or Emotional)- No  Do you feel safe in your environment? Yes        Social History     Tobacco Use     Smoking status: Never Smoker     Smokeless tobacco: Never Used     Tobacco comment: no smokers in household   Substance Use Topics     Alcohol use: No         Alcohol Use 3/6/2020   Prescreen: >3 drinks/day or >7 drinks/week? No   Prescreen: >3 drinks/day or >7 drinks/week? -       Reviewed orders with patient.  Reviewed health maintenance and updated orders accordingly - Yes  Lab work is in process  Labs reviewed in EPIC  BP Readings from Last 3 Encounters:   03/06/20 132/80   10/28/19 138/84   08/02/19 136/70    Wt Readings from Last 3 Encounters:   03/06/20 63.9 kg (140 lb 12.8 oz)   07/10/19 67.3 kg (148 lb 6.4 oz)   02/14/19 65.7 kg (144 lb 12.8 oz)                  Patient Active Problem List   Diagnosis     Eczema     Sinus tachycardia     CARDIOVASCULAR SCREENING; LDL GOAL LESS THAN 130     Contraception     Uncontrolled type 1 diabetes mellitus with  nephropathy (H)     Type 1 diabetes mellitus not at goal (H)     Past Surgical History:   Procedure Laterality Date     EYE SURGERY      lazy eye correction (strabismus?)       Social History     Tobacco Use     Smoking status: Never Smoker     Smokeless tobacco: Never Used     Tobacco comment: no smokers in household   Substance Use Topics     Alcohol use: No     Family History   Problem Relation Age of Onset     Allergies Mother         seasonal     Thyroid Disease Mother         hypothyridism     Lipids Father      Cancer Paternal Grandfather         skin c/a     Asthma No family hx of      C.A.D. No family hx of      Diabetes No family hx of      Hypertension No family hx of      Cerebrovascular Disease No family hx of      Breast Cancer No family hx of      Cancer - colorectal No family hx of      Prostate Cancer No family hx of      Alcohol/Drug No family hx of      Alzheimer Disease No family hx of      Anesthesia Reaction No family hx of      Arthritis No family hx of      Blood Disease No family hx of      Cardiovascular No family hx of      Circulatory No family hx of      Congenital Anomalies No family hx of      Connective Tissue Disorder No family hx of      Depression No family hx of      Eye Disorder No family hx of      Genetic Disorder No family hx of      Gastrointestinal Disease No family hx of      Genitourinary Problems No family hx of      Gynecology No family hx of      Heart Disease No family hx of      Musculoskeletal Disorder No family hx of      Neurologic Disorder No family hx of      Obesity No family hx of      Osteoporosis No family hx of      Psychotic Disorder No family hx of      Respiratory No family hx of      Hearing Loss No family hx of          Current Outpatient Medications   Medication Sig Dispense Refill     blood glucose monitoring (RACHEL CONTOUR NEXT) test strip Use to test blood sugar 4 times daily or as directed. 100 strip 0     Blood Glucose Monitoring Suppl (BLOOD  GLUCOSE METER) KIT 1 kit 3 times daily. 1 kit 0     Blood Pressure KIT 1 kit daily 1 kit 0     insulin lispro (HUMALOG) 100 UNIT/ML vial USES UP TO 75 UNITS PER DAY IN INSULIN PUMP FOR BASAL,BOLUS AND PRIMING INSULIN PUMP TUBING 80 mL 2     lisinopril (ZESTRIL) 5 MG tablet        medroxyPROGESTERone (DEPO-PROVERA) 150 MG/ML IM injection Inject 1 mL (150 mg) into the muscle every 3 months (Patient not taking: Reported on 3/6/2020) 1 mL 3     Allergies   Allergen Reactions     Nkda [No Known Drug Allergies]      Seasonal Allergies      Spring/Fall     Recent Labs   Lab Test 07/18/19  1612 07/10/19 07/24/18 05/30/18  0848 01/02/18  01/06/15  0959  01/03/13  1030   A1C  --  7.4* 7.4  --  7.8   < > 10.9*   < > 10.4*   LDL 75  --   --  110*  --   --  89   < > 118   HDL 59  --   --  65  --   --   --   --  40*   TRIG 118  --   --  75  --   --   --   --  153*   ALT 34  --   --  35  --   --   --   --   --    CR 0.62  --   --  0.68  --    < > 0.56   < > 0.64   GFRESTIMATED >90  --   --  >90  --    < > >90  Non  GFR Calc     < > >90   GFRESTBLACK >90  --   --  >90  --    < > >90   GFR Calc     < > >90   POTASSIUM 3.8  --   --  4.4  --    < > 4.0  --  4.1   TSH 3.61  --   --  2.04  --   --  1.41  --  2.73    < > = values in this interval not displayed.        Mammogram not appropriate for this patient based on age.    Pertinent mammograms are reviewed under the imaging tab.  History of abnormal Pap smear: NO - age 30-65 PAP every 5 years with negative HPV co-testing recommended  PAP / HPV 2/15/2019 10/14/2016   PAP NIL NIL     Reviewed and updated as needed this visit by clinical staff  Tobacco  Allergies  Meds  Med Hx  Surg Hx  Fam Hx  Soc Hx        Reviewed and updated as needed this visit by Provider        Past Medical History:   Diagnosis Date     Diabetes mellitus (H) 1998    Type 1     Diabetic eye exam (H) 08/09/2013    [facility]      Past Surgical History:   Procedure  "Laterality Date     EYE SURGERY      lazy eye correction (strabismus?)     OB History   No obstetric history on file.       Review of Systems   Constitutional: Negative for chills and fever.   HENT: Positive for congestion. Negative for ear pain, hearing loss and sore throat.    Eyes: Negative for pain and visual disturbance.   Respiratory: Negative for cough and shortness of breath.    Cardiovascular: Negative for chest pain, palpitations and peripheral edema.   Gastrointestinal: Negative for abdominal pain, constipation, diarrhea, heartburn, hematochezia and nausea.   Breasts:  Negative for tenderness, breast mass and discharge.   Genitourinary: Negative for dysuria, frequency, genital sores, hematuria, pelvic pain, urgency, vaginal bleeding and vaginal discharge.   Musculoskeletal: Negative for arthralgias, joint swelling and myalgias.   Skin: Negative for rash.   Neurological: Positive for headaches. Negative for dizziness, weakness and paresthesias.   Psychiatric/Behavioral: Negative for mood changes. The patient is not nervous/anxious.         OBJECTIVE:   /80   Pulse 117   Temp 98.1  F (36.7  C) (Temporal)   Resp 16   Ht 1.56 m (5' 1.42\")   Wt 63.9 kg (140 lb 12.8 oz)   BMI 26.24 kg/m    Physical Exam  GENERAL: healthy, alert and no distress  EYES: Eyes grossly normal to inspection, PERRL and conjunctivae and sclerae normal  HENT: ear canals and TM's normal, nose and mouth without ulcers or lesions  NECK: no adenopathy, no asymmetry, masses, or scars and thyroid normal to palpation  RESP: lungs clear to auscultation - no rales, rhonchi or wheezes  CV: regular rate and rhythm, normal S1 S2, no S3 or S4, no murmur, click or rub, no peripheral edema and peripheral pulses strong  ABDOMEN: soft, nontender, no hepatosplenomegaly, no masses and bowel sounds normal  MS: no gross musculoskeletal defects noted, no edema  SKIN: no suspicious lesions or rashes  NEURO: Normal strength and tone, mentation " "intact and speech normal  PSYCH: mentation appears normal, affect normal/bright    Diagnostic Test Results:  Labs reviewed in Epic  Results for orders placed or performed in visit on 03/06/20 (from the past 24 hour(s))   HCG Qual, Urine ([id number])   Result Value Ref Range    HCG Qual Urine Negative NEG^Negative       ASSESSMENT/PLAN:   1. Routine general medical examination at a health care facility  See notes    2. Uncontrolled hypertension  Stable on lisinopril 5 mg daily which is prescribed by her endocrinologist.    3. Encounter for surveillance of injectable contraceptive  Stable continue current occasion.  - medroxyPROGESTERone (DEPO-PROVERA) injection 150 mg  - HCG Qual, Urine ([id number])    4. 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      COUNSELING:  Reviewed preventive health counseling, as reflected in patient instructions       Regular exercise       Healthy diet/nutrition       Vision screening       Immunizations    Declined: Pneumococcal due to Other is going to think about this               Contraception    Estimated body mass index is 26.24 kg/m  as calculated from the following:    Height as of this encounter: 1.56 m (5' 1.42\").    Weight as of this encounter: 63.9 kg (140 lb 12.8 oz).    Weight management plan: Discussed healthy diet and exercise guidelines     reports that she has never smoked. She has never used smokeless tobacco.      Counseling Resources:  ATP IV Guidelines  Pooled Cohorts Equation Calculator  Breast Cancer Risk Calculator  FRAX Risk Assessment  ICSI Preventive Guidelines  Dietary Guidelines for Americans, 2010  USDA's MyPlate  ASA Prophylaxis  Lung CA Screening    CLEMENCIA [facility]  Answers for HPI/ROS submitted by the patient on 3/6/2020   Annual Exam:  If you checked off any problems, how difficult have these problems made " it for you to do your work, take care of things at home, or get along with other people?: Not difficult at all  PHQ9 TOTAL SCORE: 0  SANDIP 7 TOTAL SCORE: 3

## 2020-03-06 ENCOUNTER — OFFICE VISIT (OUTPATIENT)
Dept: FAMILY MEDICINE | Facility: OTHER | Age: 25
End: 2020-03-06
Payer: COMMERCIAL

## 2020-03-06 VITALS
DIASTOLIC BLOOD PRESSURE: 80 MMHG | RESPIRATION RATE: 16 BRPM | WEIGHT: 140.8 LBS | HEIGHT: 61 IN | TEMPERATURE: 98.1 F | BODY MASS INDEX: 26.58 KG/M2 | HEART RATE: 117 BPM | SYSTOLIC BLOOD PRESSURE: 132 MMHG

## 2020-03-06 DIAGNOSIS — I10 UNCONTROLLED HYPERTENSION: ICD-10-CM

## 2020-03-06 DIAGNOSIS — E10.319 TYPE 1 DIABETES MELLITUS WITH RETINOPATHY WITHOUT MACULAR EDEMA, UNSPECIFIED LATERALITY, UNSPECIFIED RETINOPATHY SEVERITY (H): ICD-10-CM

## 2020-03-06 DIAGNOSIS — Z30.42 ENCOUNTER FOR SURVEILLANCE OF INJECTABLE CONTRACEPTIVE: ICD-10-CM

## 2020-03-06 DIAGNOSIS — Z00.00 ROUTINE GENERAL MEDICAL EXAMINATION AT A HEALTH CARE FACILITY: Primary | ICD-10-CM

## 2020-03-06 LAB — HCG UR QL: NEGATIVE

## 2020-03-06 PROCEDURE — 99395 PREV VISIT EST AGE 18-39: CPT | Mod: 25 | Performed by: STUDENT IN AN ORGANIZED HEALTH CARE EDUCATION/TRAINING PROGRAM

## 2020-03-06 PROCEDURE — 96372 THER/PROPH/DIAG INJ SC/IM: CPT | Performed by: STUDENT IN AN ORGANIZED HEALTH CARE EDUCATION/TRAINING PROGRAM

## 2020-03-06 PROCEDURE — 81025 URINE PREGNANCY TEST: CPT | Performed by: STUDENT IN AN ORGANIZED HEALTH CARE EDUCATION/TRAINING PROGRAM

## 2020-03-06 RX ORDER — LISINOPRIL 10 MG/1
10 TABLET ORAL DAILY
Qty: 30 TABLET | Refills: 0 | Status: CANCELLED | OUTPATIENT
Start: 2020-03-06

## 2020-03-06 RX ORDER — LISINOPRIL 5 MG/1
TABLET ORAL
COMMUNITY
Start: 2020-01-14 | End: 2020-06-18

## 2020-03-06 RX ORDER — MEDROXYPROGESTERONE ACETATE 150 MG/ML
150 INJECTION, SUSPENSION INTRAMUSCULAR
Status: ACTIVE | OUTPATIENT
Start: 2020-03-06

## 2020-03-06 RX ADMIN — MEDROXYPROGESTERONE ACETATE 150 MG: 150 INJECTION, SUSPENSION INTRAMUSCULAR at 13:51

## 2020-03-06 ASSESSMENT — PATIENT HEALTH QUESTIONNAIRE - PHQ9
SUM OF ALL RESPONSES TO PHQ QUESTIONS 1-9: 0
SUM OF ALL RESPONSES TO PHQ QUESTIONS 1-9: 0
10. IF YOU CHECKED OFF ANY PROBLEMS, HOW DIFFICULT HAVE THESE PROBLEMS MADE IT FOR YOU TO DO YOUR WORK, TAKE CARE OF THINGS AT HOME, OR GET ALONG WITH OTHER PEOPLE: NOT DIFFICULT AT ALL

## 2020-03-06 ASSESSMENT — ENCOUNTER SYMPTOMS
SHORTNESS OF BREATH: 0
FEVER: 0
ARTHRALGIAS: 0
HEMATOCHEZIA: 0
ABDOMINAL PAIN: 0
WEAKNESS: 0
NAUSEA: 0
CHILLS: 0
PALPITATIONS: 0
HEADACHES: 1
COUGH: 0
EYE PAIN: 0
HEMATURIA: 0
CONSTIPATION: 0
HEARTBURN: 0
MYALGIAS: 0
FREQUENCY: 0
DYSURIA: 0
SORE THROAT: 0
NERVOUS/ANXIOUS: 0
DIARRHEA: 0
DIZZINESS: 0
PARESTHESIAS: 0
JOINT SWELLING: 0
BREAST MASS: 0

## 2020-03-06 ASSESSMENT — ANXIETY QUESTIONNAIRES
GAD7 TOTAL SCORE: 3
6. BECOMING EASILY ANNOYED OR IRRITABLE: SEVERAL DAYS
GAD7 TOTAL SCORE: 3
7. FEELING AFRAID AS IF SOMETHING AWFUL MIGHT HAPPEN: NOT AT ALL
3. WORRYING TOO MUCH ABOUT DIFFERENT THINGS: NOT AT ALL
1. FEELING NERVOUS, ANXIOUS, OR ON EDGE: SEVERAL DAYS
GAD7 TOTAL SCORE: 3
5. BEING SO RESTLESS THAT IT IS HARD TO SIT STILL: SEVERAL DAYS
2. NOT BEING ABLE TO STOP OR CONTROL WORRYING: NOT AT ALL
7. FEELING AFRAID AS IF SOMETHING AWFUL MIGHT HAPPEN: NOT AT ALL
4. TROUBLE RELAXING: NOT AT ALL

## 2020-03-06 ASSESSMENT — MIFFLIN-ST. JEOR: SCORE: 1322.65

## 2020-03-06 NOTE — NURSING NOTE
Clinic Administered Medication Documentation      Depo Provera Documentation    URINE HCG: negative    Depo-Provera Standing Order inclusion/exclusion criteria reviewed.   Patient meets: inclusion criteria     BP: 132/80  LAST PAP/EXAM:   Lab Results   Component Value Date    PAP NIL 02/15/2019       Prior to injection, verified patient identity using patient's name and date of birth. Medication was administered. Please see MAR and medication order for additional information.     Was entire vial of medication used? Yes  Vial/Syringe: Single dose vial  Expiration Date:  10/2020    Patient instructed to remain in clinic for 15 minutes and report any adverse reaction to staff immediately .  NEXT INJECTION DUE: 5/22/20 - 6/5/20    Pradeep Smith SMA

## 2020-03-07 ASSESSMENT — PATIENT HEALTH QUESTIONNAIRE - PHQ9: SUM OF ALL RESPONSES TO PHQ QUESTIONS 1-9: 0

## 2020-03-07 ASSESSMENT — ANXIETY QUESTIONNAIRES: GAD7 TOTAL SCORE: 3

## 2020-03-31 ENCOUNTER — TELEPHONE (OUTPATIENT)
Dept: ENDOCRINOLOGY | Facility: CLINIC | Age: 25
End: 2020-03-31

## 2020-03-31 ENCOUNTER — MEDICAL CORRESPONDENCE (OUTPATIENT)
Dept: HEALTH INFORMATION MANAGEMENT | Facility: CLINIC | Age: 25
End: 2020-03-31

## 2020-03-31 NOTE — TELEPHONE ENCOUNTER
[employer] prescription for refills faxed to [phone].    Anna Black RN, BSN, CDE   [facility]

## 2020-04-27 NOTE — PATIENT INSTRUCTIONS
Basal rate changes:  12 midnight: 1.45   2 AM 1.5   6 AM 1.45   1 PM 1.5   6 PM 1.45         Please call to schedule morning, fasting labs.      Sending blood sugars to your provider at [facility]:  We want to help you with your diabetes management, which often requires frequent adjustments to your therapy. For your convenience, we have several ways to send your blood sugars to your doctor for review.    - Send message directly to your doctor through My Chart.  Please ask the rooming staff if you would like to sign up for My Chart.  This is a fast and confidential way to send your information and communicate directly with your provider.   - Record readings and fax to [phone].  We have a template for you to use for your convenience.  - Stop by the clinic with your meter for download if advised by provider.   - My Chart or call Anna Black, Diabetes Educator at [phone]  - Call the clinic and speak to one of the endocrine nurses to relay information on the telephone.  Brandy Gomes, or Gabriela at [phone].   -    Please call the on-call Endocrinologist at the [facility] for after       hours/weekend needs at [phone] Option #4.    Please note that you do not need to FAST if you are just having an A1C drawn. Please remember to ALWAYS bring your glucose meter with to your appointment. This data is very important for the management of your care.    Thank you!  Your [facility] Diabetes Care Team         lmovr patient has not been seen by dr alvarenga yet,needs to make a virtual visit

## 2020-06-05 ENCOUNTER — ALLIED HEALTH/NURSE VISIT (OUTPATIENT)
Dept: FAMILY MEDICINE | Facility: CLINIC | Age: 25
End: 2020-06-05
Payer: COMMERCIAL

## 2020-06-05 VITALS — SYSTOLIC BLOOD PRESSURE: 120 MMHG | DIASTOLIC BLOOD PRESSURE: 82 MMHG | WEIGHT: 145.38 LBS | BODY MASS INDEX: 27.1 KG/M2

## 2020-06-05 DIAGNOSIS — Z30.42 ENCOUNTER FOR SURVEILLANCE OF INJECTABLE CONTRACEPTIVE: Primary | ICD-10-CM

## 2020-06-05 PROCEDURE — 96372 THER/PROPH/DIAG INJ SC/IM: CPT

## 2020-06-05 RX ADMIN — MEDROXYPROGESTERONE ACETATE 150 MG: 150 INJECTION, SUSPENSION INTRAMUSCULAR at 11:25

## 2020-06-05 NOTE — PROGRESS NOTES
Clinic Administered Medication Documentation      Depo Provera Documentation    URINE HCG: not indicated    Depo-Provera Standing Order inclusion/exclusion criteria reviewed.   Patient meets: inclusion criteria     BP: 120/82  LAST PAP/EXAM:   Lab Results   Component Value Date    PAP NIL 02/15/2019       Prior to injection, verified patient identity using patient's name and date of birth. Medication was administered. Please see MAR and medication order for additional information.     Was entire vial of medication used? Yes  Vial/Syringe: Single dose vial  Expiration Date:  03/2021    Patient instructed to remain in clinic for 15 minutes and report any adverse reaction to staff immediately .  NEXT INJECTION DUE: 8/21/20 - 9/4/20      Was given to patient in LD per her request. Only does arm and her RD has a continuous blood glucose monitor on it at this time.     Kathleen Walker MA

## 2020-07-15 ENCOUNTER — VIRTUAL VISIT (OUTPATIENT)
Dept: ENDOCRINOLOGY | Facility: CLINIC | Age: 25
End: 2020-07-15
Payer: COMMERCIAL

## 2020-07-15 DIAGNOSIS — E04.9 GOITER: ICD-10-CM

## 2020-07-15 DIAGNOSIS — E10.9 TYPE 1 DIABETES MELLITUS NOT AT GOAL (H): Primary | ICD-10-CM

## 2020-07-15 PROCEDURE — 95251 CONT GLUC MNTR ANALYSIS I&R: CPT | Performed by: INTERNAL MEDICINE

## 2020-07-15 PROCEDURE — 99214 OFFICE O/P EST MOD 30 MIN: CPT | Mod: 95 | Performed by: INTERNAL MEDICINE

## 2020-07-15 RX ORDER — IBUPROFEN 600 MG/1
TABLET ORAL
Qty: 1 EACH | Refills: 3 | Status: SHIPPED | OUTPATIENT
Start: 2020-07-15

## 2020-07-15 NOTE — PROGRESS NOTES
Video-Visit Details    Type of service:  Video Visit  Start: 07/15/2020 10:30 am   Stop: 07/15/2020 10:54 am    Originating Location (pt. Location): Home  Distant Location (provider location):  [facility]   Platform used for Video Visit: Maryann    Due to the COVID 19 pandemic this visit was converted to a video visit in order to help prevent spread of infection in this patient and the general population.     The patient is seen in f/up for evaluation of diabetes. She was last seen in the clinic in 7/2019.    Feliciano Arreguin is a 25 year old female diagnosed with type 1 diabetes in 2008, when she was found to be in DKA. Hemoglobin A1c used to be between 9.3 and 10.9 and it significantly decreased since 2016, to 8-8.5%. Most recent A1c was 7.4% at her last visit here.    Her diabetes is known to be complicated by intermittent microalbuminuria.  She was started on a DEXCOM sensor in the beginning of 2017.    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  Her meal schedule has changed.  She now has breakfast around 10 or 11 AM (generally 1-2 slices of toast and eggs).  At 6:30 PM, she has lunch, which frequently consists of chicken, potatoes, noodles, beans.  At 11 PM, she has a snack (frequently popcorn).    Last time she checked her blood pressure at home it was 128 over 80s.    Insulin pump (Minimed 530G - 2015) settings:  Basal rate at 12 midnight 1.45 units per hour, at 1 PM 1.4 units per hour  Insulin to carb ratio 1 U per 7 grams  Sensitivity 30   Target 90 - 120   Active insulin time 3 hrs     Insulin pump settings revealed that she checks her blood glucose 4 times daily.  Average blood glucose is 201 constipation for 5.  The insulin dose is 50 units, of which 67% is basal insulin.  Her morning blood sugar is quite variable, as well as the bedtime blood sugar.  During daytime, between 9 AM and 10 PM, her blood sugar is consistently elevated.  The Dexcom readings  reveal an average blood glucose of 190, with a standard deviation of 47, corresponding to a GMI of 7.9%.  Only 38% of the glucose numbers are within target of .        Diabetes complications:  Retinopathy: last eye exam 12/2018; mild DR   Nephropathy: h/o proteinuria dating back to 2010  Neuropathy: no numbness or tingling sensation     Feels shaky, sweaty and dizzy when BG drops in the 80s. Lowest BG she remembers was 35. She denies prior episodes of loss of consciousness due to hypoglycemia.  She doesn't have glucagon at home.   She had one episode of diabetes ketoacidosis in 1999.    Past Medical History:   Diagnosis Date     Diabetes mellitus (H) 1998    Type 1     Diabetic eye exam (H) 08/09/2013    [facility]   Treated with Depo-Provera since, approximately, 2014    Past Surgical History:   Procedure Laterality Date     EYE SURGERY      lazy eye correction (strabismus?)     Current Medications   On depoprovera since 2014 (pt preferred not to have MP)       Current Outpatient Medications:      blood glucose monitoring (RACHEL CONTOUR NEXT) test strip, Use to test blood sugar 4 times daily or as directed., Disp: 100 strip, Rfl: 0     Blood Glucose Monitoring Suppl (BLOOD GLUCOSE METER) KIT, 1 kit 3 times daily., Disp: 1 kit, Rfl: 0     Blood Pressure KIT, 1 kit daily, Disp: 1 kit, Rfl: 0     insulin lispro (HUMALOG) 100 UNIT/ML vial, USES UP TO 75 UNITS PER DAY IN INSULIN PUMP FOR BASAL,BOLUS AND PRIMING INSULIN PUMP TUBING, Disp: 80 mL, Rfl: 2     lisinopril (ZESTRIL) 5 MG tablet, Take 1 tablet (5 mg) by mouth daily, Disp: 90 tablet, Rfl: 3     medroxyPROGESTERone (DEPO-PROVERA) 150 MG/ML IM injection, Inject 1 mL (150 mg) into the muscle every 3 months (Patient not taking: Reported on 3/6/2020), Disp: 1 mL, Rfl: 3    Current Facility-Administered Medications:      medroxyPROGESTERone (DEPO-PROVERA) injection 150 mg, 150 mg, Intramuscular, Q90 Days, Cecilia Banks APRN CNP, 150 mg at  06/05/20 1125    Family History   Mother has hypothyroidism. Father has hypercholesterolemia. Grandfather melanoma. No known family history of hypertension, death at a young age, stroke.     Social History  Single. No children. She denies smoking, drinking alcohol or using illicit drugs. Occupation: nurse at [employer].     Review of Systems   Systemic:               Fatigue mainly in the morning - for the last 4 months; sleeps well at night   Eye:                       No eye symptoms   Anmol-Laryngeal:      No anmol-laryngeal symptoms, no dysphagia, no voice hoarseness, no cough      Breast:                   No breast symptoms  Cardiovascular:     No cardiovascular symptoms, no CP or palpitations   Pulmonary:            No pulmonary symptoms, no cough or SOB    Gastrointestinal:    No gastrointestinal symptoms, no diarrhea or constipation   Genitourinary:        No genitourinary symptoms    Endocrine:             No endocrine symptoms, no heat or cold intolerance, no increased sweating, no easy bruising      Neurological:         rare headaches, no tremor, no dizziness     Musculoskeletal:   No joint pain or muscle cramps   Skin:                      No skin symptoms   Psychological:       Gets anxious easily           Vital Signs     Previous Weights:    Wt Readings from Last 10 Encounters:   06/05/20 65.9 kg (145 lb 6 oz)   03/06/20 63.9 kg (140 lb 12.8 oz)   07/10/19 67.3 kg (148 lb 6.4 oz)   02/14/19 65.7 kg (144 lb 12.8 oz)   07/24/18 66.2 kg (145 lb 15.1 oz)   05/18/18 62.7 kg (138 lb 3.2 oz)   12/08/17 64.5 kg (142 lb 1.6 oz)   11/07/17 64.6 kg (142 lb 8 oz)   09/21/17 63 kg (139 lb)   04/04/17 62.6 kg (138 lb)     Vital Signs:  There were no vitals taken for this visit.    Physical Exam  General Appearance: alert, no distress noted   Eyes: grossly normal to inspection, conjunctivae and sclerae normal, no lid lag or stare   Respiratory: no audible wheeze, cough, or visible cyanosis.  No  visible retractions or increased work of breathing.  Able to speak fully in complete sentences.  Neurological: Cranial nerves grossly intact, mentation intact and speech normal; no tremor of the outstretched hands   Skin: no lesions on exposed skin   Psychological: mentation appears normal, affect normal, judgement and insight intact, normal speech and appearance well-groomed    Lab Results  I reviewed prior lab results documented in Epic.  Lab Results   Component Value Date    A1C 7.4 (A) 07/10/2019    A1C 7.4 07/24/2018    A1C 7.8 01/02/2018    A1C 8.1 07/26/2016    A1C 8.6 (A) 04/05/2016       Hemoglobin   Date Value Ref Range Status   09/22/2008 16.0 (H) 11.7 - 15.7 g/dL Final     Hematocrit   Date Value Ref Range Status   07/18/2019 45.4 35.0 - 47.0 % Final     Cholesterol   Date Value Ref Range Status   07/18/2019 158 <200 mg/dL Final     Cholesterol/HDL Ratio   Date Value Ref Range Status   01/03/2013 5.0 0.0 - 5.0 Final     HDL Cholesterol   Date Value Ref Range Status   07/18/2019 59 >49 mg/dL Final     LDL Cholesterol Calculated   Date Value Ref Range Status   07/18/2019 75 <100 mg/dL Final     Comment:     Desirable:       <100 mg/dl     VLDL-Cholesterol   Date Value Ref Range Status   01/03/2013 31 (H) 0 - 30 mg/dL Final     Triglycerides   Date Value Ref Range Status   07/18/2019 118 <150 mg/dL Final     Albumin Urine mg/L   Date Value Ref Range Status   07/18/2019 19 mg/L Final     TSH   Date Value Ref Range Status   07/18/2019 3.61 0.40 - 4.00 mU/L Final         Last Basic Metabolic Panel:    Sodium   Date Value Ref Range Status   07/18/2019 136 133 - 144 mmol/L Final     Potassium   Date Value Ref Range Status   07/18/2019 3.8 3.4 - 5.3 mmol/L Final     Chloride   Date Value Ref Range Status   07/18/2019 104 94 - 109 mmol/L Final     Calcium   Date Value Ref Range Status   07/18/2019 9.1 8.5 - 10.1 mg/dL Final     Carbon Dioxide   Date Value Ref Range Status   07/18/2019 23 20 - 32 mmol/L Final      Urea Nitrogen   Date Value Ref Range Status   07/18/2019 12 7 - 30 mg/dL Final     Creatinine   Date Value Ref Range Status   07/18/2019 0.62 0.52 - 1.04 mg/dL Final     GFR Estimate   Date Value Ref Range Status   07/18/2019 >90 >60 mL/min/[1.73_m2] Final     Comment:     Non  GFR Calc  Starting 12/18/2018, serum creatinine based estimated GFR (eGFR) will be   calculated using the Chronic Kidney Disease Epidemiology Collaboration   (CKD-EPI) equation.       Glucose   Date Value Ref Range Status   07/18/2019 165 (H) 70 - 99 mg/dL Final       AST   Date Value Ref Range Status   07/18/2019 16 0 - 45 U/L Final     ALT   Date Value Ref Range Status   07/18/2019 34 0 - 50 U/L Final     Albumin   Date Value Ref Range Status   07/18/2019 4.1 3.4 - 5.0 g/dL Final       Assessment     1. Type 1 diabetes, not at goal, complicated by mild diabetic retinopathy and intermittent microalbuminuria.   The patient continues to rely on the basal rate and under bolus.  I think fear of hypoglycemia has been the main barrier in improving her glucose control.  Long-term, I think she is going to benefit from the use of a closed-loop insulin pump and, given the fact that she already has the Dexcom sensor, I recommended to upgrade to tandem T slim X2 insulin pump.  I have counseled her on the use of the pump, the control IQ feature, which is going to help her prevent both hypo-and hyperglycemic episodes.  Recommendations:  Bolus for all meals and snacks, according to the carbohydrate intake  Document carbohydrate intake and insulin pump records  Change insulin to carbohydrate ratio to 1 unit per 6 g carbohydrates around the clock  Get familiar with the tandem insulin pump by reviewing the information available on Tandem site and, also, by downloading the demo primo on her smart phone. If, after doing this, she still has questions regarding the pump, she should schedule an appointment with the diabetes educator, we can also  help her with the application process.  Prescription for glucagon refilled  Follow-up testing lab work.     2.  Mild goiter on prior clinical exam  Follow-up reflex TSH.    Orders Placed This Encounter   Procedures     Continuous Glucose Monitoring >=72 hours PHYS INTERP     Albumin Random Urine Quantitative with Creat Ratio     Comprehensive metabolic panel     Hematocrit     Hemoglobin A1c     Lipid panel reflex to direct LDL Fasting     TSH with free T4 reflex

## 2020-07-15 NOTE — LETTER
7/15/2020         RE: Feliciano Sahu  [address]  [place] [address]        Dear Colleague,    Thank you for referring your patient, Feliciano Sahu, to the [facility]. Please see a copy of my visit note below.    Video-Visit Details    Type of service:  Video Visit  Start: 07/15/2020 10:30 am   Stop: 07/15/2020 10:54 am    Originating Location (pt. Location): Home  Distant Location (provider location):  [facility]   Platform used for Video Visit: AmWell    Due to the COVID 19 pandemic this visit was converted to a video visit in order to help prevent spread of infection in this patient and the general population.     The patient is seen in f/up for evaluation of diabetes. She was last seen in the clinic in 7/2019.    Feliciano Arreguin is a 25 year old female diagnosed with type 1 diabetes in 2008, when she was found to be in DKA. Hemoglobin A1c used to be between 9.3 and 10.9 and it significantly decreased since 2016, to 8-8.5%. Most recent A1c was 7.4% at her last visit here.    Her diabetes is known to be complicated by intermittent microalbuminuria.  She was started on a DEXCOM sensor in the beginning of 2017.    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  Her meal schedule has changed.  She now has breakfast around 10 or 11 AM (generally 1-2 slices of toast and eggs).  At 6:30 PM, she has lunch, which frequently consists of chicken, potatoes, noodles, beans.  At 11 PM, she has a snack (frequently popcorn).    Last time she checked her blood pressure at home it was 128 over 80s.    Insulin pump (Minimed 530G - 2015) settings:  Basal rate at 12 midnight 1.45 units per hour, at 1 PM 1.4 units per hour  Insulin to carb ratio 1 U per 7 grams  Sensitivity 30   Target 90 - 120   Active insulin time 3 hrs     Insulin pump settings revealed that she checks her blood glucose 4 times daily.  Average blood glucose is 201 constipation for 5.   The insulin dose is 50 units, of which 67% is basal insulin.  Her morning blood sugar is quite variable, as well as the bedtime blood sugar.  During daytime, between 9 AM and 10 PM, her blood sugar is consistently elevated.  The Dexcom readings reveal an average blood glucose of 190, with a standard deviation of 47, corresponding to a GMI of 7.9%.  Only 38% of the glucose numbers are within target of .        Diabetes complications:  Retinopathy: last eye exam 12/2018; mild DR   Nephropathy: h/o proteinuria dating back to 2010  Neuropathy: no numbness or tingling sensation     Feels shaky, sweaty and dizzy when BG drops in the 80s. Lowest BG she remembers was 35. She denies prior episodes of loss of consciousness due to hypoglycemia.  She doesn't have glucagon at home.   She had one episode of diabetes ketoacidosis in 1999.    Past Medical History:   Diagnosis Date     Diabetes mellitus (H) 1998    Type 1     Diabetic eye exam (H) 08/09/2013    [facility]   Treated with Depo-Provera since, approximately, 2014    Past Surgical History:   Procedure Laterality Date     EYE SURGERY      lazy eye correction (strabismus?)     Current Medications   On depoprovera since 2014 (pt preferred not to have MP)       Current Outpatient Medications:      blood glucose monitoring (RACHEL CONTOUR NEXT) test strip, Use to test blood sugar 4 times daily or as directed., Disp: 100 strip, Rfl: 0     Blood Glucose Monitoring Suppl (BLOOD GLUCOSE METER) KIT, 1 kit 3 times daily., Disp: 1 kit, Rfl: 0     Blood Pressure KIT, 1 kit daily, Disp: 1 kit, Rfl: 0     insulin lispro (HUMALOG) 100 UNIT/ML vial, USES UP TO 75 UNITS PER DAY IN INSULIN PUMP FOR BASAL,BOLUS AND PRIMING INSULIN PUMP TUBING, Disp: 80 mL, Rfl: 2     lisinopril (ZESTRIL) 5 MG tablet, Take 1 tablet (5 mg) by mouth daily, Disp: 90 tablet, Rfl: 3     medroxyPROGESTERone (DEPO-PROVERA) 150 MG/ML IM injection, Inject 1 mL (150 mg) into the muscle every 3 months  (Patient not taking: Reported on 3/6/2020), Disp: 1 mL, Rfl: 3    Current Facility-Administered Medications:      medroxyPROGESTERone (DEPO-PROVERA) injection 150 mg, 150 mg, Intramuscular, Q90 Days, Cecilia Banks, APRN CNP, 150 mg at 06/05/20 1125    Family History   Mother has hypothyroidism. Father has hypercholesterolemia. Grandfather melanoma. No known family history of hypertension, death at a young age, stroke.     Social History  Single. No children. She denies smoking, drinking alcohol or using illicit drugs. Occupation: nurse at [employer].     Review of Systems   Systemic:               Fatigue mainly in the morning - for the last 4 months; sleeps well at night   Eye:                       No eye symptoms   Anmol-Laryngeal:      No anmol-laryngeal symptoms, no dysphagia, no voice hoarseness, no cough      Breast:                   No breast symptoms  Cardiovascular:     No cardiovascular symptoms, no CP or palpitations   Pulmonary:            No pulmonary symptoms, no cough or SOB    Gastrointestinal:    No gastrointestinal symptoms, no diarrhea or constipation   Genitourinary:        No genitourinary symptoms    Endocrine:             No endocrine symptoms, no heat or cold intolerance, no increased sweating, no easy bruising      Neurological:         rare headaches, no tremor, no dizziness     Musculoskeletal:   No joint pain or muscle cramps   Skin:                      No skin symptoms   Psychological:       Gets anxious easily           Vital Signs     Previous Weights:    Wt Readings from Last 10 Encounters:   06/05/20 65.9 kg (145 lb 6 oz)   03/06/20 63.9 kg (140 lb 12.8 oz)   07/10/19 67.3 kg (148 lb 6.4 oz)   02/14/19 65.7 kg (144 lb 12.8 oz)   07/24/18 66.2 kg (145 lb 15.1 oz)   05/18/18 62.7 kg (138 lb 3.2 oz)   12/08/17 64.5 kg (142 lb 1.6 oz)   11/07/17 64.6 kg (142 lb 8 oz)   09/21/17 63 kg (139 lb)   04/04/17 62.6 kg (138 lb)     Vital Signs:  There were no  vitals taken for this visit.    Physical Exam  General Appearance: alert, no distress noted   Eyes: grossly normal to inspection, conjunctivae and sclerae normal, no lid lag or stare   Respiratory: no audible wheeze, cough, or visible cyanosis.  No visible retractions or increased work of breathing.  Able to speak fully in complete sentences.  Neurological: Cranial nerves grossly intact, mentation intact and speech normal; no tremor of the outstretched hands   Skin: no lesions on exposed skin   Psychological: mentation appears normal, affect normal, judgement and insight intact, normal speech and appearance well-groomed    Lab Results  I reviewed prior lab results documented in Epic.  Lab Results   Component Value Date    A1C 7.4 (A) 07/10/2019    A1C 7.4 07/24/2018    A1C 7.8 01/02/2018    A1C 8.1 07/26/2016    A1C 8.6 (A) 04/05/2016       Hemoglobin   Date Value Ref Range Status   09/22/2008 16.0 (H) 11.7 - 15.7 g/dL Final     Hematocrit   Date Value Ref Range Status   07/18/2019 45.4 35.0 - 47.0 % Final     Cholesterol   Date Value Ref Range Status   07/18/2019 158 <200 mg/dL Final     Cholesterol/HDL Ratio   Date Value Ref Range Status   01/03/2013 5.0 0.0 - 5.0 Final     HDL Cholesterol   Date Value Ref Range Status   07/18/2019 59 >49 mg/dL Final     LDL Cholesterol Calculated   Date Value Ref Range Status   07/18/2019 75 <100 mg/dL Final     Comment:     Desirable:       <100 mg/dl     VLDL-Cholesterol   Date Value Ref Range Status   01/03/2013 31 (H) 0 - 30 mg/dL Final     Triglycerides   Date Value Ref Range Status   07/18/2019 118 <150 mg/dL Final     Albumin Urine mg/L   Date Value Ref Range Status   07/18/2019 19 mg/L Final     TSH   Date Value Ref Range Status   07/18/2019 3.61 0.40 - 4.00 mU/L Final         Last Basic Metabolic Panel:    Sodium   Date Value Ref Range Status   07/18/2019 136 133 - 144 mmol/L Final     Potassium   Date Value Ref Range Status   07/18/2019 3.8 3.4 - 5.3 mmol/L Final      Chloride   Date Value Ref Range Status   07/18/2019 104 94 - 109 mmol/L Final     Calcium   Date Value Ref Range Status   07/18/2019 9.1 8.5 - 10.1 mg/dL Final     Carbon Dioxide   Date Value Ref Range Status   07/18/2019 23 20 - 32 mmol/L Final     Urea Nitrogen   Date Value Ref Range Status   07/18/2019 12 7 - 30 mg/dL Final     Creatinine   Date Value Ref Range Status   07/18/2019 0.62 0.52 - 1.04 mg/dL Final     GFR Estimate   Date Value Ref Range Status   07/18/2019 >90 >60 mL/min/[1.73_m2] Final     Comment:     Non  GFR Calc  Starting 12/18/2018, serum creatinine based estimated GFR (eGFR) will be   calculated using the Chronic Kidney Disease Epidemiology Collaboration   (CKD-EPI) equation.       Glucose   Date Value Ref Range Status   07/18/2019 165 (H) 70 - 99 mg/dL Final       AST   Date Value Ref Range Status   07/18/2019 16 0 - 45 U/L Final     ALT   Date Value Ref Range Status   07/18/2019 34 0 - 50 U/L Final     Albumin   Date Value Ref Range Status   07/18/2019 4.1 3.4 - 5.0 g/dL Final       Assessment     1. Type 1 diabetes, not at goal, complicated by mild diabetic retinopathy and intermittent microalbuminuria.   The patient continues to rely on the basal rate and under bolus.  I think fear of hypoglycemia has been the main barrier in improving her glucose control.  Long-term, I think she is going to benefit from the use of a closed-loop insulin pump and, given the fact that she already has the Dexcom sensor, I recommended to upgrade to tandem T slim X2 insulin pump.  I have counseled her on the use of the pump, the control IQ feature, which is going to help her prevent both hypo-and hyperglycemic episodes.  Recommendations:  Bolus for all meals and snacks, according to the carbohydrate intake  Document carbohydrate intake and insulin pump records  Change insulin to carbohydrate ratio to 1 unit per 6 g carbohydrates around the clock  Get familiar with the tandem insulin pump by  "reviewing the information available on Tandem site and, also, by downloading the demo primo on her smart phone. If, after doing this, she still has questions regarding the pump, she should schedule an appointment with the diabetes educator, we can also help her with the application process.  Prescription for glucagon refilled  Follow-up testing lab work.     2.  Mild goiter on prior clinical exam  Follow-up reflex TSH.    Orders Placed This Encounter   Procedures     Continuous Glucose Monitoring >=72 hours PHYS INTERP     Albumin Random Urine Quantitative with Creat Ratio     Comprehensive metabolic panel     Hematocrit     Hemoglobin A1c     Lipid panel reflex to direct LDL Fasting     TSH with free T4 reflex              Feliciano Sahu is a 25 year old female who is being evaluated via a billable video visit.      The patient has been notified of following:     \"This video visit will be conducted via a call between you and your physician/provider. We have found that certain health care needs can be provided without the need for an in-person physical exam.  This service lets us provide the care you need with a video conversation.  If a prescription is necessary we can send it directly to your pharmacy.  If lab work is needed we can place an order for that and you can then stop by our lab to have the test done at a later time.    Video visits are billed at different rates depending on your insurance coverage.  Please reach out to your insurance provider with any questions.    If during the course of the call the physician/provider feels a video visit is not appropriate, you will not be charged for this service.\"    Patient has given verbal consent for Video visit? Yes  How would you like to obtain your AVS? Nick  Patient would like the video invitation sent by: Nick   Will anyone else be joining your video visit? No            Again, thank you for allowing me to participate in the care of your patient.  "       Sincerely,        Eda De Dios MD

## 2020-07-15 NOTE — PROGRESS NOTES
"Feliciano Sahu is a 25 year old female who is being evaluated via a billable video visit.      The patient has been notified of following:     \"This video visit will be conducted via a call between you and your physician/provider. We have found that certain health care needs can be provided without the need for an in-person physical exam.  This service lets us provide the care you need with a video conversation.  If a prescription is necessary we can send it directly to your pharmacy.  If lab work is needed we can place an order for that and you can then stop by our lab to have the test done at a later time.    Video visits are billed at different rates depending on your insurance coverage.  Please reach out to your insurance provider with any questions.    If during the course of the call the physician/provider feels a video visit is not appropriate, you will not be charged for this service.\"    Patient has given verbal consent for Video visit? Yes  How would you like to obtain your AVS? Nick  Patient would like the video invitation sent by: Nick   Will anyone else be joining your video visit? No          "

## 2020-07-16 ENCOUNTER — TELEPHONE (OUTPATIENT)
Dept: ENDOCRINOLOGY | Facility: CLINIC | Age: 25
End: 2020-07-16

## 2020-07-16 RX ORDER — NAPROXEN SODIUM 220 MG
TABLET ORAL
Qty: 50 EACH | Refills: 1 | Status: SHIPPED | OUTPATIENT
Start: 2020-07-16

## 2020-07-16 NOTE — TELEPHONE ENCOUNTER
Refill completed per Verbal Order from Dr. De Dios.    Brandy Rodriguez LPN  Diabetes Clinic Coordinator   [facility]  [facility]

## 2020-07-20 ENCOUNTER — DOCUMENTATION ONLY (OUTPATIENT)
Dept: ENDOCRINOLOGY | Facility: CLINIC | Age: 25
End: 2020-07-20

## 2020-07-20 NOTE — PROGRESS NOTES
SMN and Prescription order completed and faxed to [phone] with chart notes for pump supplies.    Brandy Rodriguez LPN  Diabetes Clinic Coordinator   [facility]  [facility]

## 2020-08-28 ENCOUNTER — OFFICE VISIT (OUTPATIENT)
Dept: FAMILY MEDICINE | Facility: CLINIC | Age: 25
End: 2020-08-28
Payer: COMMERCIAL

## 2020-08-28 VITALS
TEMPERATURE: 99.1 F | OXYGEN SATURATION: 99 % | HEART RATE: 118 BPM | RESPIRATION RATE: 16 BRPM | DIASTOLIC BLOOD PRESSURE: 68 MMHG | BODY MASS INDEX: 27.77 KG/M2 | SYSTOLIC BLOOD PRESSURE: 148 MMHG | WEIGHT: 149 LBS

## 2020-08-28 DIAGNOSIS — I10 ESSENTIAL HYPERTENSION: ICD-10-CM

## 2020-08-28 DIAGNOSIS — R00.0 SINUS TACHYCARDIA: ICD-10-CM

## 2020-08-28 DIAGNOSIS — J32.0 CHRONIC MAXILLARY SINUSITIS: Primary | ICD-10-CM

## 2020-08-28 DIAGNOSIS — R01.1 HEART MURMUR: ICD-10-CM

## 2020-08-28 DIAGNOSIS — Z78.9 USES DEPO-PROVERA AS PRIMARY BIRTH CONTROL METHOD: ICD-10-CM

## 2020-08-28 PROCEDURE — 93000 ELECTROCARDIOGRAM COMPLETE: CPT | Performed by: FAMILY MEDICINE

## 2020-08-28 PROCEDURE — 99214 OFFICE O/P EST MOD 30 MIN: CPT | Performed by: FAMILY MEDICINE

## 2020-08-28 RX ORDER — CETIRIZINE HYDROCHLORIDE 10 MG/1
10 TABLET ORAL DAILY PRN
Qty: 90 TABLET | Refills: 1 | Status: SHIPPED | OUTPATIENT
Start: 2020-08-28 | End: 2023-04-27

## 2020-08-28 RX ORDER — METOPROLOL SUCCINATE 25 MG/1
12.5 TABLET, EXTENDED RELEASE ORAL DAILY
Qty: 30 TABLET | Refills: 0 | Status: SHIPPED | OUTPATIENT
Start: 2020-08-28 | End: 2020-10-29

## 2020-08-28 RX ORDER — FLUTICASONE PROPIONATE 50 MCG
1 SPRAY, SUSPENSION (ML) NASAL DAILY
Qty: 1 ML | Refills: 11 | Status: SHIPPED | OUTPATIENT
Start: 2020-08-28 | End: 2021-02-11

## 2020-08-28 NOTE — PROGRESS NOTES
Subjective     Feliciano Sahu is a 25 year old female who presents to clinic today for the following health issues:    HPI       Acute Illness  Acute illness concerns: sinus pain  Onset/Duration: ongoing problem  Symptoms:  Fever: no  Chills/Sweats: no  Headache (location?): YES  Sinus Pressure: YES  Conjunctivitis:  no  Ear Pain: no  Rhinorrhea: YES  Congestion: YES  Sore Throat: no  Cough: no  Wheeze: no  Decreased Appetite: no  Nausea: no  Vomiting: no  Diarrhea: no  Dysuria/Freq.: no  Dysuria or Hematuria: no  Fatigue/Achiness: no  Sick/Strep Exposure: no  Therapies tried and outcome: None    Feliciano is here today for couple concerns.    1.  First concerns about sinus infection.  She been having the sinus pain on her face bilaterally that comes and goes in the last year but has been worse the last couple weeks.  Also has the runny nose and nasal congestion with sinus headache.  Feels a lot of postnasal drainage.  No drainage sneezing however.  No fever or chills.  No coughing.  Was on the nasal spray for a while and it helped - ran out for several months.  Has not taken OTC med for this.    2.  Also needs a Depo-Provera injection.  Uses it for birth control and it has been working well with no side effect.  She is within the window.  Denies of STD risk.    3.  She is known to have diabetes type 1 and high blood pressure.  Her DM is controlled with the insulin pump which is working well.  She takes lisinopril 5 mg daily for her high blood pressure.  Not checking blood pressure at home and forget to take her med this morning.  No dizziness.  No chest pain or shortness of breath.  No orthopnea or dyspnea.  No leg swelling.    Review of Systems   Constitutional, HEENT, cardiovascular, pulmonary, gi and gu systems are negative, except as otherwise noted.      Objective    BP (!) 148/68   Pulse 118   Temp 99.1  F (37.3  C) (Temporal)   Resp 16   Wt 67.6 kg (149 lb)   SpO2 99%   BMI 27.77 kg/m    Body mass index  is 27.77 kg/m .  Physical Exam   GENERAL: healthy, alert and no distress  EYES: Eyes grossly normal to inspection, PERRL and conjunctivae and sclerae normal.  No conjunctival injection.  HENT: ear canals and TM's normal. Nares are congested with clear drainage with pale nasal mucosa. Oropharynx is pink and moist. Tender with palpation to the sinuses - both maxillary and ethmoid sinuses   NECK: no adenopathy, no asymmetry, masses, or scars and thyroid normal to palpation  RESP: lungs clear to auscultation - no rales, rhonchi or wheezes  CV:  sinus tachycardia with a heart rate about 120s, 2/6 systolic murmur loudest at the apex, no peripheral edema and peripheral pulses strong  ABDOMEN: soft, nontender, nondistended, no palpable masses organomegaly with no bowel sounds.  MS: no gross musculoskeletal defects noted, no edema.  No focal weakness.  NEURO: Normal strength and tone, mentation intact and speech normal.  No focal neurological deficit.    PSYCH: mentation appears normal, affect normal/bright    No results found for any visits on 08/28/20.  EKG - sinus tachycardia, normal axis, normal intervals, no acute ST/T changes c/w ischemia, no LVH by voltage criteria        Assessment & Plan     Chronic maxillary sinusitis  Discussed with her about the nature condition.  Started her on Augmentin and instructed to take it as prescribed.  Notify  us if develop diarrhea.  Also start Zyrtec and Flonase.  She was taught how to use the nasal spray correctly.  If continued to have recurrent sinusitis or if not getting any better, will consider sinus CT scan.    - amoxicillin-clavulanate (AUGMENTIN) 875-125 MG tablet; Take 1 tablet by mouth 2 times daily  - cetirizine (ZYRTEC) 10 MG tablet; Take 1 tablet (10 mg) by mouth daily as needed for allergies  - fluticasone (FLONASE) 50 MCG/ACT nasal spray; Spray 1 spray into both nostrils daily    Essential hypertension  Blood pressure is high with tachycardia today.  She takes  "lisinopril 5 mg daily but forgot to take her med this morning.  She is on good and reliable birth control method.   Medical record reviewed which indicates of tachycardia in the last several visits with on and off elevated blood pressure.  Discussed with her about the importance of controlling her blood pressure.  Instructed her to work with her primary care provider to consider stopping the ACE inhibitor once she is trying to conceive.  Emphasized on healthy diet, daily exercise and weight loss.  Encouraged to avoid high caffeine/sugar intake.  Encouraged to take the lisinopril as prescribed daily.  Adding low-dose metoprolol which will help to bring his blood pressure and the heart rate down.  Potential side effect discussed.    Heart murmur  Reviewed the medical record and there was no indication of her having heart murmur in the past.  Most likely it is benign in nature.  Sent for echocardiogram.    - EKG 12-lead complete w/read - Clinics    Sinus tachycardia  Please see #2 above for further details.  Added metoprolol 12.5 mg daily.  Again, potential side effect discussed.  Encouraged to avoid high caffeine intake.    - metoprolol succinate ER (TOPROL-XL) 25 MG 24 hr tablet; Take 0.5 tablets (12.5 mg) by mouth daily    Uses Depo-Provera as primary birth control method  She received her Depo-Provera today.  She has been on that for a while with no side effect.  She is within her window period.  Will need to monitor osteoporosis if she uses more than 5 years in a row.        BMI:   Estimated body mass index is 27.77 kg/m  as calculated from the following:    Height as of 3/6/20: 1.56 m (5' 1.42\").    Weight as of this encounter: 67.6 kg (149 lb).   Weight management plan: Discussed healthy diet and exercise guidelines      Return in about 1 month (around 9/28/2020) for dm follow up.    Felicia Larry Mai, MD  [facility]    "

## 2020-08-28 NOTE — NURSING NOTE
Clinic Administered Medication Documentation      Depo Provera Documentation    URINE HCG: not indicated    Depo-Provera Standing Order inclusion/exclusion criteria reviewed.   Patient meets: inclusion criteria     BP: 148/68  LAST PAP/EXAM:   Lab Results   Component Value Date    PAP NIL 02/15/2019       Prior to injection, verified patient identity using patient's name and date of birth. Medication was administered. Please see MAR and medication order for additional information.     Was entire vial of medication used? Yes  Vial/Syringe: Single dose vial  Expiration Date:  02/2022    Patient instructed to remain in clinic for 15 minutes and report any adverse reaction to staff immediately .  NEXT INJECTION DUE: 11/13/20 - 11/27/20

## 2020-08-31 ENCOUNTER — TELEPHONE (OUTPATIENT)
Dept: FAMILY MEDICINE | Facility: CLINIC | Age: 25
End: 2020-08-31

## 2020-08-31 DIAGNOSIS — R01.1 HEART MURMUR: Primary | ICD-10-CM

## 2020-08-31 NOTE — TELEPHONE ENCOUNTER
Felicia Murray MD [facility]               Please help to set up for echocardiogram - new heart murmur.

## 2020-09-17 ENCOUNTER — VIRTUAL VISIT (OUTPATIENT)
Dept: FAMILY MEDICINE | Facility: OTHER | Age: 25
End: 2020-09-17
Payer: COMMERCIAL

## 2020-09-17 PROCEDURE — 99421 OL DIG E/M SVC 5-10 MIN: CPT | Performed by: PHYSICIAN ASSISTANT

## 2020-09-17 NOTE — PROGRESS NOTES
"Date: 2020 12:35:12  Clinician: Satish Morrow  Clinician NPI: [id number]  Patient: Feliciano Sahu  Patient : 1995  Patient Address: [address] [place] [address]  Patient Phone: [phone]  Visit Protocol: Yeast infection  Patient Summary:  Feliciano is a 25 year old ( : 1995 ) female who initiated a [facility] Visit for a presumed vaginal yeast infection. When asked the question \"Please sign me up to receive news, health information and promotions from [facility].\", Feliciano responded \"Yes\".    Feliciano began noticing vaginal pruritus, vaginal discharge, vaginal irritation, and vaginal burning 7-14 days ago.   Symptom details   Vaginal discharge: She has a more than normal amount of white, chunky (like cottage cheese) discharge. The discharge does not have a fishy smell.    She denies having blisters, open sores, and abdominal pain. She also denies feeling feverish.   She has attempted to treat her symptoms with anti-fungal cream for yeast infections and anti-fungal suppositories for yeast infections. Anti-fungal medication used to treat symptoms as reported by the patient (free text): Monistat 1 day, used twice. Vaginal irritation decreased 1 day after use, but returned 2 days after use.    Precipitating events  Feliciano denies having a sexually transmitted disease.   Pertinent medical history  Feliciano has a history of vaginal yeast infections. She has had one (1) occurrence in the past year and the current symptoms are similar to previous yeast infections. She is not sure if she has used fluconazole (Diflucan) to treat previous yeast infections.    She has no medication preference. She is currently taking or has taken antibiotics in the past 2 weeks.   She does NOT smoke or use smokeless tobacco.   She denies pregnancy and denies breastfeeding. She has menstruated in the past month.      MEDICATIONS: lisinopril oral, Depo-Provera intramuscular, Humalog U-100 Insulin subcutaneous, ibuprofen oral, " ALLERGIES: NKDA  Clinician Response:  Dear Feliciano,  Based on the information you have provided, you likely have a vaginal yeast infection which is a common infection of the vagina caused by a fungus. Yeast are a type of fungus.  The most common symptom of a yeast infection is itching in and around the vagina. Other signs and symptoms include burning, redness, or a thick, white vaginal discharge that looks like cottage cheese and does not have a bad smell.  Medication information  I am prescribing:     Fluconazole (Diflucan) 150 mg oral tablet. Take 1 tablet by mouth in a single dose. There are no refills with this prescription.   Self care  Steps you can take to prevent symptoms of future vaginal yeast infection:     Avoid irritants such as scented bath products, tampons, pads, or vaginal sprays and powders    Avoid douching    Wear cotton underwear and if you are comfortable doing so, do not wear underwear to bed    Avoid hot tubs and whirlpool spas     When to seek care  Most women notice improvement in their symptoms within 1-2 days after starting treatment with complete clearing in 5-7 days.  Please make an appointment to be seen in a clinic or urgent care if any of the following occur:     Your symptoms have not improved in 3 days or not resolved in 10 days    You develop new symptoms or your symptoms become worse    If you think you may be at risk for an STD      Diagnosis: Candida vulvovaginitis  Diagnosis ICD: B37.3  Prescription: fluconazole (Diflucan) 150 mg oral tablet 1 tablet, 1 days supply. Take 1 tablet by mouth in a single dose. Refills: 0, Refill as needed: no, Allow substitutions: yes  Pharmacy: [facility] - [phone] - [address] [place] [address]

## 2020-09-26 DIAGNOSIS — B37.31 YEAST INFECTION OF THE VAGINA: ICD-10-CM

## 2020-09-29 RX ORDER — FLUCONAZOLE 150 MG/1
150 TABLET ORAL
Qty: 4 TABLET | Refills: 0 | Status: SHIPPED | OUTPATIENT
Start: 2020-09-29 | End: 2021-02-11

## 2020-10-07 DIAGNOSIS — E10.9 TYPE 1 DIABETES MELLITUS NOT AT GOAL (H): ICD-10-CM

## 2020-10-07 NOTE — TELEPHONE ENCOUNTER
insulin lispro (HUMALOG) 100 UNIT/ML vial        Last Written Prescription Date:  10/29/19  Last Fill Quantity: 80mL,   # refills: 2  Last Office Visit : 07/15/20  Future Office visit:  11/11/20    Routing refill request to provider for review/approval because:  Insulin - refilled per clinic

## 2020-10-28 DIAGNOSIS — R00.0 SINUS TACHYCARDIA: ICD-10-CM

## 2020-10-28 NOTE — LETTER
October 30, 2020      Feliciano Sahu  [id number] [facility] [id number]        Dear Feliciano,     We would like you to come in on the float schedule for a blood pressure check and pulse. Please call the clinic to make an appt.       Sincerely,        Felicia Larry Mai, MD

## 2020-10-28 NOTE — TELEPHONE ENCOUNTER
Routing refill request to provider for review/approval because:  Elevated blood pressures.     Jacinta Templeton RN

## 2020-10-29 RX ORDER — METOPROLOL SUCCINATE 25 MG/1
TABLET, EXTENDED RELEASE ORAL
Qty: 30 TABLET | Refills: 0 | Status: SHIPPED | OUTPATIENT
Start: 2020-10-29 | End: 2020-12-30

## 2020-11-08 ENCOUNTER — HEALTH MAINTENANCE LETTER (OUTPATIENT)
Age: 25
End: 2020-11-08

## 2020-11-10 DIAGNOSIS — E10.9 TYPE 1 DIABETES MELLITUS NOT AT GOAL (H): ICD-10-CM

## 2020-11-10 DIAGNOSIS — E04.9 GOITER: ICD-10-CM

## 2020-11-10 LAB
ALBUMIN SERPL-MCNC: 3.9 G/DL (ref 3.4–5)
ALP SERPL-CCNC: 69 U/L (ref 40–150)
ALT SERPL W P-5'-P-CCNC: 53 U/L (ref 0–50)
ANION GAP SERPL CALCULATED.3IONS-SCNC: 8 MMOL/L (ref 3–14)
AST SERPL W P-5'-P-CCNC: 27 U/L (ref 0–45)
BILIRUB SERPL-MCNC: 0.4 MG/DL (ref 0.2–1.3)
BUN SERPL-MCNC: 10 MG/DL (ref 7–30)
CALCIUM SERPL-MCNC: 9 MG/DL (ref 8.5–10.1)
CHLORIDE SERPL-SCNC: 108 MMOL/L (ref 94–109)
CHOLEST SERPL-MCNC: 152 MG/DL
CO2 SERPL-SCNC: 21 MMOL/L (ref 20–32)
CREAT SERPL-MCNC: 0.59 MG/DL (ref 0.52–1.04)
CREAT UR-MCNC: 192 MG/DL
GFR SERPL CREATININE-BSD FRML MDRD: >90 ML/MIN/{1.73_M2}
GLUCOSE SERPL-MCNC: 182 MG/DL (ref 70–99)
HBA1C MFR BLD: 7.4 % (ref 0–5.6)
HCT VFR BLD AUTO: 43.7 % (ref 35–47)
HDLC SERPL-MCNC: 46 MG/DL
LDLC SERPL CALC-MCNC: 71 MG/DL
MICROALBUMIN UR-MCNC: 104 MG/L
MICROALBUMIN/CREAT UR: 54.17 MG/G CR (ref 0–25)
NONHDLC SERPL-MCNC: 106 MG/DL
POTASSIUM SERPL-SCNC: 4.3 MMOL/L (ref 3.4–5.3)
PROT SERPL-MCNC: 7.9 G/DL (ref 6.8–8.8)
SODIUM SERPL-SCNC: 137 MMOL/L (ref 133–144)
TRIGL SERPL-MCNC: 177 MG/DL
TSH SERPL DL<=0.005 MIU/L-ACNC: 3.27 MU/L (ref 0.4–4)

## 2020-11-10 PROCEDURE — 85014 HEMATOCRIT: CPT | Performed by: INTERNAL MEDICINE

## 2020-11-10 PROCEDURE — 84443 ASSAY THYROID STIM HORMONE: CPT | Performed by: INTERNAL MEDICINE

## 2020-11-10 PROCEDURE — 80053 COMPREHEN METABOLIC PANEL: CPT | Performed by: INTERNAL MEDICINE

## 2020-11-10 PROCEDURE — 36415 COLL VENOUS BLD VENIPUNCTURE: CPT | Performed by: INTERNAL MEDICINE

## 2020-11-10 PROCEDURE — 82043 UR ALBUMIN QUANTITATIVE: CPT | Performed by: INTERNAL MEDICINE

## 2020-11-10 PROCEDURE — 83036 HEMOGLOBIN GLYCOSYLATED A1C: CPT | Performed by: INTERNAL MEDICINE

## 2020-11-10 PROCEDURE — 80061 LIPID PANEL: CPT | Performed by: INTERNAL MEDICINE

## 2020-11-25 ENCOUNTER — ALLIED HEALTH/NURSE VISIT (OUTPATIENT)
Dept: FAMILY MEDICINE | Facility: CLINIC | Age: 25
End: 2020-11-25
Payer: COMMERCIAL

## 2020-11-25 VITALS
SYSTOLIC BLOOD PRESSURE: 134 MMHG | HEART RATE: 92 BPM | DIASTOLIC BLOOD PRESSURE: 82 MMHG | BODY MASS INDEX: 28.05 KG/M2 | WEIGHT: 150.5 LBS

## 2020-11-25 DIAGNOSIS — Z30.42 ENCOUNTER FOR SURVEILLANCE OF INJECTABLE CONTRACEPTIVE: Primary | ICD-10-CM

## 2020-11-25 PROCEDURE — 96372 THER/PROPH/DIAG INJ SC/IM: CPT

## 2020-11-25 PROCEDURE — 99207 PR NO CHARGE NURSE ONLY: CPT

## 2020-11-25 RX ADMIN — MEDROXYPROGESTERONE ACETATE 150 MG: 150 INJECTION, SUSPENSION INTRAMUSCULAR at 11:30

## 2020-11-25 NOTE — PROGRESS NOTES
Clinic Administered Medication Documentation      Depo Provera Documentation      URINE HCG: not indicated    Depo-Provera Standing Order inclusion/exclusion criteria reviewed.   Patient meets: inclusion criteria     BP: 134/82  LAST PAP/EXAM:   Lab Results   Component Value Date    PAP NIL 02/15/2019       Prior to injection, verified patient identity using patient's name and date of birth. Medication was administered. Please see MAR and medication order for additional information.     Was entire vial of medication used? Yes  Vial/Syringe: Single dose vial  Expiration Date:  03/2022    Patient was last seen on 8/28/2020 for visit and had her depo in RD then. Window for that nest dose was 11/13/-11/27. Patient is within her window.     Patient instructed to remain in clinic for 15 minutes and report any adverse reaction to staff immediately .  NEXT INJECTION DUE: 2/10/21 - 2/24/21    Depo was given today on 11/25/2020 in LD.     Kathleen Walker MA

## 2021-01-01 ENCOUNTER — TRANSFERRED RECORDS (OUTPATIENT)
Dept: MULTI SPECIALTY CLINIC | Facility: CLINIC | Age: 26
End: 2021-01-01

## 2021-01-01 LAB — RETINOPATHY: NORMAL

## 2021-01-06 ENCOUNTER — OFFICE VISIT (OUTPATIENT)
Dept: OBGYN | Facility: CLINIC | Age: 26
End: 2021-01-06
Payer: COMMERCIAL

## 2021-01-06 VITALS
BODY MASS INDEX: 28.33 KG/M2 | SYSTOLIC BLOOD PRESSURE: 150 MMHG | DIASTOLIC BLOOD PRESSURE: 70 MMHG | OXYGEN SATURATION: 97 % | HEART RATE: 128 BPM | WEIGHT: 152 LBS | TEMPERATURE: 98.5 F

## 2021-01-06 DIAGNOSIS — Z31.69 ENCOUNTER FOR PRECONCEPTION CONSULTATION: Primary | ICD-10-CM

## 2021-01-06 PROCEDURE — 99203 OFFICE O/P NEW LOW 30 MIN: CPT | Performed by: OBSTETRICS & GYNECOLOGY

## 2021-01-06 NOTE — PROGRESS NOTES
Clinic Note    CC:    Chief Complaint   Patient presents with     Consult     Fertility      HPI:  Feliciano Sahu is a 25 year old  woman with history of type 1 diabetes, last A1c 7.4, has a pump, also chronic HTN, on two meds (lisinopril and metoprolol), who presents for preconception counseling. Otherwise healthy young woman in good state of health. No prior pregnancies.      with vasectomy, being seen for possible reversal.     No family hx of birth defects nor genetic disease on either side of the family.     Ob Hx:    Gyn Hx: No LMP recorded (lmp unknown). Patient has had an injection.  Menses suppressed   Last pap NIL ()   STI history none   Using Depo for birth control  PMH:   Past Medical History:   Diagnosis Date     Diabetes mellitus (H)     Type 1     Diabetic eye exam (H) 2013    [facility]     SurgHx:   Past Surgical History:   Procedure Laterality Date     EYE SURGERY      lazy eye correction (strabismus?)     FamHx:   Family History   Problem Relation Age of Onset     Allergies Mother         seasonal     Thyroid Disease Mother         hypothyridism     Lipids Father      Cancer Paternal Grandfather         skin c/a     Asthma No family hx of      C.A.D. No family hx of      Diabetes No family hx of      Hypertension No family hx of      Cerebrovascular Disease No family hx of      Breast Cancer No family hx of      Cancer - colorectal No family hx of      Prostate Cancer No family hx of      Alcohol/Drug No family hx of      Alzheimer Disease No family hx of      Anesthesia Reaction No family hx of      Arthritis No family hx of      Blood Disease No family hx of      Cardiovascular No family hx of      Circulatory No family hx of      Congenital Anomalies No family hx of      Connective Tissue Disorder No family hx of      Depression No family hx of      Eye Disorder No family hx of      Genetic Disorder No family hx of      Gastrointestinal Disease No  family hx of      Genitourinary Problems No family hx of      Gynecology No family hx of      Heart Disease No family hx of      Musculoskeletal Disorder No family hx of      Neurologic Disorder No family hx of      Obesity No family hx of      Osteoporosis No family hx of      Psychotic Disorder No family hx of      Respiratory No family hx of      Hearing Loss No family hx of      SocHx:   Social History     Socioeconomic History     Marital status:      Spouse name: None     Number of children: None     Years of education: None     Highest education level: None   Occupational History     None   Social Needs     Financial resource strain: None     Food insecurity     Worry: None     Inability: None     Transportation needs     Medical: None     Non-medical: None   Tobacco Use     Smoking status: Never Smoker     Smokeless tobacco: Never Used     Tobacco comment: no smokers in household   Substance and Sexual Activity     Alcohol use: No     Drug use: No     Sexual activity: Yes     Partners: Male     Birth control/protection: Injection   Lifestyle     Physical activity     Days per week: None     Minutes per session: None     Stress: None   Relationships     Social connections     Talks on phone: None     Gets together: None     Attends [employer] service: None     Active member of club or organization: None     Attends meetings of clubs or organizations: None     Relationship status: None     Intimate partner violence     Fear of current or ex partner: None     Emotionally abused: None     Physically abused: None     Forced sexual activity: None   Other Topics Concern     Parent/sibling w/ CABG, MI or angioplasty before 65F 55M? No   Social History Narrative     None     Allergies:   Nkda [no known drug allergies]  Current Medications:   Current Outpatient Medications   Medication Sig Dispense Refill     blood glucose monitoring (RACHEL CONTOUR NEXT) test strip Use to test blood sugar 4 times daily or as  "directed. 100 strip 0     Blood Glucose Monitoring Suppl (BLOOD GLUCOSE METER) KIT 1 kit 3 times daily. 1 kit 0     cetirizine (ZYRTEC) 10 MG tablet Take 1 tablet (10 mg) by mouth daily as needed for allergies 90 tablet 1     glucagon (GLUCAGON EMERGENCY) 1 MG kit Inject 1 mg subcutaneously or intramuscularly. 1 each 3     insulin lispro (HUMALOG) 100 UNIT/ML vial USES UP TO 75 UNITS PER DAY IN INSULIN PUMP FOR BASAL,BOLUS AND PRIMING INSULIN PUMP TUBING 80 mL 2     insulin syringe 31G X 5/16\" 0.5 ML MISC Use 4-6 times daily in the event of pump failure 50 each 1     lisinopril (ZESTRIL) 5 MG tablet Take 1 tablet (5 mg) by mouth daily 90 tablet 3     medroxyPROGESTERone (DEPO-PROVERA) 150 MG/ML IM injection Inject 1 mL (150 mg) into the muscle every 3 months 1 mL 3     metoprolol succinate ER (TOPROL-XL) 25 MG 24 hr tablet Take 1/2 (one-half) tablet by mouth once daily 45 tablet 0     amoxicillin-clavulanate (AUGMENTIN) 875-125 MG tablet Take 1 tablet by mouth 2 times daily 10 tablet 0     Blood Pressure KIT 1 kit daily (Patient not taking: Reported on 1/6/2021) 1 kit 0     fluconazole (DIFLUCAN) 150 MG tablet Take 1 tablet (150 mg) by mouth every 3 days 4 tablet 0     fluticasone (FLONASE) 50 MCG/ACT nasal spray Spray 1 spray into both nostrils daily 1 mL 11     ROS: 10 point ROS negative other than as noted in the HPI    EXAM:  Vitals:    01/06/21 1430   BP: (!) 150/70   BP Location: Right arm   Patient Position: Chair   Cuff Size: Adult Regular   Pulse: 128   Temp: 98.5  F (36.9  C)   TempSrc: Temporal   SpO2: 97%   Weight: 68.9 kg (152 lb)    Body mass index is 28.33 kg/m .    Gen: Alert, oriented, appropriately interactive, NAD  CV: RRR, 2+ peripheral pulses  Resp: CTAB, good effort without distress   Abdomen: soft, non tender, non distended, no masses  MSK: normal gait, symmetric movements UE & LE  Lower extremities: non-tender, no edema    Labs & Imaging:  No results found for this or any previous visit (from " the past 24 hour(s)).    Lab Results   Component Value Date    PAP NIL 02/15/2019    PAP NIL 10/14/2016       ASSESSMENT/PLAN: Feliciano Sahu is a 25 year old  woman with history of type 1 diabetes, last A1c 7.4, has a pump, also chronic HTN, on two meds (lisinopril and metoprolol), who presents for preconception counseling. Otherwise healthy young woman in good state of health.     Long discussion today about risks of pre-gestational diabetes in pregnancy, would need to optimize control, will be followed by endocrine closely during pregnancy as well as eye exams each trimester.   Extensive discussion of maternal and fetal risks of diabetes in pregnancy. In the setting of poorly controlled diabetes, fetal risks may include increased risk for miscarriage and congenital malformations, fetal macrosomia with associated shoulder dystocia, respiratory distress syndrome,  hypoglycemia and hyperbilirubinemia, intrauterine fetal demise, and  mortality. Maternal risks include heightened risk for preeclampsia,  section secondary to fetal macrosomia, poor wound healing, wound infections and long-term complications including retinopathy and blindness, nephropathy, renal disease, and CAD.  We discussed that many of these risks can be reduced with tight glycemic control with insulin management and maintaining a hemoglobin A1c to < 6%.  Information was given to the patient.   Regarding her HTN, discussed she would need to come off of her Lisinopril. Need for third trimester fetal surveillance. Need for early term delivery, possibly earlier.   We discussed with patient the risks of chronic hypertension, particularly risks of inadequate blood pressure control and associated impact on maternal and fetal morbidity and mortality.  These risks include, but are not limited to increased risk of subsequent development of fetal growth restriction, superimposed preeclampsia and associated need for early  delivery and risks to a  .     Will Ramires MD   2021 2:46 PM

## 2021-01-15 ENCOUNTER — VIRTUAL VISIT (OUTPATIENT)
Dept: FAMILY MEDICINE | Facility: OTHER | Age: 26
End: 2021-01-15
Payer: COMMERCIAL

## 2021-01-15 PROCEDURE — 99421 OL DIG E/M SVC 5-10 MIN: CPT | Performed by: PHYSICIAN ASSISTANT

## 2021-01-16 NOTE — PROGRESS NOTES
"Date: 01/15/2021 18:04:50  Clinician: Robyn Chacon  Clinician NPI: [id number]  Patient: Feliciano Sahu  Patient : 1995  Patient Address: [address] [place] [address]  Patient Phone: [phone]  Visit Protocol: Quit tobacco  Patient Summary:  Feliciano is a 25 year old (: 1995 ) female who initiated a [facility] Visit for tobacco cessation assistance.  When asked the question \"Please sign me up to receive news, health information and promotions from [facility].\", Feliciano responded \"Yes\".    She has not used [facility] for tobacco cessation treatment before. Feliciano is considering quitting tobacco in the next 2 to 4 weeks and has chosen 2021 as a quit date. She uses the following form(s) of tobacco: cigarettes.    Smoking Fagerstrom Scoring:   The patient:     Smokes for the first time after 60 minutes of waking up     Can refrain from smoking in places where it is forbidden     Prefers to give up any cigar, cigarette, or pipe other than the first in the morning     Smokes 10 or fewer times per day     Smokes more frequently during the first hours after waking than during the rest of the day     Smokes even if ill and in bed the rest of the day      Previous Quit Attempts  Feliciano began using tobacco when she was under 18 years old and has used it regularly for 5-10 years. She has tried to quit 3--5 times.   During her most recent quit attempt, she was able to stop smoking for approximately 1 week or less. Feliciano claims to have resumed smoking primarily because of stress.   She has used the following methods during past quit attempts:      Nicotine gum    Quitting cold turkey    Nicotine patch    Quitting gradually    Web/internet based program     She used nicotine replacement products for 2 weeks and does not believe nicotine replacement made it easier to quit. She experienced the following side effects: skin irritation, rapid heartbeat, nausea, and dizziness.   Pertinent medical history  Feliciano reports " she has not been treated in the past for bipolar disorder, anxiety, and depression.   Feliciano denies having asthma and COPD/emphysema.   She did not recently stop using sedatives like sleep aids or muscle relaxers, takes over the counter stimulants, and drinks an average of 8 to 14 servings of alcohol each week.    The patient denies having asthma, tape or adhesive allergy, recent heart attack, angina, cardiac arrhythmias and heart failure.   She denies pregnancy and denies breastfeeding. She does not menstruate.   Feliciano weighs 150 pounds.    Treatment Preferences  Feliciano is most interested in medication and text or email reminders to help quit tobacco. If she were to receive a recommendation or prescription for tobacco cessation medication, she would prefer Chantix and Zyban. She is not interested in classes or education and support groups.   Weight: 150 lbs    MEDICATIONS: lisinopril oral, Depo-Provera intramuscular, Humalog U-100 Insulin subcutaneous, ibuprofen oral, ALLERGIES: NKDA  Clinician Response:  Dear Feliciano,  Based on the information you provided, I can suggest several options to help you quit tobacco.  The most important thing you can do is to commit to your quit date. You decided to quit tobacco on 02/20/2021.  Before your quit date:     Get support from your family, friends, and coworkers; tell them that you are quitting. Studies show that emotional support is crucial to your success. A great way to monitor your progress and receive support from your friends and family is to use the QuitPal Anuel. You can read more about this free smart phone anuel on Smokefree.gov.    Understand that this is not going to be easy. The critical first few weeks may be the most difficult. You will experience nicotine withdrawal and you may simply miss your routine of using tobacco.    Research shows that support from someone trained to help you quit smoking can increase your chances for success. 1-800-QUIT-NOW (1-399.272.4066)  provides a free phone-based support program which you can use along with your treatment plan.      Smokefree.gov has a great sms program that can help you stay on track.     On your quit date:     Remove all tobacco products including lighters or anything that may trigger thoughts of tobacco use.    Don't allow anyone else to smoke in your house. Let them know that you are serious about quitting, and their use of tobacco will make it difficult for you to be successful.     TOTAL ABSTINENCE IS ABSOLUTELY NECESSARY.  Medication information  Unless you are allergic to the over-the-counter medication(s) below, I recommend using:      Nicotine gum (2mg) to help you quit. Nicotine gum is a chewing gum that has nicotine in it that reduces withdrawal symptoms and cravings to help you gradually quit.   This is an over-the-counter medication that does not require a prescription. If you have health insurance, check with your insurance provider to see if they will help to pay for this medication.  Nicotine gum is not chewed like regular gum. Chew each piece until it produces a tingling feeling in your mouth and then leave it between your cheek and gum. After 5 minutes, chew it again several times and leave it between your cheek and gum tissue again. This process helps to get the nicotine into the bloodstream. Discard the gum after 30 minutes. Follow the prescription instructions for further information regarding the strength of nicotine gum and the number of pieces per day recommended for you. Do not drink coffee, juice, and other acidic beverages or the nicotine gum will not work very well.  Common side effects from nicotine gum include:     Mouth sores    Sore jaw    Sore muscles    Dizziness    Headache    Stomach ache     Most side effects, such as stomach aches, are caused by not chewing the nicotine gum correctly.      Nicotine patches (beginning with 14mg/day) to help you quit. These are small adhesive patches, like a  Band-Aid, that you attach to your skin every day. The patch releases a steady amount of nicotine all day long to help lessen withdrawal symptoms and control cravings. The nicotine in the patch takes the place of nicotine from cigarettes or other forms of tobacco.   This is an over-the-counter medication that does not require a prescription. If you have health insurance, check with your insurance provider to see if they will help to pay for this medication.  The nicotine patch works best if you can use it for 8 weeks. Place a new patch on a part of your body between your neck and waist. Put the patch on a new spot each day to lessen skin irritation. Use 14 mg/day patches for 6 weeks followed by 7 mg/day for two weeks.  Common side effects may include:     Headaches    Redness or swelling of the skin where you place the patch    Dizziness/lightheadedness    Drowsiness    Nausea     Some people have vivid dreams when using the patch. Removing the patch 1 or 2 hours before you go to sleep may help with this side effect.   Nicotine patches may not be right for you if you:     Have skin problems    Are allergic or sensitive to adhesive tape    Have heart problems, chest pain, or uncontrolled high blood pressure    Are pregnant, plan to become pregnant, or are breastfeeding      Self care  While you are quitting:     Exercise every day. Exercise helps relieve the stress of smoking cessation, increasing the chance of your success. You don't need to wear spandex and join a club, even a brisk 30-minute walk will do.    Remember that a healthy diet and regular exercise can help you avoid the small weight gain many smokers experience when they quit. Do not start a strict diet at this time - focusing on quitting at this time improves chances for success.    Simple distractions can help. Squeeze a rubber ball or play with a rubber band. Keep gum or hard candy close by. These distractions can help relieve the stress of nicotine  withdrawal and occupy your hands and mouth.    Drinking alcohol is strongly associated with relapse. Avoid alcohol and the places where you would be tempted to drink, especially in the early weeks of quitting tobacco.      Stick to your plan and any follow-up appointments.        The following websites offer great information and options for additional support:     The American Lung Association    The American Cancer Society    A step by step quit guide    The Centers for Disease Control and Prevention    What Works to Quit    Quit Smoking      Diagnosis: Tobacco dependence  Diagnosis ICD: F17.200

## 2021-02-03 ENCOUNTER — TELEPHONE (OUTPATIENT)
Dept: ENDOCRINOLOGY | Facility: CLINIC | Age: 26
End: 2021-02-03

## 2021-02-03 DIAGNOSIS — E10.9 TYPE 1 DIABETES MELLITUS (H): Primary | ICD-10-CM

## 2021-02-03 RX ORDER — PROCHLORPERAZINE 25 MG/1
SUPPOSITORY RECTAL
Qty: 9 EACH | Refills: 3 | Status: SHIPPED | OUTPATIENT
Start: 2021-02-03 | End: 2021-12-15

## 2021-02-03 RX ORDER — PROCHLORPERAZINE 25 MG/1
SUPPOSITORY RECTAL
Qty: 1 EACH | Refills: 3 | Status: SHIPPED | OUTPATIENT
Start: 2021-02-03 | End: 2021-12-15

## 2021-02-03 NOTE — TELEPHONE ENCOUNTER
Please send refill Rx's for Dexcom G6 sensors and transmitter    Please call and speak to one of our Durable Medical Equipment Team members if you have any questions- [phone]    Thank you!  Donna Paulino CPhT  [facility]

## 2021-02-11 DIAGNOSIS — Z30.011 ENCOUNTER FOR INITIAL PRESCRIPTION OF CONTRACEPTIVE PILLS: Primary | ICD-10-CM

## 2021-02-11 RX ORDER — LEVONORGESTREL / ETHINYL ESTRADIOL AND ETHINYL ESTRADIOL 150-30(84)
1 KIT ORAL DAILY
Qty: 91 TABLET | Refills: 7 | Status: SHIPPED | OUTPATIENT
Start: 2021-02-11 | End: 2022-11-07

## 2021-02-20 ENCOUNTER — VIRTUAL VISIT (OUTPATIENT)
Dept: FAMILY MEDICINE | Facility: OTHER | Age: 26
End: 2021-02-20

## 2021-02-21 NOTE — PROGRESS NOTES
"Date: 2021 22:10:09  Clinician: Anastasiya Chaudhary  Clinician NPI: [id number]  Patient: Feliciano Sahu  Patient : 1995  Patient Address: [address] [place] [address], [place] [address]  Patient Phone: [phone]  Visit Protocol: Yeast infection  Patient Summary:  Feliciano is a 25 year old ( : 1995 ) female who initiated a [facility] Visit for a presumed vaginal yeast infection. When asked the question \"Please sign me up to receive news, health information and promotions from [facility].\", Feliciano responded \"No\".    Feliciano began noticing vaginal burning, vaginal pruritus, vaginal discharge, and vaginal irritation 4-6 days ago.   Symptom details   Vaginal discharge: She has a more than normal amount of white, smooth discharge. The discharge does not have a fishy smell.    She denies having blisters, open sores, and abdominal pain. She also denies feeling feverish.   She has attempted to treat her symptoms with hydrocortisone cream, anti-fungal cream for yeast infections, and anti-fungal suppositories for yeast infections. Anti-fungal medication used to treat symptoms as reported by the patient (free text): Monistat 1 day twice both ineffective.  Monistat 3 day also ineffective.    Precipitating events  Feliciano denies having a sexually transmitted disease.   Pertinent medical history  Feliciano has a history of vaginal yeast infections. She has had three (3) occurrences in the past year and the current symptoms are similar to previous yeast infections. She has used fluconazole (Diflucan) to treat previous yeast infections. 2 doses of fluconazole (Diflucan) has typically been needed for symptoms to resolve in the past.  She prefers a pill. She denies taking antibiotics in the past 2 weeks.   Feliciano has not had bacterial vaginosis in the past.   Feliciano has diabetes. She has been told by her provider that it is in control.   Feliciano denies having immunosuppressive conditions (e.g., chemotherapy, HIV, organ transplant, long-term use " of steroids or other immunosuppressive medications, splenectomy).   She does NOT smoke or use smokeless tobacco.   She denies pregnancy and denies breastfeeding. She does not menstruate.      MEDICATIONS: lisinopril oral, Depo-Provera intramuscular, Humalog U-100 Insulin subcutaneous, ibuprofen oral, ALLERGIES: NKDA  Clinician Response:  Dear Feliciano,  Based on the information you have provided, you likely have a vaginal yeast infection which is a common infection of the vagina caused by a fungus. Yeast are a type of fungus.  The most common symptom of a yeast infection is itching in and around the vagina. Other signs and symptoms include burning, redness, or a thick, white vaginal discharge that looks like cottage cheese and does not have a bad smell.  Medication information  I am prescribing:     Fluconazole (Diflucan) 150 mg oral tablet. Take 1 tablet by mouth in a single dose. Repeat dose in 3 days if symptoms are still present. There are no refills with this prescription.   Self care  Steps you can take to prevent symptoms of future vaginal yeast infection:     Avoid irritants such as scented bath products, tampons, pads, or vaginal sprays and powders    Avoid douching    Wear cotton underwear and if you are comfortable doing so, do not wear underwear to bed    Avoid hot tubs and whirlpool spas     When to seek care  Most women notice improvement in their symptoms within 1-2 days after starting treatment with complete clearing in 5-7 days.  Please make an appointment to be seen in a clinic or urgent care if any of the following occur:     Your symptoms have not improved in 3 days or not resolved in 10 days    You develop new symptoms or your symptoms become worse    If you think you may be at risk for an STD      Diagnosis: Candida vulvovaginitis  Diagnosis ICD: B37.3  Prescription: fluconazole (Diflucan) 150 mg oral tablet 2 tablet, 4 days supply. Take 1 tablet by mouth in a single dose, repeat dose in 3 days if  symptoms are still present. Refills: 0, Refill as needed: no, Allow substitutions: yes  Pharmacy: [id number] - [phone] - [phone] [id number]

## 2021-03-02 ENCOUNTER — TELEPHONE (OUTPATIENT)
Dept: FAMILY MEDICINE | Facility: OTHER | Age: 26
End: 2021-03-02

## 2021-03-02 NOTE — TELEPHONE ENCOUNTER
Patient Quality Outreach Summary      Summary:    Patient is due/failing the following:   Physical, Eye exam and Diabetic follow up with Endo    Type of outreach:    Phone, left message for patient/parent to call back.    Questions for provider review:    None                                                                                                                    Jina Daley CMA       Chart routed to Care Team.

## 2021-03-25 ENCOUNTER — PRENATAL OFFICE VISIT (OUTPATIENT)
Dept: OBGYN | Facility: OTHER | Age: 26
End: 2021-03-25
Payer: COMMERCIAL

## 2021-03-25 VITALS
OXYGEN SATURATION: 99 % | DIASTOLIC BLOOD PRESSURE: 60 MMHG | TEMPERATURE: 98.2 F | SYSTOLIC BLOOD PRESSURE: 148 MMHG | BODY MASS INDEX: 28.38 KG/M2 | WEIGHT: 152.25 LBS | HEART RATE: 120 BPM

## 2021-03-25 DIAGNOSIS — B96.89 BV (BACTERIAL VAGINOSIS): Primary | ICD-10-CM

## 2021-03-25 DIAGNOSIS — N89.8 VAGINAL DISCHARGE: Primary | ICD-10-CM

## 2021-03-25 DIAGNOSIS — Z71.6 ENCOUNTER FOR TOBACCO USE CESSATION COUNSELING: ICD-10-CM

## 2021-03-25 DIAGNOSIS — N76.0 BV (BACTERIAL VAGINOSIS): Primary | ICD-10-CM

## 2021-03-25 LAB
SPECIMEN SOURCE: ABNORMAL
WET PREP SPEC: ABNORMAL

## 2021-03-25 PROCEDURE — 87491 CHLMYD TRACH DNA AMP PROBE: CPT | Performed by: OBSTETRICS & GYNECOLOGY

## 2021-03-25 PROCEDURE — 87210 SMEAR WET MOUNT SALINE/INK: CPT | Performed by: OBSTETRICS & GYNECOLOGY

## 2021-03-25 PROCEDURE — 87591 N.GONORRHOEAE DNA AMP PROB: CPT | Performed by: OBSTETRICS & GYNECOLOGY

## 2021-03-25 PROCEDURE — 99215 OFFICE O/P EST HI 40 MIN: CPT | Performed by: OBSTETRICS & GYNECOLOGY

## 2021-03-25 RX ORDER — METRONIDAZOLE 500 MG/1
500 TABLET ORAL 2 TIMES DAILY
Qty: 14 TABLET | Refills: 0 | Status: SHIPPED | OUTPATIENT
Start: 2021-03-25 | End: 2021-04-01

## 2021-03-25 NOTE — PATIENT INSTRUCTIONS
Healthy vulval hygiene practices    Avoid  Substitute    Pantyhose  Stockings with a garter belt    Thigh-high or knee-high stockings   Synthetic underwear Cotton underwear or no underwear   Jeans and other tight pants Loose pants, skirts, dresses   Swimsuits, leotards, thongs, lycra garments Loose-fitting cotton garments   Panty liners Tampons or cotton pads   Scented soaps or shampoos Fragrance-free pH neutral soap (eg, Neutrogena fragrance free, pure olive oil soap, Cetaphil gentle skin cleanser or equivalent ingredients)   Bubble bath Tub baths in the morning and at night without additives and at a comfortable temperature   Scented detergents Unscented detergents   Washcloths Use fingertips for washing; pat dry, don't rub dry   Baby wipes or flushable wipes Rinse with water using sports water bottle or perineal irrigation bottle    Feminine sprays, douches, powders These are not necessary products and can be omitted from personal practices   Dyed toilet articles Toilet articles without dyes   Hair dryers to dry vulva skin without contact Dry vulva by gentle patting       Patient Education     Taking a Sitz Bath  A sitz bath is a type of therapy done by sitting in warm, shallow water. It can help soothe pain, itching, and other symptoms in the anal and genital areas. It can also help keep these areas clean if you can t take a bath or shower. Sitz is from the Chinese word sitzen, which means to sit.  Why a sitz bath is done  A sitz bath helps clean and treat certain problems in the anal area, genital area, and the perineum. The perineum is the area between the anus and the vulva in women. In men, it is between the anus and the scrotum. A sitz bath helps increase blood flow to these areas and relax the muscles.  A sitz bath may be done to:    Help ease pain and itching from hemorrhoids    Help ease pain from an anal fissure    Bathe and soothe the perineum after childbirth    Clean and soothe the anal area or  perineum after surgery    Ease prostate pain during prostatitis or after a procedure    Help ease period cramps    Clean the anal and genital areas if you can t take a bath or shower  How a sitz bath is done  A sitz bath can be done in 2 ways: in a bathtub or using a sitz bath bowl.  In a bathtub  To take a sitz bath in a tub:    Make sure your bathtub is clean. Fill a clean bathtub with 3 to 4 inches of warm water. In some cases, your healthcare provider may tell you to use cold water instead.    Add salt or medicine to the water if advised by your healthcare provider.    Gently lower yourself down into the bathtub and sit on the bottom of the tub. Don t get into the bath unless the water temperature is comfortable.    Hold on to a railing. Or ask for help from a family member, friend, or caregiver if needed.  If you have a wound, the water may cause pain at first. But the pain should ease. Make sure the area that needs treating is under the water. You can bend your knees up to help expose the area that needs contact with the water.  Using a sitz bath bowl  A sitz bath bowl is a special plastic container that s placed on a toilet. You can buy this in many drugstores and medical supply stores. To take a sitz bath this way:    Lift the toilet lid and seat. Place a cleaned plastic sitz bath bowl on the rim of your toilet. Make sure the bowl is firmly in place and won t move around.    Fill the sitz bath bowl with warm water from a pitcher or other container. The water should cover your perineum. Make sure the water temperature is comfortable.    Add salt or medicine to the water if advised by your healthcare provider. Or follow the package instructions about how to fill the bowl. Some kits come with a plastic bag and tubing. This lets you stream water into the bowl and at the area of your body that needs treatment. The bowl may have a slot or hole in the back. This lets water flow out so it doesn t overflow onto the  floor. If there is no hole, be careful not to fill the bowl too full.    Gently sit down on the sitz bath bowl. Hold on to a railing. Or ask for help from a family member, friend, or caregiver if needed.  If you have a wound, the water may cause pain at first, but the pain should ease. Make sure the area that needs treating is under the water.  For any type of sitz bath:  1. Sit in the water for 10 to 20 minutes.  2. Add more warm water as needed to keep the water comfortable.  3. Get up slowly from the tub or toilet. You may feel lightheaded or dizzy. Hold on to a railing. Or ask for help from a family member, friend, or caregiver if needed.  4. Gently pat your anal area, perineum, and genitals dry with a clean towel. Don t rub the area.  5. Wash your hands. Put any ointment or cream on the area, if advised.  6. Wash the bathtub or sitz bath bowl with soap and water after each use.  7. Use a sitz bath 2 to 3 times a day, or as often as your healthcare provider advises.  When to call your healthcare provider  Call your healthcare provider right away if you have any of these:    Fever of 100.4 F (38 C) or higher, or as directed by your healthcare provider    Redness, swelling, or fluid leaking from a cut (incision) that gets worse    Pain that gets worse    Symptoms that don t get better, or get worse    New symptoms  Dee Dee last reviewed this educational content on 9/1/2019 2000-2020 [employer]. All rights reserved. This information is not intended as a substitute for professional medical care. Always follow your healthcare professional's instructions.

## 2021-03-25 NOTE — PROGRESS NOTES
SUBJECTIVE:       HPI: Feliciano Sahu is a 25 year old  who presents today for recurrent yeast infections.     Patient states history of 2 yeast infections in the last 3 months, past year has had approximately 8. Has not seen provider, but has been self-treating with Monostat monthly.  Reports Monostat does not help much. Treated with Diflucan via VV but symptoms did not fully resolve.    Currently, reports vaginal itching, redness/irritation, small amount of white, thick discharge, occasional odor.     Vulvar and vaginal hygeine practices:  -Washes with antibacterial, nonscented, bar soap. Washes around labia, not inside vaginal area. Takes showers.   -Denies use feminine sprays/cleaning products  -Occasionally shaves pubic hair  -Denies douches  -Denies incontinence  -Sexually active with 1 partners, male. Does use lubricant, but has discomfort every time with sex related to vaginal dryness, with and without lubricant.     Medical history significant for type 1 diabetes, hypertension. Has been maintaining blood sugars over one year. Has an insulin pump.     She denies irregular bleeding, dysmenorrhea, dyspareunia. She denies fevers/chills, nausea/vomiting, abdominal pain or bloating. Denies dysuria, hematuria, constipation or diarrhea.      Ob Hx:      Gyn Hx: No LMP recorded (lmp unknown). (Menstrual status: Birth Control).     Last pap was 2/15/2019: NIL, No history of abnormal paps. Next pap due 2022   STI history: Denies  Using vasectomy for contraception  STD testing offered?  Declined  Menarche 11 years old. On Depo for 5 years, no menses.   Family history of gyn-related malignancies: Denies     reports that she has never smoked. She has never used smokeless tobacco.  Tobacco Cessation Action Plan: Patient would like information    Today's PHQ-2 Score:   PHQ-2 (  [employer]) 3/6/2020   Q1: Little interest or pleasure in doing things 0   Q2: Feeling down, depressed or hopeless 0   PHQ-2  Score 0   Q1: Little interest or pleasure in doing things Not at all   Q2: Feeling down, depressed or hopeless Not at all   PHQ-2 Score 0     Today's PHQ-9 Score:   PHQ-9 SCORE 3/6/2020   PHQ-9 Total Score MyChart 0   PHQ-9 Total Score 0     Today's SANDIP-7 Score:   SANDIP-7 SCORE 3/6/2020   Total Score 3 (minimal anxiety)   Total Score 3       Problem list and histories reviewed & adjusted, as indicated.  Additional history: as documented.    Patient Active Problem List   Diagnosis     Eczema     Sinus tachycardia     Contraception     Uncontrolled type 1 diabetes mellitus with nephropathy (H)     Heart murmur     Essential hypertension     Past Surgical History:   Procedure Laterality Date     EYE SURGERY      lazy eye correction (strabismus?)      Social History     Tobacco Use     Smoking status: Never Smoker     Smokeless tobacco: Never Used     Tobacco comment: no smokers in household   Substance Use Topics     Alcohol use: No      Problem (# of Occurrences) Relation (Name,Age of Onset)    Allergies (1) Mother: seasonal    Cancer (1) Paternal Grandfather: skin c/a    Lipids (1) Father    Thyroid Disease (1) Mother: hypothyridism       Negative family history of: Asthma, C.A.D., Diabetes, Hypertension, Cerebrovascular Disease, Breast Cancer, Cancer - colorectal, Prostate Cancer, Alcohol/Drug, Alzheimer Disease, Anesthesia Reaction, Arthritis, Blood Disease, Cardiovascular, Circulatory, Congenital Anomalies, Connective Tissue Disorder, Depression, Eye Disorder, Genetic Disorder, Gastrointestinal Disease, Genitourinary Problems, Gynecology, Heart Disease, Musculoskeletal Disorder, Neurologic Disorder, Obesity, Osteoporosis, Psychotic Disorder, Respiratory, Hearing Loss            blood glucose monitoring (RACHEL CONTOUR NEXT) test strip, Use to test blood sugar 4 times daily or as directed.  Blood Glucose Monitoring Suppl (BLOOD GLUCOSE METER) KIT, 1 kit 3 times daily.  cetirizine (ZYRTEC) 10 MG tablet, Take 1 tablet  "(10 mg) by mouth daily as needed for allergies  Continuous Blood Gluc Sensor (DEXCOM G6 SENSOR) MISC, Change every 10 days.  Continuous Blood Gluc Transmit (DEXCOM G6 TRANSMITTER) MISC, Change every 3 months.  glucagon (GLUCAGON EMERGENCY) 1 MG kit, Inject 1 mg subcutaneously or intramuscularly.  insulin lispro (HUMALOG) 100 UNIT/ML vial, USES UP TO 75 UNITS PER DAY IN INSULIN PUMP FOR BASAL,BOLUS AND PRIMING INSULIN PUMP TUBING  insulin syringe 31G X 5/16\" 0.5 ML MISC, Use 4-6 times daily in the event of pump failure  levonorgest-eth estrad 91-Day (SEASONIQUE) 0.15-0.03 &0.01 MG tablet, Take 1 tablet by mouth daily  lisinopril (ZESTRIL) 5 MG tablet, Take 1 tablet (5 mg) by mouth daily  metoprolol succinate ER (TOPROL-XL) 25 MG 24 hr tablet, Take 1/2 (one-half) tablet by mouth once daily  Blood Pressure KIT, 1 kit daily (Patient not taking: Reported on 1/6/2021)  medroxyPROGESTERone (DEPO-PROVERA) 150 MG/ML IM injection, Inject 1 mL (150 mg) into the muscle every 3 months    medroxyPROGESTERone (DEPO-PROVERA) injection 150 mg      Allergies   Allergen Reactions     Nkda [No Known Drug Allergies]        ROS:  10 Point review of systems negative other noted above in HPI    OBJECTIVE:     BP (!) 148/60 (BP Location: Right arm, Patient Position: Chair, Cuff Size: Adult Regular)   Pulse 120   Temp 98.2  F (36.8  C) (Temporal)   Wt 69.1 kg (152 lb 4 oz)   LMP  (LMP Unknown)   SpO2 99%   BMI 28.38 kg/m    Body mass index is 28.38 kg/m .    Gen: Alert, oriented, appropriately interactive, NAD  Neck: no masses  CV: no edema  Resp: no shortness of breath, able to speak in full sentences without distress  Abdomen: soft, non tender, non distended, no masses, no hernias.  External genitalia: no lesions; normal appearing external genitalia, bartholins glands, urethra, skenes glands, no redness, irritation, inflammation.   Vagina: no masses or lesions, normally rugated. Creamy, white discharge present  Cervix: no masses or " "lesions or discharge   Bimanual exam:   Nontender pelvic floor muscles  Urethra: nontender   Bladder: nontender and without massess, well supported   Uterus: midline, small,  no masses, non-tender  Adnexa: no masses or tenderness appreciated   No cervical motion tenderness  MSK: normal gait, symmetric movements UE & LE  Lower extremities: non-tender, no edema    In-Clinic Test Results:  No results found for this or any previous visit (from the past 24 hour(s)).    ASSESSMENT/PLAN:                                                      Feliciano Sahu is a 25 year old  who presents today for concern for recurrent yeast infections.      ICD-10-CM    1. Vaginal discharge  N89.8 Wet prep     Chlamydia trachomatis PCR     Neisseria gonorrhoeae PCR   2. Encounter for tobacco use cessation counseling  Z71.6 QUIT PARTNER (TOBACCO CESSATION) REFERRAL     We discussed etiologies of vaginal symptoms such as yeast infection, bacterial vaginosis, and hygiene practices. No recent wet preps collected; therefore, cannot confirm recurrent yeast infections, especially without improvement from multiple forms of treatment. This was discussed with the patient, recommending wet preps in the future for confirmation of infection, prior to self treatment. Wet prep, GC/CT collected. Will treat as appropriate.     Discussed vulvar and vaginal hygiene practices, and list of practices given to patient. Advised to avoid Over the counter \"feminine\" soaps/sprays. Instead, recommend Cetaphil cleanser or other gentle ph-neutral cleanser. No need to use any products inside of the vagina.     Smoking cessation counseling provided, patient interested in additional resources. Quit Plan referral placed.     cHTN with BP elevated today in the office. Recommend follow-up with primary for further management.    MACY HoyosN, RN, DNP student, [facility] specialty, with the consent of the patient and in collaboration with Dr. Hickey performed the " initial HPI, ROS, physical exam and acted as scribe for the remainder of the visit.     Physician Attestation:  I was present with the student who participated in the service and in the documentation of the note. I have verified the history and personally performed the physical exam and medical decision making. I agree with the assessment and plan of care as documented in the note, and have edited to reflect my findings.    40 minutes spent on the date of the encounter doing chart review, history and exam, documentation and further activities as noted above, not including time spent with student    Kelley Hickey DO  [facility]

## 2021-03-26 LAB
C TRACH DNA SPEC QL NAA+PROBE: NEGATIVE
N GONORRHOEA DNA SPEC QL NAA+PROBE: NEGATIVE
SPECIMEN SOURCE: NORMAL
SPECIMEN SOURCE: NORMAL

## 2021-04-02 DIAGNOSIS — R00.0 SINUS TACHYCARDIA: ICD-10-CM

## 2021-04-02 RX ORDER — METOPROLOL SUCCINATE 25 MG/1
TABLET, EXTENDED RELEASE ORAL
Qty: 45 TABLET | Refills: 0 | Status: SHIPPED | OUTPATIENT
Start: 2021-04-02 | End: 2021-06-28

## 2021-04-02 NOTE — TELEPHONE ENCOUNTER
Routing refill request to provider for review/approval because:  Elevated BP on file.     Jacinta Templeton RN

## 2021-04-02 NOTE — TELEPHONE ENCOUNTER
My Chart message sent to patient to schedule appointment prior to needing further refills on medication.    Bridget Vickers XRO/

## 2021-04-02 NOTE — TELEPHONE ENCOUNTER
Please call patient to set up visit with provider of choice to recheck BP and address the recently elevated BPs. I sent in a yasmani refill of metoprolol.   Larisa Banks, CNP

## 2021-04-30 ASSESSMENT — ENCOUNTER SYMPTOMS
ARTHRALGIAS: 0
DIZZINESS: 0
NERVOUS/ANXIOUS: 0
CHILLS: 0
MYALGIAS: 0
HEARTBURN: 0
FREQUENCY: 0
JOINT SWELLING: 0
PARESTHESIAS: 0
HEMATURIA: 0
PALPITATIONS: 0
CONSTIPATION: 0
FEVER: 0
NAUSEA: 0
ABDOMINAL PAIN: 0
HEMATOCHEZIA: 0
SORE THROAT: 0
HEADACHES: 0
DIARRHEA: 0
EYE PAIN: 0
WEAKNESS: 0
DYSURIA: 0
BREAST MASS: 0
SHORTNESS OF BREATH: 0
COUGH: 0

## 2021-04-30 NOTE — PROGRESS NOTES
SUBJECTIVE:   CC: Feliciano Sahu is an 26 year old woman who presents for preventive health visit.     Patient has been advised of split billing requirements and indicates understanding: Yes  Healthy Habits:     Getting at least 3 servings of Calcium per day:  Yes    Bi-annual eye exam:  Yes    Dental care twice a year:  Yes    Sleep apnea or symptoms of sleep apnea:  None    Diet:  Carbohydrate counting    Frequency of exercise:  2-3 days/week    Duration of exercise:  Less than 15 minutes    Taking medications regularly:  Yes    Medication side effects:  None    PHQ-2 Total Score: 2    Additional concerns today:  No     She feels she is struggled with depression and anxiety since she was a teenager.  When she was young her parents did not express much emotionally and she has typically followed in their footsteps with  presenting a calm exterior and she does not like talking about her emotions because it makes her feel emotional and will cry.  She has never addressed her mental health because that she has not wanted to pursue medications.  She thought that as a teenager and young adult that it was just a phase and that it would get better with time but it has not.  She will cope when she is feeling down by sleeping more.    She like to quit smoking.  She is motivated to quit.  She has used patches and gum in the past, tried quitting cold turkey tried cutting down but none have of these have been effective.  She is interested in trying medication to quit smoking.    Her blood pressure has been elevated at recent visits.  She is currently taking lisinopril 5 mg daily.  She does not check her blood pressure outside the clinic.  She reports increased stress with her job.  She is happily .  Today's PHQ-2 Score:   PHQ-2 ( 1999 [employer]izer) 4/30/2021   Q1: Little interest or pleasure in doing things 1   Q2: Feeling down, depressed or hopeless 1   PHQ-2 Score 2   Q1: Little interest or pleasure in doing things  Several days   Q2: Feeling down, depressed or hopeless Several days   PHQ-2 Score 2       PHQ 8/6/2018 3/6/2020   PHQ-9 Total Score 17 0   Q9: Thoughts of better off dead/self-harm past 2 weeks Not at all Not at all     SANDIP-7 SCORE 8/6/2018 3/6/2020   Total Score - 3 (minimal anxiety)   Total Score 19 3         Abuse: Current or Past (Physical, Sexual or Emotional) - No  Do you feel safe in your environment? Yes    Have you ever done Advance Care Planning? (For example, a Health Directive, POLST, or a discussion with a medical provider or your loved ones about your wishes): No, advance care planning information given to patient to review.  Patient plans to discuss their wishes with loved ones or provider.      Social History     Tobacco Use     Smoking status: Never Smoker     Smokeless tobacco: Never Used     Tobacco comment: no smokers in household   Substance Use Topics     Alcohol use: No     If you drink alcohol do you typically have >3 drinks per day or >7 drinks per week? No    No flowsheet data found.    Reviewed orders with patient.  Reviewed health maintenance and updated orders accordingly - Yes  Lab work is in process  Labs reviewed in EPIC  BP Readings from Last 3 Encounters:   05/04/21 (!) 148/82   03/25/21 (!) 148/60   01/06/21 (!) 150/70    Wt Readings from Last 3 Encounters:   05/04/21 67.1 kg (148 lb)   03/25/21 69.1 kg (152 lb 4 oz)   01/06/21 68.9 kg (152 lb)                  Patient Active Problem List   Diagnosis     Eczema     Sinus tachycardia     Contraception     Uncontrolled type 1 diabetes mellitus with nephropathy (H)     Heart murmur     Essential hypertension     Past Surgical History:   Procedure Laterality Date     EYE SURGERY      lazy eye correction (strabismus?)       Social History     Tobacco Use     Smoking status: Never Smoker     Smokeless tobacco: Never Used     Tobacco comment: no smokers in household   Substance Use Topics     Alcohol use: No     Family History   Problem  Relation Age of Onset     Allergies Mother         seasonal     Thyroid Disease Mother         hypothyridism     Lipids Father      Cancer Paternal Grandfather         skin c/a     Asthma No family hx of      C.A.D. No family hx of      Diabetes No family hx of      Hypertension No family hx of      Cerebrovascular Disease No family hx of      Breast Cancer No family hx of      Cancer - colorectal No family hx of      Prostate Cancer No family hx of      Alcohol/Drug No family hx of      Alzheimer Disease No family hx of      Anesthesia Reaction No family hx of      Arthritis No family hx of      Blood Disease No family hx of      Cardiovascular No family hx of      Circulatory No family hx of      Congenital Anomalies No family hx of      Connective Tissue Disorder No family hx of      Depression No family hx of      Eye Disorder No family hx of      Genetic Disorder No family hx of      Gastrointestinal Disease No family hx of      Genitourinary Problems No family hx of      Gynecology No family hx of      Heart Disease No family hx of      Musculoskeletal Disorder No family hx of      Neurologic Disorder No family hx of      Obesity No family hx of      Osteoporosis No family hx of      Psychotic Disorder No family hx of      Respiratory No family hx of      Hearing Loss No family hx of          Current Outpatient Medications   Medication Sig Dispense Refill     blood glucose monitoring ([employer] CONTOUR NEXT) test strip Use to test blood sugar 4 times daily or as directed. 100 strip 0     Blood Glucose Monitoring Suppl (BLOOD GLUCOSE METER) KIT 1 kit 3 times daily. 1 kit 0     cetirizine (ZYRTEC) 10 MG tablet Take 1 tablet (10 mg) by mouth daily as needed for allergies 90 tablet 1     Continuous Blood Gluc Sensor (DEXCOM G6 SENSOR) MISC Change every 10 days. 9 each 3     Continuous Blood Gluc Transmit (DEXCOM G6 TRANSMITTER) MISC Change every 3 months. 1 each 3     glucagon (GLUCAGON EMERGENCY) 1 MG kit Inject 1 mg  "subcutaneously or intramuscularly. 1 each 3     insulin lispro (HUMALOG) 100 UNIT/ML vial USES UP TO 75 UNITS PER DAY IN INSULIN PUMP FOR BASAL,BOLUS AND PRIMING INSULIN PUMP TUBING 80 mL 2     insulin syringe 31G X 5/16\" 0.5 ML MISC Use 4-6 times daily in the event of pump failure 50 each 1     levonorgest-eth estrad 91-Day (SEASONIQUE) 0.15-0.03 &0.01 MG tablet Take 1 tablet by mouth daily 91 tablet 7     lisinopril (ZESTRIL) 5 MG tablet Take 1 tablet (5 mg) by mouth daily 90 tablet 3     metoprolol succinate ER (TOPROL-XL) 25 MG 24 hr tablet Take 1/2 (one-half) tablet by mouth once daily 45 tablet 0     varenicline (CHANTIX ONEIDA) 0.5 MG X 11 & 1 MG X 42 tablet Take 0.5 mg tab daily for 3 days, THEN 0.5 mg tab twice daily for 4 days, THEN 1 mg twice daily. 53 tablet 0     Blood Pressure KIT 1 kit daily (Patient not taking: Reported on 1/6/2021) 1 kit 0     Allergies   Allergen Reactions     Nkda [No Known Drug Allergies]      Recent Labs   Lab Test 11/10/20  0905 07/18/19  1612 07/10/19 07/24/18 05/30/18  0848   A1C 7.4*  --  7.4* 7.4  --    LDL 71 75  --   --  110*   HDL 46* 59  --   --  65   TRIG 177* 118  --   --  75   ALT 53* 34  --   --  35   CR 0.59 0.62  --   --  0.68   GFRESTIMATED >90 >90  --   --  >90   GFRESTBLACK >90 >90  --   --  >90   POTASSIUM 4.3 3.8  --   --  4.4   TSH 3.27 3.61  --   --  2.04        Breast Cancer Screening:  Any new diagnosis of family breast, ovarian, or bowel cancer? No    FSH-7: No flowsheet data found.    Patient under 40 years of age: Routine Mammogram Screening not recommended.   Pertinent mammograms are reviewed under the imaging tab.    History of abnormal Pap smear: NO - age 21-29 PAP every 3 years recommended  PAP / HPV 2/15/2019 10/14/2016   PAP NIL NIL     Reviewed and updated as needed this visit by clinical staff  Tobacco  Allergies  Meds   Med Hx  Surg Hx  Fam Hx  Soc Hx        Reviewed and updated as needed this visit by Provider                Past Medical " "History:   Diagnosis Date     Diabetes mellitus (H) 1998    Type 1     Diabetic eye exam (H) 08/09/2013    [facility]      Past Surgical History:   Procedure Laterality Date     EYE SURGERY      lazy eye correction (strabismus?)       Review of Systems   Constitutional: Negative for chills and fever.   HENT: Negative for congestion, ear pain, hearing loss and sore throat.    Eyes: Negative for pain and visual disturbance.   Respiratory: Negative for cough and shortness of breath.    Cardiovascular: Negative for chest pain, palpitations and peripheral edema.   Gastrointestinal: Negative for abdominal pain, constipation, diarrhea, heartburn, hematochezia and nausea.   Breasts:  Negative for tenderness, breast mass and discharge.   Genitourinary: Negative for dysuria, frequency, genital sores, hematuria, pelvic pain, urgency, vaginal bleeding and vaginal discharge.   Musculoskeletal: Negative for arthralgias, joint swelling and myalgias.   Skin: Negative for rash.   Neurological: Negative for dizziness, weakness, headaches and paresthesias.   Psychiatric/Behavioral: Negative for mood changes. The patient is not nervous/anxious.           OBJECTIVE:   BP (!) 148/82   Pulse 124   Temp 99.4  F (37.4  C) (Temporal)   Ht 1.58 m (5' 2.21\")   Wt 67.1 kg (148 lb)   LMP 04/26/2021 (Exact Date)   SpO2 97%   BMI 26.89 kg/m    Physical Exam  GENERAL: healthy, alert and no distress  EYES: Eyes grossly normal to inspection, PERRL and conjunctivae and sclerae normal  HENT: ear canals and TM's normal, nose and mouth without ulcers or lesions  NECK: no adenopathy, no asymmetry, masses, or scars and thyroid normal to palpation  RESP: lungs clear to auscultation - no rales, rhonchi or wheezes  CV: regular rate and rhythm, normal S1 S2, no S3 or S4, no murmur, click or rub, no peripheral edema   ABDOMEN: soft, nontender, no hepatosplenomegaly, no masses and bowel sounds normal  MS: no gross musculoskeletal defects noted, " no edema  SKIN: no suspicious lesions or rashes  NEURO: Normal strength and tone, mentation intact and speech normal  PSYCH: mentation appears normal, anxious, judgement and insight intact and appearance well groomed    Diagnostic Test Results:  Labs reviewed in Epic    ASSESSMENT/PLAN:   1. Routine general medical examination at a health care facility  See notes    2. Benign essential hypertension  Not controlled.  Increase dose of lisinopril to 10 mg daily and asked her to have her blood pressure rechecked in 3 weeks by one of the pharmacists.  She is going to use of the 5 mg tablets at home and take 2 of them daily.  - lisinopril (ZESTRIL) 5 MG tablet; Take 2 tablets (10 mg) by mouth daily Will use up 5 mg tablets she has at home.    3. Encounter for tobacco use cessation counseling  She has tried nicotine patches, nicotine gum, quitting cold turkey and cutting back which have not been effective for quitting smoking.  She is agreeable to trying Chantix.  Discussed use and potential side effects.  We also discussed the option of Wellbutrin for smoking cessation given she has underlying anxiety but she would like to try the Chantix first since this is found to be more effective for quitting smoking.  - varenicline (CHANTIX ONEIDA) 0.5 MG X 11 & 1 MG X 42 tablet; Take 0.5 mg tab daily for 3 days, THEN 0.5 mg tab twice daily for 4 days, THEN 1 mg twice daily.  Dispense: 53 tablet; Refill: 0    4. Anxiety  Discussed potential option of Wellbutrin to treat anxiety and symptoms of depression.  She is going to think about this and will message me if this is something that she would like to start after she is done taking the Chantix.      Patient has been advised of split billing requirements and indicates understanding: Yes  COUNSELING:  Reviewed preventive health counseling, as reflected in patient instructions       Regular exercise       Healthy diet/nutrition    Estimated body mass index is 26.89 kg/m  as calculated  "from the following:    Height as of this encounter: 1.58 m (5' 2.21\").    Weight as of this encounter: 67.1 kg (148 lb).    Weight management plan: Discussed healthy diet and exercise guidelines    She reports that she has never smoked. She has never used smokeless tobacco.      Counseling Resources:  ATP IV Guidelines  Pooled Cohorts Equation Calculator  Breast Cancer Risk Calculator  BRCA-Related Cancer Risk Assessment: FHS-7 Tool  FRAX Risk Assessment  ICSI Preventive Guidelines  Dietary Guidelines for Americans, 2010  USDA's MyPlate  ASA Prophylaxis  Lung CA Screening    Cecilia Banks, CLEMENCIA CNP  [facility]  "

## 2021-04-30 NOTE — PATIENT INSTRUCTIONS
Increase dose of lisinopril to 10 mg daily. Stop at a [place] pharmacy in 3-4 weeks to have the pharmacist recheck your blood pressure.    Start Chantix - message me if any questions after starting it.    Consider Wellbutrin and message me if you decide you want to start it.    Larisa Banks, FOREIGN    Preventive Health Recommendations  Female Ages 26 - 39  Yearly exam:   See your health care provider every year in order to    Review health changes.     Discuss preventive care.      Review your medicines if you your doctor has prescribed any.    Until age 30: Get a Pap test every three years (more often if you have had an abnormal result).    After age 30: Talk to your doctor about whether you should have a Pap test every 3 years or have a Pap test with HPV screening every 5 years.   You do not need a Pap test if your uterus was removed (hysterectomy) and you have not had cancer.  You should be tested each year for STDs (sexually transmitted diseases), if you're at risk.   Talk to your provider about how often to have your cholesterol checked.  If you are at risk for diabetes, you should have a diabetes test (fasting glucose).  Shots: Get a flu shot each year. Get a tetanus shot every 10 years.   Nutrition:     Eat at least 5 servings of fruits and vegetables each day.    Eat whole-grain bread, whole-wheat pasta and brown rice instead of white grains and rice.    Get adequate Calcium and Vitamin D.     Lifestyle    Exercise at least 150 minutes a week (30 minutes a day, 5 days of the week). This will help you control your weight and prevent disease.    Limit alcohol to one drink per day.    No smoking.     Wear sunscreen to prevent skin cancer.    See your dentist every six months for an exam and cleaning.

## 2021-05-03 RX ORDER — LEVONORGESTREL / ETHINYL ESTRADIOL AND ETHINYL ESTRADIOL 150-30(84)
1 KIT ORAL DAILY
Qty: 91 TABLET | Refills: 7 | Status: CANCELLED | OUTPATIENT
Start: 2021-05-03

## 2021-05-03 RX ORDER — LISINOPRIL 20 MG/1
20 TABLET ORAL DAILY
Status: CANCELLED | OUTPATIENT
Start: 2021-05-03

## 2021-05-03 RX ORDER — LISINOPRIL 5 MG/1
5 TABLET ORAL DAILY
Qty: 90 TABLET | Refills: 3 | Status: CANCELLED | OUTPATIENT
Start: 2021-05-03

## 2021-05-03 RX ORDER — LISINOPRIL 10 MG/1
10 TABLET ORAL DAILY
Status: CANCELLED | OUTPATIENT
Start: 2021-05-03

## 2021-05-04 ENCOUNTER — OFFICE VISIT (OUTPATIENT)
Dept: FAMILY MEDICINE | Facility: OTHER | Age: 26
End: 2021-05-04
Payer: COMMERCIAL

## 2021-05-04 VITALS
SYSTOLIC BLOOD PRESSURE: 148 MMHG | HEART RATE: 124 BPM | DIASTOLIC BLOOD PRESSURE: 82 MMHG | TEMPERATURE: 99.4 F | HEIGHT: 62 IN | WEIGHT: 148 LBS | OXYGEN SATURATION: 97 % | BODY MASS INDEX: 27.23 KG/M2

## 2021-05-04 DIAGNOSIS — Z71.6 ENCOUNTER FOR TOBACCO USE CESSATION COUNSELING: ICD-10-CM

## 2021-05-04 DIAGNOSIS — Z00.00 ROUTINE GENERAL MEDICAL EXAMINATION AT A HEALTH CARE FACILITY: Primary | ICD-10-CM

## 2021-05-04 DIAGNOSIS — I10 BENIGN ESSENTIAL HYPERTENSION: ICD-10-CM

## 2021-05-04 DIAGNOSIS — F41.9 ANXIETY: ICD-10-CM

## 2021-05-04 PROCEDURE — 99214 OFFICE O/P EST MOD 30 MIN: CPT | Mod: 25 | Performed by: STUDENT IN AN ORGANIZED HEALTH CARE EDUCATION/TRAINING PROGRAM

## 2021-05-04 PROCEDURE — 99395 PREV VISIT EST AGE 18-39: CPT | Performed by: STUDENT IN AN ORGANIZED HEALTH CARE EDUCATION/TRAINING PROGRAM

## 2021-05-04 RX ORDER — LISINOPRIL 5 MG/1
10 TABLET ORAL DAILY
Start: 2021-05-04 | End: 2021-06-04

## 2021-05-04 ASSESSMENT — ENCOUNTER SYMPTOMS
HEADACHES: 0
WEAKNESS: 0
HEMATOCHEZIA: 0
MYALGIAS: 0
DIZZINESS: 0
HEMATURIA: 0
ARTHRALGIAS: 0
DIARRHEA: 0
EYE PAIN: 0
NERVOUS/ANXIOUS: 0
JOINT SWELLING: 0
PARESTHESIAS: 0
FREQUENCY: 0
CHILLS: 0
CONSTIPATION: 0
NAUSEA: 0
COUGH: 0
BREAST MASS: 0
HEARTBURN: 0
ABDOMINAL PAIN: 0
SORE THROAT: 0
FEVER: 0
SHORTNESS OF BREATH: 0
DYSURIA: 0
PALPITATIONS: 0

## 2021-05-04 ASSESSMENT — ANXIETY QUESTIONNAIRES
1. FEELING NERVOUS, ANXIOUS, OR ON EDGE: NEARLY EVERY DAY
IF YOU CHECKED OFF ANY PROBLEMS ON THIS QUESTIONNAIRE, HOW DIFFICULT HAVE THESE PROBLEMS MADE IT FOR YOU TO DO YOUR WORK, TAKE CARE OF THINGS AT HOME, OR GET ALONG WITH OTHER PEOPLE: SOMEWHAT DIFFICULT
2. NOT BEING ABLE TO STOP OR CONTROL WORRYING: MORE THAN HALF THE DAYS
3. WORRYING TOO MUCH ABOUT DIFFERENT THINGS: MORE THAN HALF THE DAYS
6. BECOMING EASILY ANNOYED OR IRRITABLE: NEARLY EVERY DAY
7. FEELING AFRAID AS IF SOMETHING AWFUL MIGHT HAPPEN: MORE THAN HALF THE DAYS
5. BEING SO RESTLESS THAT IT IS HARD TO SIT STILL: SEVERAL DAYS
GAD7 TOTAL SCORE: 14

## 2021-05-04 ASSESSMENT — PATIENT HEALTH QUESTIONNAIRE - PHQ9
5. POOR APPETITE OR OVEREATING: SEVERAL DAYS
SUM OF ALL RESPONSES TO PHQ QUESTIONS 1-9: 11

## 2021-05-04 ASSESSMENT — MIFFLIN-ST. JEOR: SCORE: 1357.82

## 2021-05-05 ASSESSMENT — ANXIETY QUESTIONNAIRES: GAD7 TOTAL SCORE: 14

## 2021-05-17 ENCOUNTER — APPOINTMENT (OUTPATIENT)
Dept: URGENT CARE | Facility: CLINIC | Age: 26
End: 2021-05-17
Payer: COMMERCIAL

## 2021-05-22 ENCOUNTER — HEALTH MAINTENANCE LETTER (OUTPATIENT)
Age: 26
End: 2021-05-22

## 2021-05-24 ENCOUNTER — E-VISIT (OUTPATIENT)
Dept: URGENT CARE | Facility: URGENT CARE | Age: 26
End: 2021-05-24
Payer: COMMERCIAL

## 2021-05-24 DIAGNOSIS — L02.91 ABSCESS: Primary | ICD-10-CM

## 2021-05-24 PROCEDURE — 99421 OL DIG E/M SVC 5-10 MIN: CPT | Performed by: PHYSICIAN ASSISTANT

## 2021-05-24 RX ORDER — CEPHALEXIN 500 MG/1
500 CAPSULE ORAL 3 TIMES DAILY
Qty: 30 CAPSULE | Refills: 0 | Status: SHIPPED | OUTPATIENT
Start: 2021-05-24 | End: 2021-07-12

## 2021-06-01 ENCOUNTER — MYC REFILL (OUTPATIENT)
Dept: FAMILY MEDICINE | Facility: OTHER | Age: 26
End: 2021-06-01

## 2021-06-01 DIAGNOSIS — I10 BENIGN ESSENTIAL HYPERTENSION: ICD-10-CM

## 2021-06-01 RX ORDER — LISINOPRIL 5 MG/1
10 TABLET ORAL DAILY
Status: CANCELLED | OUTPATIENT
Start: 2021-06-01

## 2021-06-02 ENCOUNTER — E-VISIT (OUTPATIENT)
Dept: URGENT CARE | Facility: URGENT CARE | Age: 26
End: 2021-06-02
Payer: COMMERCIAL

## 2021-06-02 DIAGNOSIS — B37.31 CANDIDAL VULVOVAGINITIS: Primary | ICD-10-CM

## 2021-06-02 PROCEDURE — 99422 OL DIG E/M SVC 11-20 MIN: CPT | Performed by: PREVENTIVE MEDICINE

## 2021-06-02 RX ORDER — FLUCONAZOLE 150 MG/1
150 TABLET ORAL DAILY
Qty: 3 TABLET | Refills: 0 | Status: SHIPPED | OUTPATIENT
Start: 2021-06-02 | End: 2021-12-15

## 2021-06-03 DIAGNOSIS — I10 BENIGN ESSENTIAL HYPERTENSION: ICD-10-CM

## 2021-06-03 NOTE — TELEPHONE ENCOUNTER
Pending Prescriptions:                       Disp   Refills    lisinopril (ZESTRIL) 5 MG tablet                           Sig: Take 2 tablets (10 mg) by mouth daily Will use up 5           mg tablets she has at home.      Routing refill request to provider for review/approval because:  Labs out of range:  Blood pressure    Justa Hahn RN on 6/3/2021 at 1:56 PM

## 2021-06-03 NOTE — PATIENT INSTRUCTIONS
Vandana Wiggins - I have prescribed fluconazole 150 mg daily for 3 days for your yeast infection.  Please follow up in clinic in person if you are not improving in the next 5-7 days.    Dr Pato Love

## 2021-06-04 RX ORDER — LISINOPRIL 10 MG/1
10 TABLET ORAL DAILY
Qty: 30 TABLET | Refills: 0 | Status: SHIPPED | OUTPATIENT
Start: 2021-06-04 | End: 2021-06-28

## 2021-06-04 NOTE — TELEPHONE ENCOUNTER
Please call patient to remind to come in for BP recheck at pharmacy or with MA. I sent in prescription for lisinopril 10 mg tabs.  Larisa Banks, CNP

## 2021-06-07 NOTE — TELEPHONE ENCOUNTER
Called and left a voicemail letting her know Larisa wants her to come in for a BP check, also I let her know that the lisinopril was refilled. I sent a [employer] message letting her know as well.    Closing Encounter.    Kathleen Herman, [facility]

## 2021-06-08 ENCOUNTER — ALLIED HEALTH/NURSE VISIT (OUTPATIENT)
Dept: FAMILY MEDICINE | Facility: OTHER | Age: 26
End: 2021-06-08
Payer: COMMERCIAL

## 2021-06-08 VITALS — SYSTOLIC BLOOD PRESSURE: 112 MMHG | DIASTOLIC BLOOD PRESSURE: 60 MMHG

## 2021-06-08 DIAGNOSIS — I10 ESSENTIAL HYPERTENSION: Primary | ICD-10-CM

## 2021-06-08 PROCEDURE — 99207 PR NO CHARGE NURSE ONLY: CPT | Performed by: STUDENT IN AN ORGANIZED HEALTH CARE EDUCATION/TRAINING PROGRAM

## 2021-06-08 RX ORDER — LISINOPRIL 5 MG/1
TABLET ORAL
Qty: 90 TABLET | Refills: 0 | OUTPATIENT
Start: 2021-06-08

## 2021-06-08 NOTE — PROGRESS NOTES
Feliciano Sahu was evaluated at [facility] on June 8, 2021 at which time her blood pressure was:    BP Readings from Last 3 Encounters:   06/08/21 112/60   05/04/21 (!) 148/82   03/25/21 (!) 148/60     Pulse Readings from Last 3 Encounters:   05/04/21 124   03/25/21 120   01/06/21 128       Reviewed lifestyle modifications for blood pressure control and reduction: including making healthy food choices, managing weight, getting regular exercise, smoking cessation, reducing alcohol consumption, monitoring blood pressure regularly.     Symptoms: None    BP Goal:< 140/90 mmHg    BP Assessment:  BP at goal    Potential Reasons for BP too high: NA - Not applicable    BP Follow-Up Plan: Recheck BP in 6 months at pharmacy    Recommendation to Provider: none    Note completed by:  Sean Hughes, Pharm.D., [facility] [place], [phone]

## 2021-06-25 DIAGNOSIS — R00.0 SINUS TACHYCARDIA: ICD-10-CM

## 2021-06-25 DIAGNOSIS — I10 BENIGN ESSENTIAL HYPERTENSION: ICD-10-CM

## 2021-06-28 RX ORDER — LISINOPRIL 10 MG/1
TABLET ORAL
Qty: 90 TABLET | Refills: 1 | Status: SHIPPED | OUTPATIENT
Start: 2021-06-28 | End: 2021-10-19

## 2021-06-28 RX ORDER — METOPROLOL SUCCINATE 25 MG/1
TABLET, EXTENDED RELEASE ORAL
Qty: 45 TABLET | Refills: 1 | Status: SHIPPED | OUTPATIENT
Start: 2021-06-28 | End: 2021-12-15

## 2021-06-28 NOTE — TELEPHONE ENCOUNTER
Prescription approved per [facility] Refill Protocol.    Justa Hahn RN on 6/28/2021 at 2:15 PM

## 2021-08-18 ENCOUNTER — MYC MEDICAL ADVICE (OUTPATIENT)
Dept: ENDOCRINOLOGY | Facility: CLINIC | Age: 26
End: 2021-08-18

## 2021-08-23 ENCOUNTER — TELEPHONE (OUTPATIENT)
Dept: ENDOCRINOLOGY | Facility: CLINIC | Age: 26
End: 2021-08-23

## 2021-08-23 DIAGNOSIS — E10.9 TYPE 1 DIABETES MELLITUS NOT AT GOAL (H): ICD-10-CM

## 2021-08-23 NOTE — TELEPHONE ENCOUNTER
8/23 1st attempt.  LVM for patient to schedule an overdue 'diabetes return' follow up visit with Dr. De Dios.    Please assist patient in scheduling when she calls back.    Thanks    Chela Zavala  Pediatric Specialty /Adult Endocrinology  [facility]

## 2021-08-23 NOTE — TELEPHONE ENCOUNTER
insulin lispro (HUMALOG) 100 UNIT/ML vial    USES UP TO 75 UNITS PER DAY IN INSULIN PUMP FOR BASAL,BOLUS AND PRIMING INSULIN PUMP TUBING    Last Written Prescription Date:  10/7/20  Last Fill Quantity: 80 ml,   # refills: 2  Last Office Visit : 7/15/20  Future Office visit:  none    Routing refill request to provider for review/approval because:  Insulin refilled by Clinic RN

## 2021-08-27 NOTE — TELEPHONE ENCOUNTER
Form put in Dr De Dios's folder and will give to her when back in office next Wednesday.    Ophelia Rasmussen [facility]  Adult Endocrinology  [facility]

## 2021-09-01 NOTE — TELEPHONE ENCOUNTER
Form has been filled and faxed to the [place] [employer] at [phone]. Copy has been made and mailed to patient home address, original has been placed in folder to be scanned in one month.    Deyanira ROCA MA   [facility] Endocrine   [facility]

## 2021-09-03 NOTE — TELEPHONE ENCOUNTER
1st attempt, sent mychart to patient requesting she call to schedule a follow up appointment with Dr De Dios.    Amalia Hernandez  Surgical Specialties Procedure   [facility] [place]  9/3/2021 2:03 PM

## 2021-09-10 ENCOUNTER — VIRTUAL VISIT (OUTPATIENT)
Dept: ENDOCRINOLOGY | Facility: CLINIC | Age: 26
End: 2021-09-10
Payer: COMMERCIAL

## 2021-09-10 DIAGNOSIS — E04.9 GOITER: ICD-10-CM

## 2021-09-10 DIAGNOSIS — R80.9 TYPE 1 DIABETES MELLITUS WITH MICROALBUMINURIA (H): Primary | ICD-10-CM

## 2021-09-10 DIAGNOSIS — E10.29 TYPE 1 DIABETES MELLITUS WITH MICROALBUMINURIA (H): Primary | ICD-10-CM

## 2021-09-10 PROCEDURE — 99215 OFFICE O/P EST HI 40 MIN: CPT | Mod: 95 | Performed by: INTERNAL MEDICINE

## 2021-09-10 PROCEDURE — 95251 CONT GLUC MNTR ANALYSIS I&R: CPT | Performed by: INTERNAL MEDICINE

## 2021-09-10 NOTE — PROGRESS NOTES
Ophelia Rasmussen [facility]  Adult Endocrinology  [facility]

## 2021-09-10 NOTE — PROGRESS NOTES
Video-Visit Details    Type of service:  Video Visit  Start: 09/10/2021 04:31 pm  Stop: 09/10/2021 04:40 pm    Originating Location (pt. Location): Home  Distant Location (provider location):  [facility]   Platform used for Video Visit: Maryann    Due to the COVID 19 pandemic this visit was converted to a video visit in order to help prevent spread of infection in this patient and the general population.     The patient is seen in f/up for evaluation of diabetes. She was last seen in the clinic in 7/2020.    Feliciano Sahu is a 26 year old female diagnosed with type 1 diabetes in 2008, when she was found to be in DKA. Hemoglobin A1c used to be between 9.3 and 10.9 and it significantly decreased since 2016, to 8-8.5%. Most recent A1c was 7.4%, in November 2020.    Her diabetes is known to be complicated by intermittent microalbuminuria.  She was started on a DEXCOM sensor in the beginning of 2017.  Last fall, she transitioned to a Tandem insulin pump.    I reviewed the insulin pump records.  She has been using the pump in the control IQ mode 97% of time. Average sensor glucose is 145, with a range variable between 83 and 261.  87% of the glucose numbers are within target of  and there are no significant hypoglycemic episodes documented by the sensor.  Total daily insulin dose is 59 units, of which 61% is basal insulin, 31% is bolus, 3% is correction bolus and 4% is control IQ auto bolus.  With certain meals, there are repeated carb boluses and the patient does endorse entering fake carbs, maybe for approximately 20% of the boluses.  Sometimes, she is not sure whether or not she is going to complete her meal.  Denies having a bedtime snack.    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 Breakfast at 9:30 am, lunch at noon, dinner at variable times. At 2-3 pm she might have a snack.      Insulin pump (Tandem 9/2020) settings:  Basal rate at 12 midnight 1.5 units per  hour  Insulin to carb ratio 1 U per 7 grams  Sensitivity 30   Target 120    Work profile (not suing it):   Basal rate at 12 midnight 1.4 units per hour  Insulin to carb ratio 1 U per 7 grams  Sensitivity 30   Target 140    Diabetes complications:  Retinopathy: last eye exam 11/2020; mild DR   Nephropathy: h/o proteinuria dating back to 2010  Neuropathy: no numbness or tingling sensation   Only one in the 70s in the last couple of months.   Feels shaky, sweaty and dizzy when BG drops in the 80s. Lowest BG she remembers was 35. She denies prior episodes of loss of consciousness due to hypoglycemia.  She doesn't have glucagon at home.   She had one episode of diabetes ketoacidosis in 1999.    Past Medical History:   Diagnosis Date     Diabetes mellitus (H) 1998    Type 1     Diabetic eye exam (H) 08/09/2013    [facility]   Treated with Depo-Provera since, approximately, 2014    Past Surgical History:   Procedure Laterality Date     EYE SURGERY      lazy eye correction (strabismus?)     Current Medications   On depoprovera since 2014 (pt preferred not to have MP)       Current Outpatient Medications:      blood glucose monitoring (RACHEL CONTOUR NEXT) test strip, Use to test blood sugar 4 times daily or as directed., Disp: 100 strip, Rfl: 0     buPROPion (WELLBUTRIN XL) 150 MG 24 hr tablet, Take 1 tablet (150 mg) by mouth every morning, Disp: 90 tablet, Rfl: 3     cetirizine (ZYRTEC) 10 MG tablet, Take 1 tablet (10 mg) by mouth daily as needed for allergies, Disp: 90 tablet, Rfl: 1     Continuous Blood Gluc Sensor (DEXCOM G6 SENSOR) MISC, Change every 10 days., Disp: 9 each, Rfl: 3     Continuous Blood Gluc Transmit (DEXCOM G6 TRANSMITTER) MISC, Change every 3 months., Disp: 1 each, Rfl: 3     fluconazole (DIFLUCAN) 150 MG tablet, Take 1 tablet (150 mg) by mouth daily, Disp: 3 tablet, Rfl: 0     glucagon (GLUCAGON EMERGENCY) 1 MG kit, Inject 1 mg subcutaneously or intramuscularly., Disp: 1 each, Rfl: 3      "insulin lispro (HUMALOG) 100 UNIT/ML vial, USES UP TO 75 UNITS PER DAY IN INSULIN PUMP FOR BASAL,BOLUS AND PRIMING INSULIN PUMP TUBING, Disp: 30 mL, Rfl: 0     insulin syringe 31G X 5/16\" 0.5 ML MISC, Use 4-6 times daily in the event of pump failure, Disp: 50 each, Rfl: 1     levonorgest-eth estrad 91-Day (SEASONIQUE) 0.15-0.03 &0.01 MG tablet, Take 1 tablet by mouth daily, Disp: 91 tablet, Rfl: 7     lisinopril (ZESTRIL) 10 MG tablet, Take 1 tablet by mouth once daily, Disp: 90 tablet, Rfl: 1     metoprolol succinate ER (TOPROL-XL) 25 MG 24 hr tablet, Take 1/2 (one-half) tablet by mouth once daily, Disp: 45 tablet, Rfl: 1     varenicline (CHANTIX) 1 MG tablet, Take 1 tablet (1 mg) by mouth 2 times daily, Disp: 60 tablet, Rfl: 2    Current Facility-Administered Medications:      medroxyPROGESTERone (DEPO-PROVERA) injection 150 mg, 150 mg, Intramuscular, Q90 Days, Cecilia Banks, CLEMENCIA CNP, 150 mg at 11/25/20 1130    Family History   Mother has hypothyroidism. Father has hypercholesterolemia. Grandfather melanoma. No known family history of hypertension, death at a young age, stroke.     Social History  Single. No children. She denies smoking, drinking alcohol or using illicit drugs. Occupation: nurse at [employer].     Review of Systems   Systemic:             Weight stable   Eye:                       No eye symptoms   Anmol-Laryngeal:      No anmol-laryngeal symptoms, no dysphagia, no voice hoarseness, no cough      Breast:                   No breast symptoms  Cardiovascular:     No cardiovascular symptoms, no CP or palpitations   Pulmonary:            No pulmonary symptoms, no cough or SOB    Gastrointestinal:    No gastrointestinal symptoms, no diarrhea or constipation   Genitourinary:        No genitourinary symptoms    Endocrine:             No endocrine symptoms, no heat or cold intolerance, no increased sweating; MP absent on the BCP   Neurological:         rare headaches, no " tremor, no dizziness     Musculoskeletal:   No joint pain or muscle cramps   Skin:                      No skin symptoms   Psychological:       Gets anxious easily           Vital Signs     Previous Weights:    Wt Readings from Last 10 Encounters:   05/04/21 67.1 kg (148 lb)   03/25/21 69.1 kg (152 lb 4 oz)   01/06/21 68.9 kg (152 lb)   11/25/20 68.3 kg (150 lb 8 oz)   08/28/20 67.6 kg (149 lb)   06/05/20 65.9 kg (145 lb 6 oz)   03/06/20 63.9 kg (140 lb 12.8 oz)   07/10/19 67.3 kg (148 lb 6.4 oz)   02/14/19 65.7 kg (144 lb 12.8 oz)   07/24/18 66.2 kg (145 lb 15.1 oz)     Vital Signs:  There were no vitals taken for this visit.    Physical Exam  General Appearance: alert, no distress noted   Eyes: grossly normal to inspection, conjunctivae and sclerae normal, no lid lag or stare   Respiratory: no audible wheeze, cough, or visible cyanosis.  No visible retractions or increased work of breathing.  Able to speak fully in complete sentences.  Neurological: Cranial nerves grossly intact, mentation intact and speech normal; no tremor of the outstretched hands   Skin: no lesions on exposed skin   Psychological: mentation appears normal, affect normal, judgement and insight intact, normal speech and appearance well-groomed    Lab Results  I reviewed prior lab results documented in Epic.  Lab Results   Component Value Date    A1C 7.4 (H) 11/10/2020    A1C 7.4 (A) 07/10/2019    A1C 7.4 07/24/2018    A1C 7.8 01/02/2018    A1C 8.1 07/26/2016       Hemoglobin   Date Value Ref Range Status   09/22/2008 16.0 (H) 11.7 - 15.7 g/dL Final     Hematocrit   Date Value Ref Range Status   11/10/2020 43.7 35.0 - 47.0 % Final     Cholesterol   Date Value Ref Range Status   11/10/2020 152 <200 mg/dL Final     Cholesterol/HDL Ratio   Date Value Ref Range Status   01/03/2013 5.0 0.0 - 5.0 Final     HDL Cholesterol   Date Value Ref Range Status   11/10/2020 46 (L) >49 mg/dL Final     LDL Cholesterol Calculated   Date Value Ref Range Status    11/10/2020 71 <100 mg/dL Final     Comment:     Desirable:       <100 mg/dl     VLDL-Cholesterol   Date Value Ref Range Status   01/03/2013 31 (H) 0 - 30 mg/dL Final     Triglycerides   Date Value Ref Range Status   11/10/2020 177 (H) <150 mg/dL Final     Comment:     Borderline high:  150-199 mg/dl  High:             200-499 mg/dl  Very high:       >499 mg/dl  Fasting specimen       Albumin Urine mg/L   Date Value Ref Range Status   11/10/2020 104 mg/L Final     TSH   Date Value Ref Range Status   11/10/2020 3.27 0.40 - 4.00 mU/L Final         Last Basic Metabolic Panel:    Sodium   Date Value Ref Range Status   11/10/2020 137 133 - 144 mmol/L Final     Potassium   Date Value Ref Range Status   11/10/2020 4.3 3.4 - 5.3 mmol/L Final     Chloride   Date Value Ref Range Status   11/10/2020 108 94 - 109 mmol/L Final     Calcium   Date Value Ref Range Status   11/10/2020 9.0 8.5 - 10.1 mg/dL Final     Carbon Dioxide   Date Value Ref Range Status   11/10/2020 21 20 - 32 mmol/L Final     Urea Nitrogen   Date Value Ref Range Status   11/10/2020 10 7 - 30 mg/dL Final     Creatinine   Date Value Ref Range Status   11/10/2020 0.59 0.52 - 1.04 mg/dL Final     GFR Estimate   Date Value Ref Range Status   11/10/2020 >90 >60 mL/min/[1.73_m2] Final     Comment:     Non  GFR Calc  Starting 12/18/2018, serum creatinine based estimated GFR (eGFR) will be   calculated using the Chronic Kidney Disease Epidemiology Collaboration   (CKD-EPI) equation.       Glucose   Date Value Ref Range Status   11/10/2020 182 (H) 70 - 99 mg/dL Final     Comment:     Fasting specimen       AST   Date Value Ref Range Status   11/10/2020 27 0 - 45 U/L Final     ALT   Date Value Ref Range Status   11/10/2020 53 (H) 0 - 50 U/L Final     Albumin   Date Value Ref Range Status   11/10/2020 3.9 3.4 - 5.0 g/dL Final       Assessment     1. Type 1 diabetes with overall good glycemic control, complicated by mild diabetic retinopathy and  intermittent microalbuminuria.   I recommended to consider minimally decreasing the basal rate during daytime from 1.5 to 1.4 units/h, from 8 AM to 10 PM.  I have also recommended to change insulin to carbohydrate ratio to 1 unit per 6.5 g of carbohydrates and try to bolus for all meals prior to food intake, avoiding entering fake carbs.  She might require even a tighter insulin to carb ratio, of 1 unit per 6 g carbohydrates.  Recommendations:  Have the pump downloaded for my review as needed  Follow-up lab work: Urine microalbumin, lipid panel, CMP, hematocrit and A1c    2.  Mild goiter on prior clinical exam  Clinically, the patient is euthyroid.  Follow-up reflex TSH.    Orders Placed This Encounter   Procedures     Continuous Glucose Monitoring >=72 hours PHYS INTERP     Comprehensive metabolic panel     Lipid panel reflex to direct LDL Fasting     Hematocrit     Albumin Random Urine Quantitative with Creat Ratio     Hemoglobin A1c     TSH with free T4 reflex     42 minutes spent on the date of the encounter doing chart review, history and exam, documentation and further activities as noted above.

## 2021-09-10 NOTE — NURSING NOTE
1:48: Left message for patient at [phone] to return call to clinic for check-in.  Shahana Herring, CMA

## 2021-09-10 NOTE — PROGRESS NOTES
See attachment on 9/9/2021 [employer] encounter for download.  Tandem account is now linked and blood sugar readings are available.    Feliciano is a 26 year old who is being evaluated via a billable video visit.      How would you like to obtain your AVS? [employer]  If the video visit is dropped, the invitation should be resent by: Send to e-mail at: [email or web address]  Will anyone else be joining your video visit? No      Rowan Farrar CMA  Adult Endocrinology  [facility]

## 2021-09-10 NOTE — LETTER
9/10/2021         RE: Feliciano Sahu  [address]  [facility] [address]        Dear Colleague,    Thank you for referring your patient, Feliciano Sahu, to the [facility]. Please see a copy of my visit note below.        Ophelia Rasmussen [facility]  Adult Endocrinology  [facility]      Video-Visit Details    Type of service:  Video Visit  Start: 09/10/2021 04:31 pm  Stop: 09/10/2021 04:40 pm    Originating Location (pt. Location): Home  Distant Location (provider location):  [facility]   Platform used for Video Visit: AmWell    Due to the COVID 19 pandemic this visit was converted to a video visit in order to help prevent spread of infection in this patient and the general population.     The patient is seen in f/up for evaluation of diabetes. She was last seen in the clinic in 7/2020.    Feliciano Sahu is a 26 year old female diagnosed with type 1 diabetes in 2008, when she was found to be in DKA. Hemoglobin A1c used to be between 9.3 and 10.9 and it significantly decreased since 2016, to 8-8.5%. Most recent A1c was 7.4%, in November 2020.    Her diabetes is known to be complicated by intermittent microalbuminuria.  She was started on a DEXCOM sensor in the beginning of 2017.  Last fall, she transitioned to a Tandem insulin pump.    I reviewed the insulin pump records.  She has been using the pump in the control IQ mode 97% of time. Average sensor glucose is 145, with a range variable between 83 and 261.  87% of the glucose numbers are within target of  and there are no significant hypoglycemic episodes documented by the sensor.  Total daily insulin dose is 59 units, of which 61% is basal insulin, 31% is bolus, 3% is correction bolus and 4% is control IQ auto bolus.  With certain meals, there are repeated carb boluses and the patient does endorse entering fake carbs, maybe for approximately 20% of the boluses.  Sometimes, she is not sure  whether or not she is going to complete her meal.  Denies having a bedtime snack.    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 Breakfast at 9:30 am, lunch at noon, dinner at variable times. At 2-3 pm she might have a snack.      Insulin pump (Tandem 9/2020) settings:  Basal rate at 12 midnight 1.5 units per hour  Insulin to carb ratio 1 U per 7 grams  Sensitivity 30   Target 120    Work profile (not suing it):   Basal rate at 12 midnight 1.4 units per hour  Insulin to carb ratio 1 U per 7 grams  Sensitivity 30   Target 140    Diabetes complications:  Retinopathy: last eye exam 11/2020; mild DR   Nephropathy: h/o proteinuria dating back to 2010  Neuropathy: no numbness or tingling sensation   Only one in the 70s in the last couple of months.   Feels shaky, sweaty and dizzy when BG drops in the 80s. Lowest BG she remembers was 35. She denies prior episodes of loss of consciousness due to hypoglycemia.  She doesn't have glucagon at home.   She had one episode of diabetes ketoacidosis in 1999.    Past Medical History:   Diagnosis Date     Diabetes mellitus (H) 1998    Type 1     Diabetic eye exam (H) 08/09/2013    [facility]   Treated with Depo-Provera since, approximately, 2014    Past Surgical History:   Procedure Laterality Date     EYE SURGERY      lazy eye correction (strabismus?)     Current Medications   On depoprovera since 2014 (pt preferred not to have MP)       Current Outpatient Medications:      blood glucose monitoring (RACHEL CONTOUR NEXT) test strip, Use to test blood sugar 4 times daily or as directed., Disp: 100 strip, Rfl: 0     buPROPion (WELLBUTRIN XL) 150 MG 24 hr tablet, Take 1 tablet (150 mg) by mouth every morning, Disp: 90 tablet, Rfl: 3     cetirizine (ZYRTEC) 10 MG tablet, Take 1 tablet (10 mg) by mouth daily as needed for allergies, Disp: 90 tablet, Rfl: 1     Continuous Blood Gluc Sensor (DEXCOM G6 SENSOR) MISC, Change every 10 days., Disp: 9 each,  "Rfl: 3     Continuous Blood Gluc Transmit (DEXCOM G6 TRANSMITTER) MISC, Change every 3 months., Disp: 1 each, Rfl: 3     fluconazole (DIFLUCAN) 150 MG tablet, Take 1 tablet (150 mg) by mouth daily, Disp: 3 tablet, Rfl: 0     glucagon (GLUCAGON EMERGENCY) 1 MG kit, Inject 1 mg subcutaneously or intramuscularly., Disp: 1 each, Rfl: 3     insulin lispro (HUMALOG) 100 UNIT/ML vial, USES UP TO 75 UNITS PER DAY IN INSULIN PUMP FOR BASAL,BOLUS AND PRIMING INSULIN PUMP TUBING, Disp: 30 mL, Rfl: 0     insulin syringe 31G X 5/16\" 0.5 ML MISC, Use 4-6 times daily in the event of pump failure, Disp: 50 each, Rfl: 1     levonorgest-eth estrad 91-Day (SEASONIQUE) 0.15-0.03 &0.01 MG tablet, Take 1 tablet by mouth daily, Disp: 91 tablet, Rfl: 7     lisinopril (ZESTRIL) 10 MG tablet, Take 1 tablet by mouth once daily, Disp: 90 tablet, Rfl: 1     metoprolol succinate ER (TOPROL-XL) 25 MG 24 hr tablet, Take 1/2 (one-half) tablet by mouth once daily, Disp: 45 tablet, Rfl: 1     varenicline (CHANTIX) 1 MG tablet, Take 1 tablet (1 mg) by mouth 2 times daily, Disp: 60 tablet, Rfl: 2    Current Facility-Administered Medications:      medroxyPROGESTERone (DEPO-PROVERA) injection 150 mg, 150 mg, Intramuscular, Q90 Days, Cecilia Banks, CLEMENCIA CNP, 150 mg at 11/25/20 1130    Family History   Mother has hypothyroidism. Father has hypercholesterolemia. Grandfather melanoma. No known family history of hypertension, death at a young age, stroke.     Social History  Single. No children. She denies smoking, drinking alcohol or using illicit drugs. Occupation: nurse at [employer].     Review of Systems   Systemic:             Weight stable   Eye:                       No eye symptoms   Anmol-Laryngeal:      No anmol-laryngeal symptoms, no dysphagia, no voice hoarseness, no cough      Breast:                   No breast symptoms  Cardiovascular:     No cardiovascular symptoms, no CP or palpitations   Pulmonary:            No " pulmonary symptoms, no cough or SOB    Gastrointestinal:    No gastrointestinal symptoms, no diarrhea or constipation   Genitourinary:        No genitourinary symptoms    Endocrine:             No endocrine symptoms, no heat or cold intolerance, no increased sweating; MP absent on the BCP   Neurological:         rare headaches, no tremor, no dizziness     Musculoskeletal:   No joint pain or muscle cramps   Skin:                      No skin symptoms   Psychological:       Gets anxious easily           Vital Signs     Previous Weights:    Wt Readings from Last 10 Encounters:   05/04/21 67.1 kg (148 lb)   03/25/21 69.1 kg (152 lb 4 oz)   01/06/21 68.9 kg (152 lb)   11/25/20 68.3 kg (150 lb 8 oz)   08/28/20 67.6 kg (149 lb)   06/05/20 65.9 kg (145 lb 6 oz)   03/06/20 63.9 kg (140 lb 12.8 oz)   07/10/19 67.3 kg (148 lb 6.4 oz)   02/14/19 65.7 kg (144 lb 12.8 oz)   07/24/18 66.2 kg (145 lb 15.1 oz)     Vital Signs:  There were no vitals taken for this visit.    Physical Exam  General Appearance: alert, no distress noted   Eyes: grossly normal to inspection, conjunctivae and sclerae normal, no lid lag or stare   Respiratory: no audible wheeze, cough, or visible cyanosis.  No visible retractions or increased work of breathing.  Able to speak fully in complete sentences.  Neurological: Cranial nerves grossly intact, mentation intact and speech normal; no tremor of the outstretched hands   Skin: no lesions on exposed skin   Psychological: mentation appears normal, affect normal, judgement and insight intact, normal speech and appearance well-groomed    Lab Results  I reviewed prior lab results documented in Epic.  Lab Results   Component Value Date    A1C 7.4 (H) 11/10/2020    A1C 7.4 (A) 07/10/2019    A1C 7.4 07/24/2018    A1C 7.8 01/02/2018    A1C 8.1 07/26/2016       Hemoglobin   Date Value Ref Range Status   09/22/2008 16.0 (H) 11.7 - 15.7 g/dL Final     Hematocrit   Date Value Ref Range Status   11/10/2020 43.7 35.0 -  47.0 % Final     Cholesterol   Date Value Ref Range Status   11/10/2020 152 <200 mg/dL Final     Cholesterol/HDL Ratio   Date Value Ref Range Status   01/03/2013 5.0 0.0 - 5.0 Final     HDL Cholesterol   Date Value Ref Range Status   11/10/2020 46 (L) >49 mg/dL Final     LDL Cholesterol Calculated   Date Value Ref Range Status   11/10/2020 71 <100 mg/dL Final     Comment:     Desirable:       <100 mg/dl     VLDL-Cholesterol   Date Value Ref Range Status   01/03/2013 31 (H) 0 - 30 mg/dL Final     Triglycerides   Date Value Ref Range Status   11/10/2020 177 (H) <150 mg/dL Final     Comment:     Borderline high:  150-199 mg/dl  High:             200-499 mg/dl  Very high:       >499 mg/dl  Fasting specimen       Albumin Urine mg/L   Date Value Ref Range Status   11/10/2020 104 mg/L Final     TSH   Date Value Ref Range Status   11/10/2020 3.27 0.40 - 4.00 mU/L Final         Last Basic Metabolic Panel:    Sodium   Date Value Ref Range Status   11/10/2020 137 133 - 144 mmol/L Final     Potassium   Date Value Ref Range Status   11/10/2020 4.3 3.4 - 5.3 mmol/L Final     Chloride   Date Value Ref Range Status   11/10/2020 108 94 - 109 mmol/L Final     Calcium   Date Value Ref Range Status   11/10/2020 9.0 8.5 - 10.1 mg/dL Final     Carbon Dioxide   Date Value Ref Range Status   11/10/2020 21 20 - 32 mmol/L Final     Urea Nitrogen   Date Value Ref Range Status   11/10/2020 10 7 - 30 mg/dL Final     Creatinine   Date Value Ref Range Status   11/10/2020 0.59 0.52 - 1.04 mg/dL Final     GFR Estimate   Date Value Ref Range Status   11/10/2020 >90 >60 mL/min/[1.73_m2] Final     Comment:     Non  GFR Calc  Starting 12/18/2018, serum creatinine based estimated GFR (eGFR) will be   calculated using the Chronic Kidney Disease Epidemiology Collaboration   (CKD-EPI) equation.       Glucose   Date Value Ref Range Status   11/10/2020 182 (H) 70 - 99 mg/dL Final     Comment:     Fasting specimen       AST   Date Value Ref  Range Status   11/10/2020 27 0 - 45 U/L Final     ALT   Date Value Ref Range Status   11/10/2020 53 (H) 0 - 50 U/L Final     Albumin   Date Value Ref Range Status   11/10/2020 3.9 3.4 - 5.0 g/dL Final       Assessment     1. Type 1 diabetes with overall good glycemic control, complicated by mild diabetic retinopathy and intermittent microalbuminuria.   I recommended to consider minimally decreasing the basal rate during daytime from 1.5 to 1.4 units/h, from 8 AM to 10 PM.  I have also recommended to change insulin to carbohydrate ratio to 1 unit per 6.5 g of carbohydrates and try to bolus for all meals prior to food intake, avoiding entering fake carbs.  She might require even a tighter insulin to carb ratio, of 1 unit per 6 g carbohydrates.  Recommendations:  Have the pump downloaded for my review as needed  Follow-up lab work: Urine microalbumin, lipid panel, CMP, hematocrit and A1c    2.  Mild goiter on prior clinical exam  Clinically, the patient is euthyroid.  Follow-up reflex TSH.    Orders Placed This Encounter   Procedures     Continuous Glucose Monitoring >=72 hours PHYS INTERP     Comprehensive metabolic panel     Lipid panel reflex to direct LDL Fasting     Hematocrit     Albumin Random Urine Quantitative with Creat Ratio     Hemoglobin A1c     TSH with free T4 reflex     42 minutes spent on the date of the encounter doing chart review, history and exam, documentation and further activities as noted above.         See attachment on 9/9/2021 [employer] encounter for download.  Tandem account is now linked and blood sugar readings are available.    Feliciano is a 26 year old who is being evaluated via a billable video visit.      How would you like to obtain your AVS? [employer]  If the video visit is dropped, the invitation should be resent by: Send to e-mail at: [email or web address]  Will anyone else be joining your video visit? No      Rowan Farrar, [facility]  Adult Endocrinology  [facility],  [place]        Again, thank you for allowing me to participate in the care of your patient.        Sincerely,        Eda De Dios MD

## 2021-09-11 ENCOUNTER — HEALTH MAINTENANCE LETTER (OUTPATIENT)
Age: 26
End: 2021-09-11

## 2021-10-05 DIAGNOSIS — E10.9 TYPE 1 DIABETES MELLITUS NOT AT GOAL (H): ICD-10-CM

## 2021-10-05 NOTE — TELEPHONE ENCOUNTER
insulin lispro (HUMALOG) 100 UNIT/ML vial        Last Written Prescription Date:  08/23/21  Last Fill Quantity: 30mL,   # refills: 0  Last Office Visit : 09/10/21  Future Office visit:  None scheduled    Routing refill request to provider for review/approval because:  Insulin - refilled per clinic

## 2021-10-19 ENCOUNTER — LAB (OUTPATIENT)
Dept: LAB | Facility: CLINIC | Age: 26
End: 2021-10-19
Payer: COMMERCIAL

## 2021-10-19 DIAGNOSIS — E10.29 TYPE 1 DIABETES MELLITUS WITH MICROALBUMINURIA (H): ICD-10-CM

## 2021-10-19 DIAGNOSIS — I10 BENIGN ESSENTIAL HYPERTENSION: ICD-10-CM

## 2021-10-19 DIAGNOSIS — E10.21 TYPE 1 DIABETES MELLITUS WITH NEPHROPATHY NOT AT GOAL (H): Primary | ICD-10-CM

## 2021-10-19 DIAGNOSIS — R80.9 TYPE 1 DIABETES MELLITUS WITH MICROALBUMINURIA (H): ICD-10-CM

## 2021-10-19 LAB
ALBUMIN SERPL-MCNC: 3.8 G/DL (ref 3.4–5)
ALP SERPL-CCNC: 40 U/L (ref 40–150)
ALT SERPL W P-5'-P-CCNC: 29 U/L (ref 0–50)
ANION GAP SERPL CALCULATED.3IONS-SCNC: 3 MMOL/L (ref 3–14)
AST SERPL W P-5'-P-CCNC: 17 U/L (ref 0–45)
BILIRUB SERPL-MCNC: 0.3 MG/DL (ref 0.2–1.3)
BUN SERPL-MCNC: 11 MG/DL (ref 7–30)
CALCIUM SERPL-MCNC: 8.6 MG/DL (ref 8.5–10.1)
CHLORIDE BLD-SCNC: 110 MMOL/L (ref 94–109)
CHOLEST SERPL-MCNC: 173 MG/DL
CO2 SERPL-SCNC: 25 MMOL/L (ref 20–32)
CREAT SERPL-MCNC: 0.83 MG/DL (ref 0.52–1.04)
CREAT UR-MCNC: 255 MG/DL
FASTING STATUS PATIENT QL REPORTED: YES
GFR SERPL CREATININE-BSD FRML MDRD: >90 ML/MIN/1.73M2
GLUCOSE BLD-MCNC: 156 MG/DL (ref 70–99)
HBA1C MFR BLD: 7 % (ref 0–5.6)
HCT VFR BLD AUTO: 39.6 % (ref 35–47)
HDLC SERPL-MCNC: 44 MG/DL
LDLC SERPL CALC-MCNC: 101 MG/DL
MICROALBUMIN UR-MCNC: 67 MG/L
MICROALBUMIN/CREAT UR: 26.27 MG/G CR (ref 0–25)
NONHDLC SERPL-MCNC: 129 MG/DL
POTASSIUM BLD-SCNC: 4.3 MMOL/L (ref 3.4–5.3)
PROT SERPL-MCNC: 7.6 G/DL (ref 6.8–8.8)
SODIUM SERPL-SCNC: 138 MMOL/L (ref 133–144)
TRIGL SERPL-MCNC: 140 MG/DL
TSH SERPL DL<=0.005 MIU/L-ACNC: 2.32 MU/L (ref 0.4–4)

## 2021-10-19 PROCEDURE — 85014 HEMATOCRIT: CPT

## 2021-10-19 PROCEDURE — 82043 UR ALBUMIN QUANTITATIVE: CPT

## 2021-10-19 PROCEDURE — 84443 ASSAY THYROID STIM HORMONE: CPT

## 2021-10-19 PROCEDURE — 36415 COLL VENOUS BLD VENIPUNCTURE: CPT

## 2021-10-19 PROCEDURE — 83036 HEMOGLOBIN GLYCOSYLATED A1C: CPT

## 2021-10-19 PROCEDURE — 80061 LIPID PANEL: CPT

## 2021-10-19 PROCEDURE — 80053 COMPREHEN METABOLIC PANEL: CPT

## 2021-10-19 RX ORDER — LISINOPRIL 10 MG/1
10 TABLET ORAL 2 TIMES DAILY
Qty: 180 TABLET | Refills: 3 | Status: SHIPPED | OUTPATIENT
Start: 2021-10-19 | End: 2022-12-15

## 2021-10-19 NOTE — RESULT ENCOUNTER NOTE
The urine analysis remains positive for a small amount of proteins. To further address this, I recommend to increase the dose of lisinopril from 10 mg to 10 mg twice daily. This is a medication which is effective in protecting the kidneys from diabetic damage, together with a good glycemic control. You can do this gradually, by initially increasing the dose to 10 mg in am and 5 pm in the evening. After being on this higher dose of lisinopril for 2-3 months, please have a urine analysis done again.   The other labs are within normal limits.

## 2021-11-24 ENCOUNTER — MYC MEDICAL ADVICE (OUTPATIENT)
Dept: ENDOCRINOLOGY | Facility: CLINIC | Age: 26
End: 2021-11-24
Payer: COMMERCIAL

## 2021-12-07 ENCOUNTER — APPOINTMENT (OUTPATIENT)
Dept: LAB | Facility: CLINIC | Age: 26
End: 2021-12-07
Payer: COMMERCIAL

## 2021-12-09 ENCOUNTER — MYC MEDICAL ADVICE (OUTPATIENT)
Dept: FAMILY MEDICINE | Facility: OTHER | Age: 26
End: 2021-12-09
Payer: COMMERCIAL

## 2021-12-09 DIAGNOSIS — E10.9 TYPE 1 DIABETES MELLITUS NOT AT GOAL (H): ICD-10-CM

## 2021-12-09 NOTE — TELEPHONE ENCOUNTER
insulin lispro (HUMALOG) 100 UNIT/ML vial        Last Written Prescription Date:  10/05/21  Last Fill Quantity: 30mL,   # refills: 1  Last Office Visit : 09/10/21  Future Office visit:  12/15/21    Routing refill request to provider for review/approval because:  Insulin - refilled per clinic

## 2021-12-10 ASSESSMENT — ENCOUNTER SYMPTOMS
HOT FLASHES: 0
DECREASED LIBIDO: 0

## 2021-12-14 NOTE — PATIENT INSTRUCTIONS
[facility]  Anna Black RN, Diabetes Educator: [phone]  Clinic Nurses ESVIN Gomes; CMA's: Deyanira Donahue and Ministerio [phone]  Clinic Fax: [phone]  On-Call Endocrine at the [place] (after hours/weekends): [phone] option 4  Scheduling Line: [phone]   Please call the number below to schedule your labs.      Appointment Reminders:  * Please bring meter with for staff to download  * If you are due ONLY for an A1C, it is scheduled with the nurse and will be done in clinic. You do not need to schedule a lab appointment. Fasting is not required for an A1C.  * Refill request should be submitted to your pharmacy. They will contact clinic for approval.      Pregnancy BG targets:  Fasting blood glucose concentration ?95 mg/dL  One-hour postprandial blood glucose concentration ?140 mg/dL  Two-hour postprandial glucose concentration ?120 mg/dL  A1C ?6.5 percent (ideally, ?6 percent)  Check BG before breakfast, 2 hrs after meals and at bedtime. Occasionally, also check the prelunch or predinner BG.   Have the numbers sent to us in 1 week.

## 2021-12-14 NOTE — PROGRESS NOTES
Outcome for 12/14/21 3:00 PM :  The patient is currently sharing data via Dexcom Clarity and the reports have been printed. Patient needs to upload Tandem, message has been sent with the instructions of how to do it.    Rowan Farrar [facility]  Adult Endocrinology  [facility]

## 2021-12-15 ENCOUNTER — OFFICE VISIT (OUTPATIENT)
Dept: ENDOCRINOLOGY | Facility: CLINIC | Age: 26
End: 2021-12-15
Payer: COMMERCIAL

## 2021-12-15 VITALS
WEIGHT: 139.2 LBS | DIASTOLIC BLOOD PRESSURE: 91 MMHG | HEART RATE: 124 BPM | BODY MASS INDEX: 25.29 KG/M2 | OXYGEN SATURATION: 97 % | SYSTOLIC BLOOD PRESSURE: 156 MMHG

## 2021-12-15 DIAGNOSIS — I10 HYPERTENSION, UNCONTROLLED: ICD-10-CM

## 2021-12-15 DIAGNOSIS — E10.29 TYPE 1 DIABETES MELLITUS WITH MICROALBUMINURIA (H): Primary | ICD-10-CM

## 2021-12-15 DIAGNOSIS — I10 BENIGN ESSENTIAL HYPERTENSION: ICD-10-CM

## 2021-12-15 DIAGNOSIS — R80.9 TYPE 1 DIABETES MELLITUS WITH MICROALBUMINURIA (H): Primary | ICD-10-CM

## 2021-12-15 PROCEDURE — 99215 OFFICE O/P EST HI 40 MIN: CPT | Performed by: INTERNAL MEDICINE

## 2021-12-15 PROCEDURE — 95251 CONT GLUC MNTR ANALYSIS I&R: CPT | Performed by: INTERNAL MEDICINE

## 2021-12-15 RX ORDER — PROCHLORPERAZINE 25 MG/1
SUPPOSITORY RECTAL
Qty: 1 EACH | Refills: 3 | Status: SHIPPED | OUTPATIENT
Start: 2021-12-15 | End: 2022-07-01

## 2021-12-15 RX ORDER — METOPROLOL SUCCINATE 25 MG/1
75 TABLET, EXTENDED RELEASE ORAL DAILY
Qty: 270 TABLET | Refills: 3 | Status: SHIPPED | OUTPATIENT
Start: 2021-12-15 | End: 2022-12-15

## 2021-12-15 RX ORDER — PROCHLORPERAZINE 25 MG/1
SUPPOSITORY RECTAL
Qty: 9 EACH | Refills: 3 | Status: SHIPPED | OUTPATIENT
Start: 2021-12-15 | End: 2022-01-25

## 2021-12-15 NOTE — LETTER
12/15/2021         RE: Feliciano Sahu  [id number]  [facility] [id number]        Dear Colleague,    Thank you for referring your patient, Feliciano Sahu, to the [facility]. Please see a copy of my visit note below.    Outcome for 12/14/21 3:00 PM :  The patient is currently sharing data via Dexcom Clarity and the reports have been printed. Patient needs to upload Tandem, message has been sent with the instructions of how to do it.    Rowan Farrar, [facility]  Adult Endocrinology  [facility]        The patient is seen in f/up for evaluation of diabetes. She was last seen in the clinic in 7/2020.    Feliciano Sahu is a 26 year old female diagnosed with type 1 diabetes in 2008, when she was found to be in DKA. Hemoglobin A1c used to be between 9.3 and 10.9 and it significantly decreased since 2016, to 8-8.5%. Most recent A1c was 7.4%, in November 2020.    Today, it was 7%. Her diabetes is known to be complicated by microalbuminuria.    Insulin pump (Tandem 9/2020) settings:  Basal rate at 12 midnight 1.5 units per hour  Insulin to carb ratio 1 U per 7 grams  Sensitivity 30   Target 120    Work profile (not using it):   Basal rate at 12 midnight 1.4 units per hour  Insulin to carb ratio 1 U per 7 grams  Sensitivity 30   Target 140    I reviewed the insulin pump and sensor records.  On the sensor, average glucose is 148, with a standard deviation of 30, corresponding to a GMI of 6.8%.  81% of the glucose numbers are within target of .  The sensor reveals a tendency towards mild postprandial hyperglycemia, mainly following lunch.  There are no significant hypoglycemic episodes documented by the sensor.  On the pump, total daily insulin dose is 58 units, of which 64% is basal insulin, 5% is correction bolus and 4% is control IQ bolus.  She has been using the pump in the control IQ mode 96% of time.  For most of the meals, there are 2-4 boluses which  occurr over a period of 1 to 2 hours, on average.  As per patient, some of them are fake carbs, in order to bring her blood sugar down.  Recently, she has been trying to bolus twice for food intake as she is not sure if she is going to end up completing her meal.    She is considering pursuing pregnancy.  She wants to discuss about diabetes control during pregnancy and possible complications.    Diabetes complications:  Retinopathy: last eye exam 11/2020; mild DR. Eye exam scheduled tomorrow.   Nephropathy: h/o proteinuria dating back to 2010 - gradually improving   Neuropathy: no numbness or tingling sensation   She has been rarely experiencing lows, less then once a month.   Feels shaky, sweaty and dizzy when BG drops in the 80s. Lowest BG she remembers was 35. She denies prior episodes of loss of consciousness due to hypoglycemia.  She does have glucagon at home.  She had one episode of diabetes ketoacidosis in 1999.    Hypertension, currently treated with 10 mg lisinopril daily and half a tablet of 25 mg metoprolol succinate daily.  She has not been checking her blood pressure.  She attributes the elevated blood pressure from today to the stress of being in time for the clinic visit.    Past Medical History:   Diagnosis Date     Diabetes mellitus (H) 1998    Type 1     Diabetic eye exam (H) 08/09/2013    [facility]   Treated with Depo-Provera since, approximately, 2014    Past Surgical History:   Procedure Laterality Date     EYE SURGERY      lazy eye correction (strabismus?)     Current Medications       Current Outpatient Medications:      blood glucose monitoring (RACHEL CONTOUR NEXT) test strip, Use to test blood sugar 4 times daily or as directed., Disp: 100 strip, Rfl: 0     buPROPion (WELLBUTRIN XL) 150 MG 24 hr tablet, Take 1 tablet (150 mg) by mouth every morning, Disp: 90 tablet, Rfl: 3     cetirizine (ZYRTEC) 10 MG tablet, Take 1 tablet (10 mg) by mouth daily as needed for allergies, Disp: 90  "tablet, Rfl: 1     Continuous Blood Gluc Sensor (DEXCOM G6 SENSOR) MISC, Change every 10 days., Disp: 9 each, Rfl: 3     Continuous Blood Gluc Transmit (DEXCOM G6 TRANSMITTER) MISC, Change every 3 months., Disp: 1 each, Rfl: 3     glucagon (GLUCAGON EMERGENCY) 1 MG kit, Inject 1 mg subcutaneously or intramuscularly., Disp: 1 each, Rfl: 3     insulin lispro (HUMALOG) 100 UNIT/ML vial, USES UP TO 75 UNITS PER DAY IN INSULIN PUMP FOR BASAL,BOLUS AND PRIMING INSULIN PUMP TUBING, Disp: 70 mL, Rfl: 1     levonorgest-eth estrad 91-Day (SEASONIQUE) 0.15-0.03 &0.01 MG tablet, Take 1 tablet by mouth daily, Disp: 91 tablet, Rfl: 7     lisinopril (ZESTRIL) 10 MG tablet, Take 1 tablet (10 mg) by mouth 2 times daily, Disp: 180 tablet, Rfl: 3     metoprolol succinate ER (TOPROL-XL) 25 MG 24 hr tablet, Take 1/2 (one-half) tablet by mouth once daily, Disp: 45 tablet, Rfl: 1     fluconazole (DIFLUCAN) 150 MG tablet, Take 1 tablet (150 mg) by mouth daily (Patient not taking: Reported on 9/10/2021), Disp: 3 tablet, Rfl: 0     insulin syringe 31G X 5/16\" 0.5 ML MISC, Use 4-6 times daily in the event of pump failure (Patient not taking: Reported on 9/10/2021), Disp: 50 each, Rfl: 1    Current Facility-Administered Medications:      medroxyPROGESTERone (DEPO-PROVERA) injection 150 mg, 150 mg, Intramuscular, Q90 Days, Cecilia Banks APRN CNP, 150 mg at 11/25/20 1130    Family History   Mother has hypothyroidism. Father has hypercholesterolemia. Grandfather melanoma. No known family history of hypertension, death at a young age, stroke.     Social History  Single. No children. She denies smoking, drinking alcohol or using illicit drugs. Occupation: nurse at [employer].     Review of Systems   Systemic:             Weight down 9 lbs since May this year - partially intentional   Eye:                       No eye symptoms   Anmol-Laryngeal:      No anmol-laryngeal symptoms, no dysphagia, no voice hoarseness, no " cough      Breast:                   No breast symptoms  Cardiovascular:     No cardiovascular symptoms, no CP or palpitations   Pulmonary:            No pulmonary symptoms, no cough or SOB    Gastrointestinal:    No gastrointestinal symptoms, no diarrhea or constipation   Genitourinary:        No genitourinary symptoms    Endocrine:             No endocrine symptoms, no heat or cold intolerance, no increased sweating; MP absent on the BCP   Neurological:         rare headaches, no tremor, no dizziness     Musculoskeletal:   No joint pain or muscle cramps   Skin:                      No skin symptoms   Psychological:       Gets anxious easily           Vital Signs     Previous Weights:    Wt Readings from Last 10 Encounters:   12/15/21 63.1 kg (139 lb 3.2 oz)   05/04/21 67.1 kg (148 lb)   03/25/21 69.1 kg (152 lb 4 oz)   01/06/21 68.9 kg (152 lb)   11/25/20 68.3 kg (150 lb 8 oz)   08/28/20 67.6 kg (149 lb)   06/05/20 65.9 kg (145 lb 6 oz)   03/06/20 63.9 kg (140 lb 12.8 oz)   07/10/19 67.3 kg (148 lb 6.4 oz)   02/14/19 65.7 kg (144 lb 12.8 oz)     Vital Signs:  BP (!) 156/91 (BP Location: Left arm, Patient Position: Sitting, Cuff Size: Adult Regular)   Pulse (!) 124   Wt 63.1 kg (139 lb 3.2 oz)   SpO2 97%   BMI 25.29 kg/m      General appearance: she is well-developed, well-nourished, and in no distress.  Eyes: conjunctivae and extraocular motions are normal. Pupils are equal, round, and reactive to light. No lid lag, no stare.  HENT: oropharynx clear and moist; neck no JVD, no bruits, no thyromegaly, no palpable nodules  Cardiovascular: regular rhythm, heart rate in the 90s, no murmurs, distal pulses palpable, no edema  Respiratory: chest clear, no rales, no rhonchi  Musculoskeletal: normal tone and strength   Neurologic: reflexes normal and symmetric, no resting tremor  Psychiatric: affect and judgment normal   Feet: No lesions    Lab Results  I reviewed prior lab results documented in Epic.  Lab Results    Component Value Date    A1C 7.0 (H) 10/19/2021    A1C 7.4 (H) 11/10/2020    A1C 7.4 (A) 07/10/2019    A1C 7.4 07/24/2018    A1C 7.8 01/02/2018    A1C 8.1 07/26/2016       Hemoglobin   Date Value Ref Range Status   09/22/2008 16.0 (H) 11.7 - 15.7 g/dL Final     Hematocrit   Date Value Ref Range Status   10/19/2021 39.6 35.0 - 47.0 % Final   11/10/2020 43.7 35.0 - 47.0 % Final     Cholesterol   Date Value Ref Range Status   10/19/2021 173 <200 mg/dL Final   11/10/2020 152 <200 mg/dL Final     Cholesterol/HDL Ratio   Date Value Ref Range Status   01/03/2013 5.0 0.0 - 5.0 Final     HDL Cholesterol   Date Value Ref Range Status   11/10/2020 46 (L) >49 mg/dL Final     Direct Measure HDL   Date Value Ref Range Status   10/19/2021 44 (L) >=50 mg/dL Final     LDL Cholesterol Calculated   Date Value Ref Range Status   10/19/2021 101 (H) <=100 mg/dL Final   11/10/2020 71 <100 mg/dL Final     Comment:     Desirable:       <100 mg/dl     VLDL-Cholesterol   Date Value Ref Range Status   01/03/2013 31 (H) 0 - 30 mg/dL Final     Triglycerides   Date Value Ref Range Status   10/19/2021 140 <150 mg/dL Final   11/10/2020 177 (H) <150 mg/dL Final     Comment:     Borderline high:  150-199 mg/dl  High:             200-499 mg/dl  Very high:       >499 mg/dl  Fasting specimen       Albumin Urine mg/L   Date Value Ref Range Status   10/19/2021 67 mg/L Final   11/10/2020 104 mg/L Final     TSH   Date Value Ref Range Status   10/19/2021 2.32 0.40 - 4.00 mU/L Final   11/10/2020 3.27 0.40 - 4.00 mU/L Final         Last Basic Metabolic Panel:    Sodium   Date Value Ref Range Status   10/19/2021 138 133 - 144 mmol/L Final   11/10/2020 137 133 - 144 mmol/L Final     Potassium   Date Value Ref Range Status   10/19/2021 4.3 3.4 - 5.3 mmol/L Final   11/10/2020 4.3 3.4 - 5.3 mmol/L Final     Chloride   Date Value Ref Range Status   10/19/2021 110 (H) 94 - 109 mmol/L Final   11/10/2020 108 94 - 109 mmol/L Final     Calcium   Date Value Ref Range  Status   10/19/2021 8.6 8.5 - 10.1 mg/dL Final   11/10/2020 9.0 8.5 - 10.1 mg/dL Final     Carbon Dioxide   Date Value Ref Range Status   11/10/2020 21 20 - 32 mmol/L Final     Carbon Dioxide (CO2)   Date Value Ref Range Status   10/19/2021 25 20 - 32 mmol/L Final     Urea Nitrogen   Date Value Ref Range Status   10/19/2021 11 7 - 30 mg/dL Final   11/10/2020 10 7 - 30 mg/dL Final     Creatinine   Date Value Ref Range Status   10/19/2021 0.83 0.52 - 1.04 mg/dL Final   11/10/2020 0.59 0.52 - 1.04 mg/dL Final     GFR Estimate   Date Value Ref Range Status   10/19/2021 >90 >60 mL/min/1.73m2 Final     Comment:     As of July 11, 2021, eGFR is calculated by the CKD-EPI creatinine equation, without race adjustment. eGFR can be influenced by muscle mass, exercise, and diet. The reported eGFR is an estimation only and is only applicable if the renal function is stable.   11/10/2020 >90 >60 mL/min/[1.73_m2] Final     Comment:     Non  GFR Calc  Starting 12/18/2018, serum creatinine based estimated GFR (eGFR) will be   calculated using the Chronic Kidney Disease Epidemiology Collaboration   (CKD-EPI) equation.       Glucose   Date Value Ref Range Status   10/19/2021 156 (H) 70 - 99 mg/dL Final   11/10/2020 182 (H) 70 - 99 mg/dL Final     Comment:     Fasting specimen       AST   Date Value Ref Range Status   10/19/2021 17 0 - 45 U/L Final   11/10/2020 27 0 - 45 U/L Final     ALT   Date Value Ref Range Status   10/19/2021 29 0 - 50 U/L Final   11/10/2020 53 (H) 0 - 50 U/L Final     Albumin   Date Value Ref Range Status   10/19/2021 3.8 3.4 - 5.0 g/dL Final   11/10/2020 3.9 3.4 - 5.0 g/dL Final     TSH   Date Value Ref Range Status   10/19/2021 2.32 0.40 - 4.00 mU/L Final   11/10/2020 3.27 0.40 - 4.00 mU/L Final       Assessment     1. Type 1 diabetes with overall good glycemic control, complicated by mild diabetic retinopathy and microalbuminuria.  The patient is considering getting  pregnant.  Recommendations:  Change the insulin to carbohydrate ratio to 1 unit per 6 g carbohydrates.  This ratio can be further adjusted based on her postmeal blood sugar.  Always bolus prior to food intake.  If she is not sure she is going to complete the meal, she should bolus twice, for each half of the meal.  Avoid entering fake carbs as this can result in hypoglycemic episodes  Have the pump downloaded for my review as needed  Follow-up urine microalbumin at her next visit  The patient was counseled on:   - the importance of obtaining a tight glycemic control during pregnancy in order to decrease the risk of potential fetal/pregnancy complications like: congenital malformations, spontaneous , macrosomia, preeclampsia,  birth, etc;   - blood glucose goals in diabetic pregnant woman: premeal BG < 95, peak 1 hour postprandial below 140 and 2-hour postprandial below 120, HbA1c <6%/6.5%.    2.  Mild goiter on clinical exam  Clinically and biochemically, the patient is euthyroid.     3.  Hypertension, uncontrolled  Advised to increase the dose of metoprolol to 75 mg daily and continue to take lisinopril at 10 mg twice daily.  She is going to have the blood pressure and the pulse checked at work and contact us with the numbers.  Once she decides to get pregnant, the antihypertensive medications have to be changed.    Orders Placed This Encounter   Procedures     Albumin Random Urine Quantitative with Creat Ratio     Hemoglobin A1c     42 minutes spent on the date of the encounter doing chart review, history and exam, documentation and further activities as noted above.    Answers for HPI/ROS submitted by the patient on 12/10/2021  General Symptoms: No  Skin Symptoms: No  HENT Symptoms: No  EYE SYMPTOMS: No  HEART SYMPTOMS: No  LUNG SYMPTOMS: No  INTESTINAL SYMPTOMS: No  URINARY SYMPTOMS: No  GYNECOLOGIC SYMPTOMS: Yes  BREAST SYMPTOMS: No  SKELETAL SYMPTOMS: No  BLOOD SYMPTOMS: No  NERVOUS SYSTEM  SYMPTOMS: No  MENTAL HEALTH SYMPTOMS: No  Bleeding or spotting between periods: No  Heavy or painful periods: No  Irregular periods: No  Vaginal discharge: Yes  Hot flashes: No  Vaginal dryness: No  Genital ulcers: No  Reduced libido: No  Painful intercourse: Yes  Difficulty with sexual arousal: No  Post-menopausal bleeding: No          Again, thank you for allowing me to participate in the care of your patient.        Sincerely,        Eda De Dios MD

## 2021-12-15 NOTE — NURSING NOTE
Feliciano Sahu's goals for this visit include:   Chief Complaint   Patient presents with     Diabetes     She requests these members of her care team be copied on today's visit information: No    PCP: No Ref-Primary, Physician    Referring Provider:  Referred Self, MD  No address on file    BP (!) 156/91 (BP Location: Left arm, Patient Position: Sitting, Cuff Size: Adult Regular)   Pulse (!) 124   Wt 63.1 kg (139 lb 3.2 oz)   SpO2 97%   BMI 25.29 kg/m      Do you need any medication refills at today's visit? Yes

## 2021-12-15 NOTE — PROGRESS NOTES
The patient is seen in f/up for evaluation of diabetes. She was last seen in the clinic in 7/2020.    Feliciano Sahu is a 26 year old female diagnosed with type 1 diabetes in 2008, when she was found to be in DKA. Hemoglobin A1c used to be between 9.3 and 10.9 and it significantly decreased since 2016, to 8-8.5%. Most recent A1c was 7.4%, in November 2020.    Today, it was 7%. Her diabetes is known to be complicated by microalbuminuria.    Insulin pump (Tandem 9/2020) settings:  Basal rate at 12 midnight 1.5 units per hour  Insulin to carb ratio 1 U per 7 grams  Sensitivity 30   Target 120    Work profile (not using it):   Basal rate at 12 midnight 1.4 units per hour  Insulin to carb ratio 1 U per 7 grams  Sensitivity 30   Target 140    I reviewed the insulin pump and sensor records.  On the sensor, average glucose is 148, with a standard deviation of 30, corresponding to a GMI of 6.8%.  81% of the glucose numbers are within target of .  The sensor reveals a tendency towards mild postprandial hyperglycemia, mainly following lunch.  There are no significant hypoglycemic episodes documented by the sensor.  On the pump, total daily insulin dose is 58 units, of which 64% is basal insulin, 5% is correction bolus and 4% is control IQ bolus.  She has been using the pump in the control IQ mode 96% of time.  For most of the meals, there are 2-4 boluses which occurr over a period of 1 to 2 hours, on average.  As per patient, some of them are fake carbs, in order to bring her blood sugar down.  Recently, she has been trying to bolus twice for food intake as she is not sure if she is going to end up completing her meal.    She is considering pursuing pregnancy.  She wants to discuss about diabetes control during pregnancy and possible complications.    Diabetes complications:  Retinopathy: last eye exam 11/2020; mild DR. Eye exam scheduled tomorrow.   Nephropathy: h/o proteinuria dating back to 2010 - gradually improving    Neuropathy: no numbness or tingling sensation   She has been rarely experiencing lows, less then once a month.   Feels shaky, sweaty and dizzy when BG drops in the 80s. Lowest BG she remembers was 35. She denies prior episodes of loss of consciousness due to hypoglycemia.  She does have glucagon at home.  She had one episode of diabetes ketoacidosis in 1999.    Hypertension, currently treated with 10 mg lisinopril daily and half a tablet of 25 mg metoprolol succinate daily.  She has not been checking her blood pressure.  She attributes the elevated blood pressure from today to the stress of being in time for the clinic visit.    Past Medical History:   Diagnosis Date     Diabetes mellitus (H) 1998    Type 1     Diabetic eye exam (H) 08/09/2013    [facility]   Treated with Depo-Provera since, approximately, 2014    Past Surgical History:   Procedure Laterality Date     EYE SURGERY      lazy eye correction (strabismus?)     Current Medications       Current Outpatient Medications:      blood glucose monitoring (RACHEL CONTOUR NEXT) test strip, Use to test blood sugar 4 times daily or as directed., Disp: 100 strip, Rfl: 0     buPROPion (WELLBUTRIN XL) 150 MG 24 hr tablet, Take 1 tablet (150 mg) by mouth every morning, Disp: 90 tablet, Rfl: 3     cetirizine (ZYRTEC) 10 MG tablet, Take 1 tablet (10 mg) by mouth daily as needed for allergies, Disp: 90 tablet, Rfl: 1     Continuous Blood Gluc Sensor (DEXCOM G6 SENSOR) MISC, Change every 10 days., Disp: 9 each, Rfl: 3     Continuous Blood Gluc Transmit (DEXCOM G6 TRANSMITTER) MISC, Change every 3 months., Disp: 1 each, Rfl: 3     glucagon (GLUCAGON EMERGENCY) 1 MG kit, Inject 1 mg subcutaneously or intramuscularly., Disp: 1 each, Rfl: 3     insulin lispro (HUMALOG) 100 UNIT/ML vial, USES UP TO 75 UNITS PER DAY IN INSULIN PUMP FOR BASAL,BOLUS AND PRIMING INSULIN PUMP TUBING, Disp: 70 mL, Rfl: 1     levonorgest-eth estrad 91-Day (SEASONIQUE) 0.15-0.03 &0.01 MG  "tablet, Take 1 tablet by mouth daily, Disp: 91 tablet, Rfl: 7     lisinopril (ZESTRIL) 10 MG tablet, Take 1 tablet (10 mg) by mouth 2 times daily, Disp: 180 tablet, Rfl: 3     metoprolol succinate ER (TOPROL-XL) 25 MG 24 hr tablet, Take 1/2 (one-half) tablet by mouth once daily, Disp: 45 tablet, Rfl: 1     fluconazole (DIFLUCAN) 150 MG tablet, Take 1 tablet (150 mg) by mouth daily (Patient not taking: Reported on 9/10/2021), Disp: 3 tablet, Rfl: 0     insulin syringe 31G X 5/16\" 0.5 ML MISC, Use 4-6 times daily in the event of pump failure (Patient not taking: Reported on 9/10/2021), Disp: 50 each, Rfl: 1    Current Facility-Administered Medications:      medroxyPROGESTERone (DEPO-PROVERA) injection 150 mg, 150 mg, Intramuscular, Q90 Days, Cecilia Banks, CLEMENCIA CNP, 150 mg at 11/25/20 1130    Family History   Mother has hypothyroidism. Father has hypercholesterolemia. Grandfather melanoma. No known family history of hypertension, death at a young age, stroke.     Social History  Single. No children. She denies smoking, drinking alcohol or using illicit drugs. Occupation: nurse at [employer].     Review of Systems   Systemic:             Weight down 9 lbs since May this year - partially intentional   Eye:                       No eye symptoms   Anmol-Laryngeal:      No anmol-laryngeal symptoms, no dysphagia, no voice hoarseness, no cough      Breast:                   No breast symptoms  Cardiovascular:     No cardiovascular symptoms, no CP or palpitations   Pulmonary:            No pulmonary symptoms, no cough or SOB    Gastrointestinal:    No gastrointestinal symptoms, no diarrhea or constipation   Genitourinary:        No genitourinary symptoms    Endocrine:             No endocrine symptoms, no heat or cold intolerance, no increased sweating; MP absent on the BCP   Neurological:         rare headaches, no tremor, no dizziness     Musculoskeletal:   No joint pain or muscle cramps   Skin: "                      No skin symptoms   Psychological:       Gets anxious easily           Vital Signs     Previous Weights:    Wt Readings from Last 10 Encounters:   12/15/21 63.1 kg (139 lb 3.2 oz)   05/04/21 67.1 kg (148 lb)   03/25/21 69.1 kg (152 lb 4 oz)   01/06/21 68.9 kg (152 lb)   11/25/20 68.3 kg (150 lb 8 oz)   08/28/20 67.6 kg (149 lb)   06/05/20 65.9 kg (145 lb 6 oz)   03/06/20 63.9 kg (140 lb 12.8 oz)   07/10/19 67.3 kg (148 lb 6.4 oz)   02/14/19 65.7 kg (144 lb 12.8 oz)     Vital Signs:  BP (!) 156/91 (BP Location: Left arm, Patient Position: Sitting, Cuff Size: Adult Regular)   Pulse (!) 124   Wt 63.1 kg (139 lb 3.2 oz)   SpO2 97%   BMI 25.29 kg/m      General appearance: she is well-developed, well-nourished, and in no distress.  Eyes: conjunctivae and extraocular motions are normal. Pupils are equal, round, and reactive to light. No lid lag, no stare.  HENT: oropharynx clear and moist; neck no JVD, no bruits, no thyromegaly, no palpable nodules  Cardiovascular: regular rhythm, heart rate in the 90s, no murmurs, distal pulses palpable, no edema  Respiratory: chest clear, no rales, no rhonchi  Musculoskeletal: normal tone and strength   Neurologic: reflexes normal and symmetric, no resting tremor  Psychiatric: affect and judgment normal   Feet: No lesions    Lab Results  I reviewed prior lab results documented in Epic.  Lab Results   Component Value Date    A1C 7.0 (H) 10/19/2021    A1C 7.4 (H) 11/10/2020    A1C 7.4 (A) 07/10/2019    A1C 7.4 07/24/2018    A1C 7.8 01/02/2018    A1C 8.1 07/26/2016       Hemoglobin   Date Value Ref Range Status   09/22/2008 16.0 (H) 11.7 - 15.7 g/dL Final     Hematocrit   Date Value Ref Range Status   10/19/2021 39.6 35.0 - 47.0 % Final   11/10/2020 43.7 35.0 - 47.0 % Final     Cholesterol   Date Value Ref Range Status   10/19/2021 173 <200 mg/dL Final   11/10/2020 152 <200 mg/dL Final     Cholesterol/HDL Ratio   Date Value Ref Range Status   01/03/2013 5.0 0.0 -  5.0 Final     HDL Cholesterol   Date Value Ref Range Status   11/10/2020 46 (L) >49 mg/dL Final     Direct Measure HDL   Date Value Ref Range Status   10/19/2021 44 (L) >=50 mg/dL Final     LDL Cholesterol Calculated   Date Value Ref Range Status   10/19/2021 101 (H) <=100 mg/dL Final   11/10/2020 71 <100 mg/dL Final     Comment:     Desirable:       <100 mg/dl     VLDL-Cholesterol   Date Value Ref Range Status   01/03/2013 31 (H) 0 - 30 mg/dL Final     Triglycerides   Date Value Ref Range Status   10/19/2021 140 <150 mg/dL Final   11/10/2020 177 (H) <150 mg/dL Final     Comment:     Borderline high:  150-199 mg/dl  High:             200-499 mg/dl  Very high:       >499 mg/dl  Fasting specimen       Albumin Urine mg/L   Date Value Ref Range Status   10/19/2021 67 mg/L Final   11/10/2020 104 mg/L Final     TSH   Date Value Ref Range Status   10/19/2021 2.32 0.40 - 4.00 mU/L Final   11/10/2020 3.27 0.40 - 4.00 mU/L Final         Last Basic Metabolic Panel:    Sodium   Date Value Ref Range Status   10/19/2021 138 133 - 144 mmol/L Final   11/10/2020 137 133 - 144 mmol/L Final     Potassium   Date Value Ref Range Status   10/19/2021 4.3 3.4 - 5.3 mmol/L Final   11/10/2020 4.3 3.4 - 5.3 mmol/L Final     Chloride   Date Value Ref Range Status   10/19/2021 110 (H) 94 - 109 mmol/L Final   11/10/2020 108 94 - 109 mmol/L Final     Calcium   Date Value Ref Range Status   10/19/2021 8.6 8.5 - 10.1 mg/dL Final   11/10/2020 9.0 8.5 - 10.1 mg/dL Final     Carbon Dioxide   Date Value Ref Range Status   11/10/2020 21 20 - 32 mmol/L Final     Carbon Dioxide (CO2)   Date Value Ref Range Status   10/19/2021 25 20 - 32 mmol/L Final     Urea Nitrogen   Date Value Ref Range Status   10/19/2021 11 7 - 30 mg/dL Final   11/10/2020 10 7 - 30 mg/dL Final     Creatinine   Date Value Ref Range Status   10/19/2021 0.83 0.52 - 1.04 mg/dL Final   11/10/2020 0.59 0.52 - 1.04 mg/dL Final     GFR Estimate   Date Value Ref Range Status   10/19/2021 >90  >60 mL/min/1.73m2 Final     Comment:     As of July 11, 2021, eGFR is calculated by the CKD-EPI creatinine equation, without race adjustment. eGFR can be influenced by muscle mass, exercise, and diet. The reported eGFR is an estimation only and is only applicable if the renal function is stable.   11/10/2020 >90 >60 mL/min/[1.73_m2] Final     Comment:     Non  GFR Calc  Starting 12/18/2018, serum creatinine based estimated GFR (eGFR) will be   calculated using the Chronic Kidney Disease Epidemiology Collaboration   (CKD-EPI) equation.       Glucose   Date Value Ref Range Status   10/19/2021 156 (H) 70 - 99 mg/dL Final   11/10/2020 182 (H) 70 - 99 mg/dL Final     Comment:     Fasting specimen       AST   Date Value Ref Range Status   10/19/2021 17 0 - 45 U/L Final   11/10/2020 27 0 - 45 U/L Final     ALT   Date Value Ref Range Status   10/19/2021 29 0 - 50 U/L Final   11/10/2020 53 (H) 0 - 50 U/L Final     Albumin   Date Value Ref Range Status   10/19/2021 3.8 3.4 - 5.0 g/dL Final   11/10/2020 3.9 3.4 - 5.0 g/dL Final     TSH   Date Value Ref Range Status   10/19/2021 2.32 0.40 - 4.00 mU/L Final   11/10/2020 3.27 0.40 - 4.00 mU/L Final       Assessment     1. Type 1 diabetes with overall good glycemic control, complicated by mild diabetic retinopathy and microalbuminuria.  The patient is considering getting pregnant.  Recommendations:  Change the insulin to carbohydrate ratio to 1 unit per 6 g carbohydrates.  This ratio can be further adjusted based on her postmeal blood sugar.  Always bolus prior to food intake.  If she is not sure she is going to complete the meal, she should bolus twice, for each half of the meal.  Avoid entering fake carbs as this can result in hypoglycemic episodes  Have the pump downloaded for my review as needed  Follow-up urine microalbumin at her next visit  The patient was counseled on:   - the importance of obtaining a tight glycemic control during pregnancy in order to  decrease the risk of potential fetal/pregnancy complications like: congenital malformations, spontaneous , macrosomia, preeclampsia,  birth, etc;   - blood glucose goals in diabetic pregnant woman: premeal BG < 95, peak 1 hour postprandial below 140 and 2-hour postprandial below 120, HbA1c <6%/6.5%.    2.  Mild goiter on clinical exam  Clinically and biochemically, the patient is euthyroid.     3.  Hypertension, uncontrolled  Advised to increase the dose of metoprolol to 75 mg daily and continue to take lisinopril at 10 mg twice daily.  She is going to have the blood pressure and the pulse checked at work and contact us with the numbers.  Once she decides to get pregnant, the antihypertensive medications have to be changed.    Orders Placed This Encounter   Procedures     Albumin Random Urine Quantitative with Creat Ratio     Hemoglobin A1c     42 minutes spent on the date of the encounter doing chart review, history and exam, documentation and further activities as noted above.    Answers for HPI/ROS submitted by the patient on 12/10/2021  General Symptoms: No  Skin Symptoms: No  HENT Symptoms: No  EYE SYMPTOMS: No  HEART SYMPTOMS: No  LUNG SYMPTOMS: No  INTESTINAL SYMPTOMS: No  URINARY SYMPTOMS: No  GYNECOLOGIC SYMPTOMS: Yes  BREAST SYMPTOMS: No  SKELETAL SYMPTOMS: No  BLOOD SYMPTOMS: No  NERVOUS SYSTEM SYMPTOMS: No  MENTAL HEALTH SYMPTOMS: No  Bleeding or spotting between periods: No  Heavy or painful periods: No  Irregular periods: No  Vaginal discharge: Yes  Hot flashes: No  Vaginal dryness: No  Genital ulcers: No  Reduced libido: No  Painful intercourse: Yes  Difficulty with sexual arousal: No  Post-menopausal bleeding: No

## 2021-12-17 ENCOUNTER — LAB (OUTPATIENT)
Dept: LAB | Facility: CLINIC | Age: 26
End: 2021-12-17
Payer: COMMERCIAL

## 2021-12-17 DIAGNOSIS — N76.0 BV (BACTERIAL VAGINOSIS): Primary | ICD-10-CM

## 2021-12-17 DIAGNOSIS — B96.89 BV (BACTERIAL VAGINOSIS): Primary | ICD-10-CM

## 2021-12-17 DIAGNOSIS — N89.8 VAGINAL DISCHARGE: ICD-10-CM

## 2021-12-17 LAB
CLUE CELLS: PRESENT
TRICHOMONAS, WET PREP: ABNORMAL
WBC'S/HIGH POWER FIELD, WET PREP: ABNORMAL
YEAST, WET PREP: ABNORMAL

## 2021-12-17 PROCEDURE — 87210 SMEAR WET MOUNT SALINE/INK: CPT

## 2021-12-17 RX ORDER — METRONIDAZOLE 500 MG/1
500 TABLET ORAL 2 TIMES DAILY
Qty: 14 TABLET | Refills: 0 | Status: SHIPPED | OUTPATIENT
Start: 2021-12-17 | End: 2021-12-24

## 2021-12-29 DIAGNOSIS — I10 BENIGN ESSENTIAL HYPERTENSION: ICD-10-CM

## 2021-12-30 RX ORDER — LISINOPRIL 10 MG/1
TABLET ORAL
Qty: 90 TABLET | Refills: 0 | OUTPATIENT
Start: 2021-12-30

## 2022-01-11 ENCOUNTER — LAB REQUISITION (OUTPATIENT)
Dept: LAB | Facility: CLINIC | Age: 27
End: 2022-01-11
Payer: COMMERCIAL

## 2022-01-11 DIAGNOSIS — Z11.1 ENCOUNTER FOR SCREENING FOR RESPIRATORY TUBERCULOSIS: ICD-10-CM

## 2022-01-11 PROCEDURE — 86481 TB AG RESPONSE T-CELL SUSP: CPT | Mod: ORL

## 2022-01-11 PROCEDURE — 36415 COLL VENOUS BLD VENIPUNCTURE: CPT | Mod: ORL

## 2022-01-13 LAB
GAMMA INTERFERON BACKGROUND BLD IA-ACNC: 0.01 IU/ML
M TB IFN-G BLD-IMP: NEGATIVE
M TB IFN-G CD4+ BCKGRND COR BLD-ACNC: 9.99 IU/ML
MITOGEN IGNF BCKGRD COR BLD-ACNC: 0 IU/ML
MITOGEN IGNF BCKGRD COR BLD-ACNC: 0 IU/ML
QUANTIFERON MITOGEN: 10 IU/ML
QUANTIFERON NIL TUBE: 0.01 IU/ML
QUANTIFERON TB1 TUBE: 0.01 IU/ML
QUANTIFERON TB2 TUBE: 0.01

## 2022-02-01 NOTE — TELEPHONE ENCOUNTER
CMN for Dexcom G6 completed with chart notes and faxed to [facility] at [phone].    Brandy Rodriguez LPN  Diabetes Clinic Coordinator   Adult Endocrinology and Pediatric [facility]ty Clinics  [facility]           Daughter left a message on the refill hotline stating they want an order to have a UA checked since she is "hallucinating like crazy" and she has frequent UTI's  She would like an order to be placed in her chart so she can take her urine sample to [facility]   She states that it has been 2 weeks since her last UTI [email or web address]  She left this message on the hotline

## 2022-02-02 ASSESSMENT — PATIENT HEALTH QUESTIONNAIRE - PHQ9
10. IF YOU CHECKED OFF ANY PROBLEMS, HOW DIFFICULT HAVE THESE PROBLEMS MADE IT FOR YOU TO DO YOUR WORK, TAKE CARE OF THINGS AT HOME, OR GET ALONG WITH OTHER PEOPLE: SOMEWHAT DIFFICULT
SUM OF ALL RESPONSES TO PHQ QUESTIONS 1-9: 5
SUM OF ALL RESPONSES TO PHQ QUESTIONS 1-9: 5

## 2022-02-03 ENCOUNTER — OFFICE VISIT (OUTPATIENT)
Dept: FAMILY MEDICINE | Facility: CLINIC | Age: 27
End: 2022-02-03
Payer: COMMERCIAL

## 2022-02-03 VITALS
RESPIRATION RATE: 14 BRPM | DIASTOLIC BLOOD PRESSURE: 70 MMHG | HEART RATE: 94 BPM | BODY MASS INDEX: 26.07 KG/M2 | OXYGEN SATURATION: 99 % | WEIGHT: 143.5 LBS | SYSTOLIC BLOOD PRESSURE: 116 MMHG | TEMPERATURE: 98.2 F

## 2022-02-03 DIAGNOSIS — N89.8 VAGINAL DISCHARGE: Primary | ICD-10-CM

## 2022-02-03 LAB
CLUE CELLS: ABNORMAL
TRICHOMONAS, WET PREP: ABNORMAL
WBC'S/HIGH POWER FIELD, WET PREP: ABNORMAL
YEAST, WET PREP: ABNORMAL

## 2022-02-03 PROCEDURE — 99213 OFFICE O/P EST LOW 20 MIN: CPT | Performed by: STUDENT IN AN ORGANIZED HEALTH CARE EDUCATION/TRAINING PROGRAM

## 2022-02-03 PROCEDURE — 87210 SMEAR WET MOUNT SALINE/INK: CPT | Performed by: STUDENT IN AN ORGANIZED HEALTH CARE EDUCATION/TRAINING PROGRAM

## 2022-02-03 ASSESSMENT — PATIENT HEALTH QUESTIONNAIRE - PHQ9: SUM OF ALL RESPONSES TO PHQ QUESTIONS 1-9: 5

## 2022-02-03 NOTE — PROGRESS NOTES
"  Assessment & Plan     Vaginal discharge  We will repeat wet prep today.  Would opt for clindamycin treatment given persistent symptoms on metronidazole although actual resistance is rare.   - Wet prep - Clinic Collect       BMI:   Estimated body mass index is 26.07 kg/m  as calculated from the following:    Height as of 5/4/21: 1.58 m (5' 2.21\").    Weight as of this encounter: 65.1 kg (143 lb 8 oz).       Return if symptoms worsen or fail to improve.    Paulino Kinney MD  [facility]    Cassi Wiggins is a 26 year old who presents for the following health issues     HPI     BV symptoms have not improved. No new symptoms.  Continues to have trouble with odor and vaginal discharge.  Tells me she took all her medication and completed the course.  No urinary symptoms or abdominal pain.  She has been having normal periods but gets them every 3 months with her oral birth control.  She is also type I diabetic with pretty good control.  No other previous trouble with STDs or vaginal infections.  Has dealt with recurrent BV in the past but last episode was a year prior to this most recent bout.  No other acute concerns.        Review of Systems   Constitutional, HEENT, cardiovascular, pulmonary, gi and gu systems are negative, except as otherwise noted.      Objective    /70 (BP Location: Right arm)   Pulse 94   Temp 98.2  F (36.8  C) (Temporal)   Resp 14   Wt 65.1 kg (143 lb 8 oz)   SpO2 99%   BMI 26.07 kg/m    Body mass index is 26.07 kg/m .  Physical Exam   GENERAL: healthy, alert and no distress  EYES: Eyes grossly normal to inspection, PERRL and conjunctivae and sclerae normal  HENT: Hearing grossly intact  RESP: Breathing comfortably room air  CV: No lower extremity edema  ABDOMEN: soft, no suprapubic tenderness to palpation, no CVA tenderness  MS: no gross musculoskeletal defects noted, no edema  SKIN: no suspicious lesions or rashes  NEURO:  mentation intact and speech " normal  PSYCH: mentation appears normal, affect normal/bright        Answers for HPI/ROS submitted by the patient on 2/2/2022  If you checked off any problems, how difficult have these problems made it for you to do your work, take care of things at home, or get along with other people?: Somewhat difficult  PHQ9 TOTAL SCORE: 5

## 2022-02-04 ENCOUNTER — MYC MEDICAL ADVICE (OUTPATIENT)
Dept: FAMILY MEDICINE | Facility: CLINIC | Age: 27
End: 2022-02-04
Payer: COMMERCIAL

## 2022-02-04 DIAGNOSIS — N89.8 VAGINAL DISCHARGE: Primary | ICD-10-CM

## 2022-02-04 NOTE — TELEPHONE ENCOUNTER
Chart reviewed. No antibiotic recommended per Dr. Kinney and Nick message reflecting same sent to patient by Dr. Kinney.  Yo Wiggins MD

## 2022-02-12 ENCOUNTER — APPOINTMENT (OUTPATIENT)
Dept: LAB | Facility: CLINIC | Age: 27
End: 2022-02-12
Payer: COMMERCIAL

## 2022-02-15 ENCOUNTER — LAB (OUTPATIENT)
Dept: LAB | Facility: CLINIC | Age: 27
End: 2022-02-15
Payer: COMMERCIAL

## 2022-02-15 DIAGNOSIS — N89.8 VAGINAL DISCHARGE: ICD-10-CM

## 2022-02-15 PROCEDURE — 87591 N.GONORRHOEAE DNA AMP PROB: CPT | Performed by: STUDENT IN AN ORGANIZED HEALTH CARE EDUCATION/TRAINING PROGRAM

## 2022-02-15 PROCEDURE — 87491 CHLMYD TRACH DNA AMP PROBE: CPT | Performed by: STUDENT IN AN ORGANIZED HEALTH CARE EDUCATION/TRAINING PROGRAM

## 2022-02-15 PROCEDURE — 87210 SMEAR WET MOUNT SALINE/INK: CPT

## 2022-02-16 LAB
C TRACH DNA SPEC QL PROBE+SIG AMP: NEGATIVE
N GONORRHOEA DNA SPEC QL NAA+PROBE: NEGATIVE

## 2022-02-17 ENCOUNTER — LAB (OUTPATIENT)
Dept: LAB | Facility: CLINIC | Age: 27
End: 2022-02-17
Payer: COMMERCIAL

## 2022-02-17 DIAGNOSIS — R80.9 TYPE 1 DIABETES MELLITUS WITH MICROALBUMINURIA (H): ICD-10-CM

## 2022-02-17 DIAGNOSIS — E10.29 TYPE 1 DIABETES MELLITUS WITH MICROALBUMINURIA (H): ICD-10-CM

## 2022-02-17 LAB
CREAT UR-MCNC: 53 MG/DL
MICROALBUMIN UR-MCNC: 19 MG/L
MICROALBUMIN/CREAT UR: 35.85 MG/G CR (ref 0–25)

## 2022-02-17 PROCEDURE — 82043 UR ALBUMIN QUANTITATIVE: CPT

## 2022-04-23 ENCOUNTER — HEALTH MAINTENANCE LETTER (OUTPATIENT)
Age: 27
End: 2022-04-23

## 2022-06-06 ENCOUNTER — E-VISIT (OUTPATIENT)
Dept: URGENT CARE | Facility: CLINIC | Age: 27
End: 2022-06-06
Payer: COMMERCIAL

## 2022-06-06 DIAGNOSIS — J01.90 ACUTE BACTERIAL SINUSITIS: Primary | ICD-10-CM

## 2022-06-06 DIAGNOSIS — B96.89 ACUTE BACTERIAL SINUSITIS: Primary | ICD-10-CM

## 2022-06-06 PROCEDURE — 99421 OL DIG E/M SVC 5-10 MIN: CPT | Performed by: FAMILY MEDICINE

## 2022-06-06 NOTE — PATIENT INSTRUCTIONS
Dear Feliciano Sahu    After reviewing your responses, I've been able to diagnose you with?a sinus infection caused by bacteria.?     Based on your responses and diagnosis, I have prescribed Augmentin to treat your symptoms. I have sent this to your pharmacy.?     It is also important to stay well hydrated, get lots of rest and take over-the-counter decongestants,?tylenol?or ibuprofen if you?are able to?take those medications per your primary care provider to help relieve discomfort.?     It is important that you take?all of?your prescribed medication even if your symptoms are improving after a few doses.? Taking?all of?your medicine helps prevent the symptoms from returning.?     If your symptoms worsen, you develop severe headache, vomiting, high fever (>102), or are not improving in 7 days, please contact your primary care provider for an appointment or visit any of our convenient Walk-in Care or Urgent Care Centers to be seen which can be found on our website?here.?     Thanks again for choosing?us?as your health care partner,?   ?  Kelley Gibbs MD?

## 2022-06-18 ENCOUNTER — HEALTH MAINTENANCE LETTER (OUTPATIENT)
Age: 27
End: 2022-06-18

## 2022-06-29 DIAGNOSIS — R80.9 TYPE 1 DIABETES MELLITUS WITH MICROALBUMINURIA (H): ICD-10-CM

## 2022-06-29 DIAGNOSIS — E10.29 TYPE 1 DIABETES MELLITUS WITH MICROALBUMINURIA (H): ICD-10-CM

## 2022-07-01 ENCOUNTER — MYC MEDICAL ADVICE (OUTPATIENT)
Dept: ENDOCRINOLOGY | Facility: CLINIC | Age: 27
End: 2022-07-01

## 2022-07-01 DIAGNOSIS — R80.9 TYPE 1 DIABETES MELLITUS WITH MICROALBUMINURIA (H): ICD-10-CM

## 2022-07-01 DIAGNOSIS — E10.29 TYPE 1 DIABETES MELLITUS WITH MICROALBUMINURIA (H): ICD-10-CM

## 2022-07-01 RX ORDER — PROCHLORPERAZINE 25 MG/1
SUPPOSITORY RECTAL
Qty: 1 EACH | Refills: 3 | Status: SHIPPED | OUTPATIENT
Start: 2022-07-01 | End: 2023-07-11

## 2022-07-01 RX ORDER — PROCHLORPERAZINE 25 MG/1
SUPPOSITORY RECTAL
Qty: 1 EACH | Refills: 3 | OUTPATIENT
Start: 2022-07-01

## 2022-07-01 RX ORDER — PROCHLORPERAZINE 25 MG/1
SUPPOSITORY RECTAL
Qty: 9 EACH | Refills: 3 | Status: SHIPPED | OUTPATIENT
Start: 2022-07-01 | End: 2023-07-11

## 2022-07-26 DIAGNOSIS — E10.9 TYPE 1 DIABETES MELLITUS NOT AT GOAL (H): ICD-10-CM

## 2022-07-27 RX ORDER — INSULIN LISPRO 100 [IU]/ML
INJECTION, SOLUTION INTRAVENOUS; SUBCUTANEOUS
Qty: 70 ML | Refills: 0 | Status: SHIPPED | OUTPATIENT
Start: 2022-07-27 | End: 2022-11-08

## 2022-07-27 NOTE — TELEPHONE ENCOUNTER
HumaLOG 100 UNIT/ML Subcutaneous Solution  Last Written Prescription Date:  12/9/2021  Last Fill Quantity: 70,   # refills: 1  Last Office Visit :  12/15/2021  Future Office visit:   9/28/2022    Routing refill request to provider for review/approval because:  Drug not on the [facility], [facility] or [facility] refill protocol or controlled substance      Eda Domingo RN  Central Triage Red Flags/Med Refills

## 2022-10-02 ASSESSMENT — ENCOUNTER SYMPTOMS
HEADACHES: 0
DYSURIA: 0
BREAST MASS: 0
DIARRHEA: 0
SHORTNESS OF BREATH: 0
NERVOUS/ANXIOUS: 0
HEARTBURN: 0
MYALGIAS: 0
EYE PAIN: 0
ABDOMINAL PAIN: 0
PALPITATIONS: 0
SORE THROAT: 0
PARESTHESIAS: 0
COUGH: 0
WEAKNESS: 0
HEMATOCHEZIA: 0
NAUSEA: 0
CHILLS: 0
ARTHRALGIAS: 0
FEVER: 0
HEMATURIA: 0
DIZZINESS: 0
FREQUENCY: 0
JOINT SWELLING: 0
CONSTIPATION: 0

## 2022-10-03 ENCOUNTER — OFFICE VISIT (OUTPATIENT)
Dept: FAMILY MEDICINE | Facility: CLINIC | Age: 27
End: 2022-10-03
Payer: COMMERCIAL

## 2022-10-03 VITALS
HEIGHT: 63 IN | WEIGHT: 149 LBS | BODY MASS INDEX: 26.4 KG/M2 | SYSTOLIC BLOOD PRESSURE: 118 MMHG | DIASTOLIC BLOOD PRESSURE: 64 MMHG | TEMPERATURE: 98.1 F | HEART RATE: 103 BPM | OXYGEN SATURATION: 98 %

## 2022-10-03 DIAGNOSIS — Z12.4 CERVICAL CANCER SCREENING: ICD-10-CM

## 2022-10-03 DIAGNOSIS — F41.9 ANXIETY: ICD-10-CM

## 2022-10-03 DIAGNOSIS — Z00.00 ROUTINE GENERAL MEDICAL EXAMINATION AT A HEALTH CARE FACILITY: Primary | ICD-10-CM

## 2022-10-03 PROCEDURE — 90686 IIV4 VACC NO PRSV 0.5 ML IM: CPT | Performed by: PHYSICIAN ASSISTANT

## 2022-10-03 PROCEDURE — G0145 SCR C/V CYTO,THINLAYER,RESCR: HCPCS | Performed by: PHYSICIAN ASSISTANT

## 2022-10-03 PROCEDURE — 99395 PREV VISIT EST AGE 18-39: CPT | Mod: 25 | Performed by: PHYSICIAN ASSISTANT

## 2022-10-03 PROCEDURE — 90471 IMMUNIZATION ADMIN: CPT | Performed by: PHYSICIAN ASSISTANT

## 2022-10-03 PROCEDURE — 99213 OFFICE O/P EST LOW 20 MIN: CPT | Mod: 25 | Performed by: PHYSICIAN ASSISTANT

## 2022-10-03 RX ORDER — BUPROPION HYDROCHLORIDE 150 MG/1
150 TABLET ORAL EVERY MORNING
Qty: 90 TABLET | Refills: 3 | Status: SHIPPED | OUTPATIENT
Start: 2022-10-03 | End: 2023-09-13

## 2022-10-03 ASSESSMENT — ENCOUNTER SYMPTOMS
PARESTHESIAS: 0
COUGH: 0
JOINT SWELLING: 0
HEARTBURN: 0
BREAST MASS: 0
HEADACHES: 0
NERVOUS/ANXIOUS: 0
HEMATURIA: 0
DYSURIA: 0
DIARRHEA: 0
DIZZINESS: 0
PALPITATIONS: 0
HEMATOCHEZIA: 0
SORE THROAT: 0
NAUSEA: 0
ABDOMINAL PAIN: 0
FREQUENCY: 0
FEVER: 0
SHORTNESS OF BREATH: 0
WEAKNESS: 0
EYE PAIN: 0
ARTHRALGIAS: 0
CHILLS: 0
MYALGIAS: 0
CONSTIPATION: 0

## 2022-10-03 ASSESSMENT — PAIN SCALES - GENERAL: PAINLEVEL: NO PAIN (0)

## 2022-10-03 NOTE — PROGRESS NOTES
SUBJECTIVE:   CC: Feliciano is an 27 year old who presents for preventive health visit.   Patient has been advised of split billing requirements and indicates understanding: Yes  Healthy Habits:     Getting at least 3 servings of Calcium per day:  Yes    Bi-annual eye exam:  Yes    Dental care twice a year:  Yes    Sleep apnea or symptoms of sleep apnea:  None    Diet:  Regular (no restrictions) and Diabetic    Frequency of exercise:  None    Taking medications regularly:  Yes    Medication side effects:  None    PHQ-2 Total Score: 1    Additional concerns today:  No    Was diagnosed with diabetes at age 3. Follows with endocrinology for this and has been doing really well. Has a follow-up visit in November for this.     Anxiety well controlled on current dose of Wellbutrin. Feels well balanced. Denies any side effects.     Works as a nurse at [employer].     Today's PHQ-2 Score:   PHQ-2 ( 1999 Pfizer) 10/2/2022   Q1: Little interest or pleasure in doing things 1   Q2: Feeling down, depressed or hopeless 0   PHQ-2 Score 1   PHQ-2 Total Score (12-17 Years)- Positive if 3 or more points; Administer PHQ-A if positive -   Q1: Little interest or pleasure in doing things Several days   Q2: Feeling down, depressed or hopeless Not at all   PHQ-2 Score 1       Abuse: Current or Past (Physical, Sexual or Emotional) - No  Do you feel safe in your environment? Yes        Social History     Tobacco Use     Smoking status: Never Smoker     Smokeless tobacco: Never Used     Tobacco comment: no smokers in household   Substance Use Topics     Alcohol use: No         Alcohol Use 10/2/2022   Prescreen: >3 drinks/day or >7 drinks/week? No   Prescreen: >3 drinks/day or >7 drinks/week? -       Reviewed orders with patient.  Reviewed health maintenance and updated orders accordingly - Yes  BP Readings from Last 3 Encounters:   10/03/22 118/64   02/03/22 116/70   12/15/21 (!) 156/91    Wt Readings from Last 3 Encounters:   10/03/22  67.6 kg (149 lb)   02/03/22 65.1 kg (143 lb 8 oz)   12/15/21 63.1 kg (139 lb 3.2 oz)                  Patient Active Problem List   Diagnosis     Eczema     Sinus tachycardia     Contraception     Uncontrolled type 1 diabetes mellitus with nephropathy     Heart murmur     Essential hypertension     Anxiety     Past Surgical History:   Procedure Laterality Date     EYE SURGERY      lazy eye correction (strabismus?)       Social History     Tobacco Use     Smoking status: Never Smoker     Smokeless tobacco: Never Used     Tobacco comment: no smokers in household   Substance Use Topics     Alcohol use: No     Family History   Problem Relation Age of Onset     Allergies Mother         seasonal     Thyroid Disease Mother         hypothyridism     Lipids Father      Cancer Paternal Grandfather         skin c/a     Asthma No family hx of      C.A.D. No family hx of      Diabetes No family hx of      Hypertension No family hx of      Cerebrovascular Disease No family hx of      Breast Cancer No family hx of      Cancer - colorectal No family hx of      Prostate Cancer No family hx of      Alcohol/Drug No family hx of      Alzheimer Disease No family hx of      Anesthesia Reaction No family hx of      Arthritis No family hx of      Blood Disease No family hx of      Cardiovascular No family hx of      Circulatory No family hx of      Congenital Anomalies No family hx of      Connective Tissue Disorder No family hx of      Depression No family hx of      Eye Disorder No family hx of      Genetic Disorder No family hx of      Gastrointestinal Disease No family hx of      Genitourinary Problems No family hx of      Gynecology No family hx of      Heart Disease No family hx of      Musculoskeletal Disorder No family hx of      Neurologic Disorder No family hx of      Obesity No family hx of      Osteoporosis No family hx of      Psychotic Disorder No family hx of      Respiratory No family hx of      Hearing Loss No family hx of  "         Current Outpatient Medications   Medication Sig Dispense Refill     blood glucose monitoring (RACHEL CONTOUR NEXT) test strip Use to test blood sugar 4 times daily or as directed. 100 strip 0     buPROPion (WELLBUTRIN XL) 150 MG 24 hr tablet Take 1 tablet (150 mg) by mouth every morning 90 tablet 3     cetirizine (ZYRTEC) 10 MG tablet Take 1 tablet (10 mg) by mouth daily as needed for allergies 90 tablet 1     Continuous Blood Gluc Sensor (DEXCOM G6 SENSOR) MISC Change every 10 days. 9 each 3     Continuous Blood Gluc Transmit (DEXCOM G6 TRANSMITTER) MISC Change every 3 months. 1 each 3     glucagon (GLUCAGON EMERGENCY) 1 MG kit Inject 1 mg subcutaneously or intramuscularly. 1 each 3     HUMALOG 100 UNIT/ML injection USE UP TO 75UNITS PER DAY IN INSULIN PUMP FOR BASAL,BOLUS,AND PRIMING INSULIN PUMP TUBING 70 mL 0     insulin syringe 31G X 5/16\" 0.5 ML MISC Use 4-6 times daily in the event of pump failure 50 each 1     levonorgest-eth estrad 91-Day (SEASONIQUE) 0.15-0.03 &0.01 MG tablet Take 1 tablet by mouth daily 91 tablet 7     lisinopril (ZESTRIL) 10 MG tablet Take 1 tablet (10 mg) by mouth 2 times daily 180 tablet 3     metoprolol succinate ER (TOPROL-XL) 25 MG 24 hr tablet Take 3 tablets (75 mg) by mouth daily 270 tablet 3       Breast Cancer Screening:    Breast CA Risk Assessment (FHS-7) 4/30/2021   Do you have a family history of breast, colon, or ovarian cancer? No / Unknown       Patient under 40 years of age: Routine Mammogram Screening not recommended.   Pertinent mammograms are reviewed under the imaging tab.    History of abnormal Pap smear: NO - age 30- 65 PAP every 3 years recommended  PAP / HPV 2/15/2019 10/14/2016   PAP (Historical) NIL NIL     Reviewed and updated as needed this visit by clinical staff   Tobacco  Allergies  Meds                Reviewed and updated as needed this visit by Provider                       Review of Systems   Constitutional: Negative for chills and fever. " "  HENT: Negative for congestion, ear pain, hearing loss and sore throat.    Eyes: Negative for pain and visual disturbance.   Respiratory: Negative for cough and shortness of breath.    Cardiovascular: Negative for chest pain, palpitations and peripheral edema.   Gastrointestinal: Negative for abdominal pain, constipation, diarrhea, heartburn, hematochezia and nausea.   Breasts:  Negative for tenderness, breast mass and discharge.   Genitourinary: Negative for dysuria, frequency, genital sores, hematuria, pelvic pain, urgency, vaginal bleeding and vaginal discharge.   Musculoskeletal: Negative for arthralgias, joint swelling and myalgias.   Skin: Negative for rash.   Neurological: Negative for dizziness, weakness, headaches and paresthesias.   Psychiatric/Behavioral: Negative for mood changes. The patient is not nervous/anxious.         OBJECTIVE:   /64   Pulse 103   Temp 98.1  F (36.7  C) (Temporal)   Ht 1.588 m (5' 2.5\")   Wt 67.6 kg (149 lb)   LMP 09/19/2022   SpO2 98%   BMI 26.82 kg/m    Physical Exam  GENERAL: healthy, alert and no distress  EYES: Eyes grossly normal to inspection, PERRL and conjunctivae and sclerae normal  HENT: ear canals and TM's normal, nose and mouth without ulcers or lesions  NECK: no adenopathy, no asymmetry, masses, or scars and thyroid normal to palpation  RESP: lungs clear to auscultation - no rales, rhonchi or wheezes  CV: regular rate and rhythm, normal S1 S2, no S3 or S4, no murmur, click or rub, no peripheral edema and peripheral pulses strong  ABDOMEN: soft, nontender, no hepatosplenomegaly, no masses and bowel sounds normal   (female): normal female external genitalia, normal urethral meatus, vaginal mucosa pink, moist, well rugated, and normal cervix/adnexa/uterus without masses or discharge  MS: no gross musculoskeletal defects noted, no edema  SKIN: no suspicious lesions or rashes  NEURO: Normal strength and tone, mentation intact and speech normal  PSYCH: " "mentation appears normal, affect normal/bright      ASSESSMENT/PLAN:   (Z00.00) Routine general medical examination at a health care facility  (primary encounter diagnosis)    (Z12.4) Cervical cancer screening  Plan: Pap Screen reflex to HPV if ASCUS - recommend         age 25 - 29    (F41.9) Anxiety  Comment: Well controlled on current dose of Wellbutrin.   Plan: buPROPion (WELLBUTRIN XL) 150 MG 24 hr tablet          Patient has been advised of split billing requirements and indicates understanding: Yes    COUNSELING:  Reviewed preventive health counseling, as reflected in patient instructions    Estimated body mass index is 26.82 kg/m  as calculated from the following:    Height as of this encounter: 1.588 m (5' 2.5\").    Weight as of this encounter: 67.6 kg (149 lb).    Weight management plan: Discussed healthy diet and exercise guidelines    She reports that she has never smoked. She has never used smokeless tobacco.      Counseling Resources:  ATP IV Guidelines  Pooled Cohorts Equation Calculator  Breast Cancer Risk Calculator  BRCA-Related Cancer Risk Assessment: FHS-7 Tool  FRAX Risk Assessment  ICSI Preventive Guidelines  Dietary Guidelines for Americans, 2010  USDA's MyPlate  ASA Prophylaxis  Lung CA Screening    [facility]  "

## 2022-10-05 LAB
BKR LAB AP GYN ADEQUACY: NORMAL
BKR LAB AP GYN INTERPRETATION: NORMAL
BKR LAB AP HPV REFLEX: NORMAL
BKR LAB AP PREVIOUS ABNORMAL: NORMAL
PATH REPORT.COMMENTS IMP SPEC: NORMAL
PATH REPORT.COMMENTS IMP SPEC: NORMAL
PATH REPORT.RELEVANT HX SPEC: NORMAL

## 2022-10-29 ENCOUNTER — HEALTH MAINTENANCE LETTER (OUTPATIENT)
Age: 27
End: 2022-10-29

## 2022-11-08 DIAGNOSIS — E10.9 TYPE 1 DIABETES MELLITUS NOT AT GOAL (H): ICD-10-CM

## 2022-11-08 RX ORDER — INSULIN LISPRO 100 [IU]/ML
INJECTION, SOLUTION INTRAVENOUS; SUBCUTANEOUS
Qty: 70 ML | Refills: 0 | Status: SHIPPED | OUTPATIENT
Start: 2022-11-08 | End: 2023-03-15

## 2022-11-08 NOTE — TELEPHONE ENCOUNTER
"Hello,    I did a test claim on patient's Humalog Kwikpen prescription and unfortunately it's not covered by the patient's insurance([facility]). The insurance prefers [employer]:        Novolog Flexpen 100Unit/mL quantity 60 for 77 day supply would be $150.00 per [facility]:        If the patient can use Novolog instead of Humalog please send a new prescription stating \"Novolog Flexpen\". If the patient must use Humalog I can initiate a Prior Authorization but the insurance may ask why Novolog cannot be used instead of Humalog.    If there's any questions feel free to contact me.    Thank You!    Zachery Rasmussen [facility] Liaison  [facility]  [email or web address]  Phone: [phone]  Fax: [phone]    "

## 2022-11-08 NOTE — TELEPHONE ENCOUNTER
HUMALOG 100 UNIT/ML injection        Last Written Prescription Date:  07/27/22  Last Fill Quantity: 70mL,   # refills: 0  Last Office Visit : 12/15/21  Future Office visit:  None scheduled    Routing refill request to provider for review/approval because:  Insulin - refilled per clinic

## 2022-11-09 RX ORDER — INSULIN ASPART 100 [IU]/ML
INJECTION, SOLUTION INTRAVENOUS; SUBCUTANEOUS
Qty: 70 ML | Refills: 1 | Status: SHIPPED | OUTPATIENT
Start: 2022-11-09 | End: 2023-03-15

## 2022-11-15 ENCOUNTER — MYC MEDICAL ADVICE (OUTPATIENT)
Dept: ENDOCRINOLOGY | Facility: CLINIC | Age: 27
End: 2022-11-15

## 2022-11-16 NOTE — TELEPHONE ENCOUNTER
"Per a note on 11/8, \"I did a test claim on patient's Humalog Kwikpen prescription and unfortunately it's not covered by the patient's insurance([employer]). The insurance prefers [employer]\".    Messaged patient and will await response on how she wants to proceed, or if she hears a different answer back on her insurance.     Susie Dsouza on 11/16/2022 at 8:30 AM    "

## 2022-11-17 NOTE — TELEPHONE ENCOUNTER
Looks like the PA was for Kwikpens not the vials.  Vials sent on 11/8.  Patient to let us know if we need to re-send it.     Susie Dsouza on 11/17/2022 at 8:33 AM

## 2023-01-01 ENCOUNTER — TRANSFERRED RECORDS (OUTPATIENT)
Dept: MULTI SPECIALTY CLINIC | Facility: CLINIC | Age: 28
End: 2023-01-01

## 2023-01-01 LAB — RETINOPATHY: NORMAL

## 2023-02-02 ENCOUNTER — E-VISIT (OUTPATIENT)
Dept: URGENT CARE | Facility: CLINIC | Age: 28
End: 2023-02-02
Payer: COMMERCIAL

## 2023-02-02 DIAGNOSIS — B96.89 ACUTE BACTERIAL SINUSITIS: ICD-10-CM

## 2023-02-02 DIAGNOSIS — B37.31 CANDIDIASIS OF VAGINA: Primary | ICD-10-CM

## 2023-02-02 DIAGNOSIS — J01.90 ACUTE BACTERIAL SINUSITIS: ICD-10-CM

## 2023-02-02 PROCEDURE — 99421 OL DIG E/M SVC 5-10 MIN: CPT | Performed by: FAMILY MEDICINE

## 2023-02-02 RX ORDER — FLUCONAZOLE 150 MG/1
150 TABLET ORAL ONCE
Qty: 1 TABLET | Refills: 0 | Status: SHIPPED | OUTPATIENT
Start: 2023-02-02 | End: 2023-02-02

## 2023-02-02 NOTE — PATIENT INSTRUCTIONS
Dear Feliciano Sahu    After reviewing your responses, I've been able to diagnose you with    Candidiasis of vagina  Acute bacterial sinusitis.      Based on your responses and diagnosis, I have prescribed   Orders Placed This Encounter     amoxicillin-clavulanate (AUGMENTIN) 875-125 MG tablet    to treat your symptoms. I have sent this to your pharmacy.?     It is also important to stay well hydrated, get lots of rest and take over-the-counter decongestants,?tylenol?or ibuprofen if you?are able to?take those medications per your primary care provider to help relieve discomfort.?     It is important that you take?all of?your prescribed medication even if your symptoms are improving after a few doses.? Taking?all of?your medicine helps prevent the symptoms from returning.?     If your symptoms worsen, you develop severe headache, vomiting, high fever (>102), or are not improving in 7 days, please contact your primary care provider for an appointment or visit any of our convenient Walk-in Care or Urgent Care Centers to be seen which can be found on our website?here.?     Thanks again for choosing?us?as your health care partner,?   ?  Kelley Gibbs MD?

## 2023-03-13 NOTE — PROGRESS NOTES
Outcome for 03/13/23 1:44 PM: Device upload instructions sent to patient via [employer] for tandem.  Rowan Farrar [facility]  Adult Endocrinology  [facility] [place]  Outcome for 03/14/23 9:02 AM: Data uploaded on Tandem  Ophelia Rasmussen [facility]  Adult Endocrinology  [facility]

## 2023-03-15 ENCOUNTER — OFFICE VISIT (OUTPATIENT)
Dept: ENDOCRINOLOGY | Facility: CLINIC | Age: 28
End: 2023-03-15
Payer: COMMERCIAL

## 2023-03-15 VITALS
DIASTOLIC BLOOD PRESSURE: 81 MMHG | OXYGEN SATURATION: 97 % | SYSTOLIC BLOOD PRESSURE: 144 MMHG | BODY MASS INDEX: 28.37 KG/M2 | HEART RATE: 106 BPM | HEIGHT: 62 IN | WEIGHT: 154.2 LBS

## 2023-03-15 DIAGNOSIS — E10.9 TYPE 1 DIABETES MELLITUS NOT AT GOAL (H): ICD-10-CM

## 2023-03-15 DIAGNOSIS — I10 BENIGN ESSENTIAL HYPERTENSION: ICD-10-CM

## 2023-03-15 LAB — HBA1C MFR BLD: 7.4 % (ref 4.3–?)

## 2023-03-15 PROCEDURE — 99214 OFFICE O/P EST MOD 30 MIN: CPT | Performed by: INTERNAL MEDICINE

## 2023-03-15 PROCEDURE — 95251 CONT GLUC MNTR ANALYSIS I&R: CPT | Performed by: INTERNAL MEDICINE

## 2023-03-15 PROCEDURE — 83036 HEMOGLOBIN GLYCOSYLATED A1C: CPT | Performed by: INTERNAL MEDICINE

## 2023-03-15 RX ORDER — METOPROLOL SUCCINATE 25 MG/1
75 TABLET, EXTENDED RELEASE ORAL DAILY
Qty: 270 TABLET | Refills: 3 | Status: SHIPPED | OUTPATIENT
Start: 2023-03-15 | End: 2024-03-28

## 2023-03-15 RX ORDER — INSULIN ASPART 100 [IU]/ML
INJECTION, SOLUTION INTRAVENOUS; SUBCUTANEOUS
Qty: 80 ML | Refills: 3 | Status: SHIPPED | OUTPATIENT
Start: 2023-03-15 | End: 2024-03-28

## 2023-03-15 RX ORDER — LISINOPRIL 10 MG/1
10 TABLET ORAL 2 TIMES DAILY
Qty: 180 TABLET | Refills: 3 | Status: SHIPPED | OUTPATIENT
Start: 2023-03-15 | End: 2024-03-20

## 2023-03-15 NOTE — PATIENT INSTRUCTIONS
Change correction factor to 25   If the postmeal BG is >180 on he sensor, change insulin to carb ratio to 5.5 or even 5     [facility]-Department of Endocrinology  Diabetes Educators:   Brandy Rodriguez, RN and Masha Patel RN  Clinic Nurse: Mireya RN  CMA's: Daniel   LPN: Susie  Scheduling/Clinic phone number : [phone]   Clinic Fax: [phone]  [facility] (after hours/weekends): [phone] option 4    Please call the number below to schedule your labs.    Lab    General [phone]   [facility] [phone]   [place] [phone]   [facility]  [phone]   [facility] [phone]   [place] [phone]   [facility]) [phone]   [facility] Walk-In Only   [place] [phone]   [place] [phone]   [place] [phone]   [place] [phone]     Please reach out to the following centers to schedule your imaging appointment:       Imaging (DEXA, CT, MRI, XRAY)    [facility] ([facility], [facility], [place]) [phone]   [facility] ([place], [place]) [phone]   [facility] ([facility]) [phone]   [facility] ([facility]) [phone]     Appointment Reminders:  * Please bring meter with for staff to download  * If you are due ONLY for an A1C, it is scheduled with the nurse and will be done in clinic. You do not need to schedule a lab appointment. Fasting is not required for an A1C.  * Refill request should be submitted to your pharmacy. They will contact clinic for approval.

## 2023-03-15 NOTE — NURSING NOTE
"Feliciano Sahu's goals for this visit include:   Chief Complaint   Patient presents with     Diabetes     She requests these members of her care team be copied on today's visit information: No    PCP: Abby Gordon    Referring Provider:  No referring provider defined for this encounter.    BP (!) 144/81 (BP Location: Left arm, Patient Position: Sitting, Cuff Size: Adult Regular)   Pulse 106   Ht 1.566 m (5' 1.65\")   Wt 69.9 kg (154 lb 3.2 oz)   SpO2 97%   BMI 28.52 kg/m      Do you need any medication refills at today's visit? Yes    "

## 2023-03-15 NOTE — Clinical Note
Please abstract the following data from this visit with this patient into the appropriate field in Epic:  Tests that can be patient reported without a hard copy:  Eye exam with ophthalmology on this date: 1/2023 Exam Location: [place]

## 2023-03-15 NOTE — LETTER
3/15/2023         RE: Feliciano Sahu  [address]  [facility] [id number]        Dear Colleague,    Thank you for referring your patient, Feliciano Sahu, to the [facility]. Please see a copy of my visit note below.    Outcome for 03/13/23 1:44 PM: Device upload instructions sent to patient via [employer] for tandem.  Rowan Farrar, [facility]  Adult Endocrinology  [facility]  Outcome for 03/14/23 9:02 AM: Data uploaded on Tandem  Ophelia Rasmussen [facility]  Adult Endocrinology  [facility]        The patient is seen in f/ for evaluation of diabetes. She was last seen in the clinic in 12/2021.    Feliciano Sahu is a 27 year old female diagnosed with type 1 diabetes in 2008, when she was found to be in DKA. Hemoglobin A1c used to be between 9.3 and 10.9 and it significantly decreased since 2016, to 8-8.5%. Most recent A1c was 7%, in October 2021.    Today, it was 7.4%. Her diabetes is known to be complicated by microalbuminuria.    Insulin pump (Tandem 9/2020) settings:  Basal rate at 12 midnight 1.5 units per hour  Insulin to carb ratio 1 U per 6 grams  Sensitivity 30   Target 120    I reviewed the insulin pump and sensor records.    On the sensor, the average glucose is 171, with 33% of the numbers within .  Total daily insulin dose is 75 units, of which 56% is basal insulin, 35% is food bolus, 2% is correction bolus and 7% is control IQ auto bolus.  She has been using the pump in the control IQ mode 94% of time.  The sensor reveals mild postprandial hyperglycemia occurring randomly, with no identifiable pattern.  There are no hypoglycemic episodes documented by the sensor.  She reports she can do a better job with carb counting and bolusing prior to eating.  According to the pump readings, most of the meals contain 15 or 30 g carbs.  Feels like the current insulin to carbohydrate ratio is accurate, if she does use it correctly.    Diabetes  complications:  Retinopathy: last eye exam 1/23. She is going to fax the note.   Nephropathy: h/o proteinuria dating back to 2010 - gradually improving   Neuropathy: no numbness or tingling sensation   She has been very rarely experiencing lows.   Feels shaky, sweaty and dizzy when BG drops in the 80s. Lowest BG she remembers was 35. She denies prior episodes of loss of consciousness due to hypoglycemia.  She does have glucagon at home.  She had one episode of diabetes ketoacidosis in 1999.  10/21 - LDL cholesterol 101, HDL cholesterol 44, .     Hypertension, currently treated with 10 mg lisinopril daily and 75 mg metoprolol succinate daily.  She does have a blood pressure cuff at home but she has not been checking her blood pressure.  On lab work from May 2018, screening for hyperaldosteronism was negative.    Past Medical History:   Diagnosis Date     Diabetes mellitus (H) 01/01/1998    Type 1     Diabetic eye exam (H) 08/09/2013    [facility]     Hypertension    Treated with Depo-Provera since, approximately, 2014    Past Surgical History:   Procedure Laterality Date     EYE SURGERY      lazy eye correction (strabismus?)     Current Medications       Current Outpatient Medications:      blood glucose monitoring (RACHEL CONTOUR NEXT) test strip, Use to test blood sugar 4 times daily or as directed., Disp: 100 strip, Rfl: 0     buPROPion (WELLBUTRIN XL) 150 MG 24 hr tablet, Take 1 tablet (150 mg) by mouth every morning, Disp: 90 tablet, Rfl: 3     cetirizine (ZYRTEC) 10 MG tablet, Take 1 tablet (10 mg) by mouth daily as needed for allergies, Disp: 90 tablet, Rfl: 1     Continuous Blood Gluc Sensor (DEXCOM G6 SENSOR) MISC, Change every 10 days., Disp: 9 each, Rfl: 3     Continuous Blood Gluc Transmit (DEXCOM G6 TRANSMITTER) MISC, Change every 3 months., Disp: 1 each, Rfl: 3     glucagon (GLUCAGON EMERGENCY) 1 MG kit, Inject 1 mg subcutaneously or intramuscularly., Disp: 1 each, Rfl: 3     HUMALOG 100  "UNIT/ML injection, INJECT UP TO 75 UNITS SUBCUTANEOUSLY PER DAY IN INSULIN PUMP FOR BASAL, BOLUS, AND PRIMING INSULIN PUMP TUBING, Disp: 70 mL, Rfl: 0     insulin aspart (NOVOLOG VIAL) 100 UNITS/ML vial, Take 75 units daily via insulin pump., Disp: 70 mL, Rfl: 1     insulin syringe 31G X 5/16\" 0.5 ML MISC, Use 4-6 times daily in the event of pump failure, Disp: 50 each, Rfl: 1     levonorgest-eth estrad 91-Day (SEASONIQUE) 0.15-0.03 &0.01 MG tablet, Take 1 tablet by mouth daily, Disp: 91 tablet, Rfl: 3     lisinopril (ZESTRIL) 10 MG tablet, Take 1 tablet (10 mg) by mouth 2 times daily, Disp: 180 tablet, Rfl: 1     metoprolol succinate ER (TOPROL XL) 25 MG 24 hr tablet, Take 3 tablets (75 mg) by mouth daily Please keep 3-15-23 clinic appt for refills., Disp: 270 tablet, Rfl: 1    Current Facility-Administered Medications:      medroxyPROGESTERone (DEPO-PROVERA) injection 150 mg, 150 mg, Intramuscular, Q90 Days, Cecilia Banks APRN CNP, 150 mg at 11/25/20 1130    Family History   Mother has hypothyroidism. Father has hypercholesterolemia. Grandfather melanoma. No known family history of hypertension, death at a young age, stroke.     Social History  Single. No children. She denies smoking, drinking alcohol or using illicit drugs. Occupation: nurse at [employer].     Review of Systems   Systemic:              No fatigue    Eye:                       No eye symptoms   Anmol-Laryngeal:      No anmol-laryngeal symptoms, no dysphagia, no voice hoarseness, no cough      Breast:                   No breast symptoms  Cardiovascular:     No cardiovascular symptoms, no CP or palpitations   Pulmonary:            No pulmonary symptoms, no cough or SOB    Gastrointestinal:    No gastrointestinal symptoms, no diarrhea or constipation   Genitourinary:        No genitourinary symptoms    Endocrine:             No endocrine symptoms, no heat or cold intolerance; MP absent on the BCP   Neurological:         " "No headaches, no tremor, no dizziness     Musculoskeletal:   No joint pain or muscle cramps   Skin:                      No skin symptoms   Psychological:       Gets anxious easily           Vital Signs     Previous Weights:    Wt Readings from Last 10 Encounters:   03/15/23 69.9 kg (154 lb 3.2 oz)   10/03/22 67.6 kg (149 lb)   02/03/22 65.1 kg (143 lb 8 oz)   12/15/21 63.1 kg (139 lb 3.2 oz)   05/04/21 67.1 kg (148 lb)   03/25/21 69.1 kg (152 lb 4 oz)   01/06/21 68.9 kg (152 lb)   11/25/20 68.3 kg (150 lb 8 oz)   08/28/20 67.6 kg (149 lb)   06/05/20 65.9 kg (145 lb 6 oz)     Vital Signs:  BP (!) 144/81 (BP Location: Left arm, Patient Position: Sitting, Cuff Size: Adult Regular)   Pulse 106   Ht 1.566 m (5' 1.65\")   Wt 69.9 kg (154 lb 3.2 oz)   SpO2 97%   BMI 28.52 kg/m      General appearance: she is well-developed, well-nourished, and in no distress.  Eyes: conjunctivae and extraocular motions are normal. Pupils are equal, round, and reactive to light. No lid lag, no stare.  HENT: oropharynx clear and moist; neck no JVD, no bruits, no thyromegaly, no palpable nodules  Cardiovascular: regular rhythm, heart rate in the 90s, no murmurs, distal pulses palpable, no edema  Respiratory: chest clear, no rales, no rhonchi  Musculoskeletal: normal tone and strength   Neurologic: reflexes normal and symmetric, no resting tremor  Psychiatric: affect and judgment normal   Feet: No lesions, sensation intact to monofilament testing    Lab Results  I reviewed prior lab results documented in Epic.      Lab Results   Component Value Date    A1C 7.0 (H) 10/19/2021    A1C 7.4 (H) 11/10/2020    A1C 7.4 (A) 07/10/2019    A1C 7.4 07/24/2018    A1C 7.8 01/02/2018    A1C 8.1 07/26/2016       Hemoglobin   Date Value Ref Range Status   09/22/2008 16.0 (H) 11.7 - 15.7 g/dL Final     Hematocrit   Date Value Ref Range Status   10/19/2021 39.6 35.0 - 47.0 % Final   11/10/2020 43.7 35.0 - 47.0 % Final     Cholesterol   Date Value Ref Range " Status   10/19/2021 173 <200 mg/dL Final   11/10/2020 152 <200 mg/dL Final     Cholesterol/HDL Ratio   Date Value Ref Range Status   01/03/2013 5.0 0.0 - 5.0 Final     HDL Cholesterol   Date Value Ref Range Status   11/10/2020 46 (L) >49 mg/dL Final     Direct Measure HDL   Date Value Ref Range Status   10/19/2021 44 (L) >=50 mg/dL Final     LDL Cholesterol Calculated   Date Value Ref Range Status   10/19/2021 101 (H) <=100 mg/dL Final   11/10/2020 71 <100 mg/dL Final     Comment:     Desirable:       <100 mg/dl     VLDL-Cholesterol   Date Value Ref Range Status   01/03/2013 31 (H) 0 - 30 mg/dL Final     Triglycerides   Date Value Ref Range Status   10/19/2021 140 <150 mg/dL Final   11/10/2020 177 (H) <150 mg/dL Final     Comment:     Borderline high:  150-199 mg/dl  High:             200-499 mg/dl  Very high:       >499 mg/dl  Fasting specimen       Albumin Urine mg/L   Date Value Ref Range Status   02/17/2022 19 mg/L Final   11/10/2020 104 mg/L Final     TSH   Date Value Ref Range Status   10/19/2021 2.32 0.40 - 4.00 mU/L Final   11/10/2020 3.27 0.40 - 4.00 mU/L Final         Last Basic Metabolic Panel:    Sodium   Date Value Ref Range Status   10/19/2021 138 133 - 144 mmol/L Final   11/10/2020 137 133 - 144 mmol/L Final     Potassium   Date Value Ref Range Status   10/19/2021 4.3 3.4 - 5.3 mmol/L Final   11/10/2020 4.3 3.4 - 5.3 mmol/L Final     Chloride   Date Value Ref Range Status   10/19/2021 110 (H) 94 - 109 mmol/L Final   11/10/2020 108 94 - 109 mmol/L Final     Calcium   Date Value Ref Range Status   10/19/2021 8.6 8.5 - 10.1 mg/dL Final   11/10/2020 9.0 8.5 - 10.1 mg/dL Final     Carbon Dioxide   Date Value Ref Range Status   11/10/2020 21 20 - 32 mmol/L Final     Carbon Dioxide (CO2)   Date Value Ref Range Status   10/19/2021 25 20 - 32 mmol/L Final     Urea Nitrogen   Date Value Ref Range Status   10/19/2021 11 7 - 30 mg/dL Final   11/10/2020 10 7 - 30 mg/dL Final     Creatinine   Date Value Ref Range  Status   10/19/2021 0.83 0.52 - 1.04 mg/dL Final   11/10/2020 0.59 0.52 - 1.04 mg/dL Final     GFR Estimate   Date Value Ref Range Status   10/19/2021 >90 >60 mL/min/1.73m2 Final     Comment:     As of July 11, 2021, eGFR is calculated by the CKD-EPI creatinine equation, without race adjustment. eGFR can be influenced by muscle mass, exercise, and diet. The reported eGFR is an estimation only and is only applicable if the renal function is stable.   11/10/2020 >90 >60 mL/min/[1.73_m2] Final     Comment:     Non  GFR Calc  Starting 12/18/2018, serum creatinine based estimated GFR (eGFR) will be   calculated using the Chronic Kidney Disease Epidemiology Collaboration   (CKD-EPI) equation.       Glucose   Date Value Ref Range Status   10/19/2021 156 (H) 70 - 99 mg/dL Final   11/10/2020 182 (H) 70 - 99 mg/dL Final     Comment:     Fasting specimen       AST   Date Value Ref Range Status   10/19/2021 17 0 - 45 U/L Final   11/10/2020 27 0 - 45 U/L Final     ALT   Date Value Ref Range Status   10/19/2021 29 0 - 50 U/L Final   11/10/2020 53 (H) 0 - 50 U/L Final     Albumin   Date Value Ref Range Status   10/19/2021 3.8 3.4 - 5.0 g/dL Final   11/10/2020 3.9 3.4 - 5.0 g/dL Final     TSH   Date Value Ref Range Status   10/19/2021 2.32 0.40 - 4.00 mU/L Final   11/10/2020 3.27 0.40 - 4.00 mU/L Final       Assessment     1. Type 1 diabetes with overall suboptimal glycemic control, complicated by mild diabetic retinopathy and microalbuminuria.    Recommendations:  Change sensitivity to 25  Always bolus prior to food intake, ideally 15 minutes prior  Avoid entering fake carbs as this can result in hypoglycemic episodes; use correction bolus recommended by the pump.   If, after correctly entering the carbohydrates, her postmeal blood sugar is persistently above 180, I instructed her to change the insulin to carbohydrate ratio to 5.5 or even 5  Have the pump downloaded for my review as needed  Schedule lab work,  fasting    2.  Mild goiter on clinical exam  Clinically, the patient is euthyroid.     3.  Hypertension, uncontrolled  Recommended to check her blood pressure at home and contact us in 1 or 2 weeks with the numbers.    Orders Placed This Encounter   Procedures     Continuous Glucose Monitoring >=72 hours PHYS INTERP     Comprehensive metabolic panel     Lipid panel reflex to direct LDL Fasting     Hematocrit     Albumin Random Urine Quantitative with Creat Ratio     Hemoglobin A1c     Hemoglobin A1c POCT     Answers for HPI/ROS submitted by the patient on 3/14/2023  General Symptoms: No  Skin Symptoms: No  HENT Symptoms: No  EYE SYMPTOMS: No  HEART SYMPTOMS: No  LUNG SYMPTOMS: No  INTESTINAL SYMPTOMS: No  URINARY SYMPTOMS: No  GYNECOLOGIC SYMPTOMS: No  BREAST SYMPTOMS: No  SKELETAL SYMPTOMS: No  BLOOD SYMPTOMS: No  NERVOUS SYSTEM SYMPTOMS: No  MENTAL HEALTH SYMPTOMS: No          Again, thank you for allowing me to participate in the care of your patient.        Sincerely,        Eda De Dios MD

## 2023-03-15 NOTE — PROGRESS NOTES
The patient is seen in f/up for evaluation of diabetes. She was last seen in the clinic in 12/2021.    Feliciano Sahu is a 27 year old female diagnosed with type 1 diabetes in 2008, when she was found to be in DKA. Hemoglobin A1c used to be between 9.3 and 10.9 and it significantly decreased since 2016, to 8-8.5%. Most recent A1c was 7%, in October 2021.    Today, it was 7.4%. Her diabetes is known to be complicated by microalbuminuria.    Insulin pump (Tandem 9/2020) settings:  Basal rate at 12 midnight 1.5 units per hour  Insulin to carb ratio 1 U per 6 grams  Sensitivity 30   Target 120    I reviewed the insulin pump and sensor records.    On the sensor, the average glucose is 171, with 33% of the numbers within .  Total daily insulin dose is 75 units, of which 56% is basal insulin, 35% is food bolus, 2% is correction bolus and 7% is control IQ auto bolus.  She has been using the pump in the control IQ mode 94% of time.  The sensor reveals mild postprandial hyperglycemia occurring randomly, with no identifiable pattern.  There are no hypoglycemic episodes documented by the sensor.  She reports she can do a better job with carb counting and bolusing prior to eating.  According to the pump readings, most of the meals contain 15 or 30 g carbs.  Feels like the current insulin to carbohydrate ratio is accurate, if she does use it correctly.    Diabetes complications:  Retinopathy: last eye exam 1/23. She is going to fax the note.   Nephropathy: h/o proteinuria dating back to 2010 - gradually improving   Neuropathy: no numbness or tingling sensation   She has been very rarely experiencing lows.   Feels shaky, sweaty and dizzy when BG drops in the 80s. Lowest BG she remembers was 35. She denies prior episodes of loss of consciousness due to hypoglycemia.  She does have glucagon at home.  She had one episode of diabetes ketoacidosis in 1999.  10/21 - LDL cholesterol 101, HDL cholesterol 44, .  "    Hypertension, currently treated with 10 mg lisinopril daily and 75 mg metoprolol succinate daily.  She does have a blood pressure cuff at home but she has not been checking her blood pressure.  On lab work from May 2018, screening for hyperaldosteronism was negative.    Past Medical History:   Diagnosis Date     Diabetes mellitus (H) 01/01/1998    Type 1     Diabetic eye exam (H) 08/09/2013    [facility]     Hypertension    Treated with Depo-Provera since, approximately, 2014    Past Surgical History:   Procedure Laterality Date     EYE SURGERY      lazy eye correction (strabismus?)     Current Medications       Current Outpatient Medications:      blood glucose monitoring (Natanael Ulien CONTOUR NEXT) test strip, Use to test blood sugar 4 times daily or as directed., Disp: 100 strip, Rfl: 0     buPROPion (WELLBUTRIN XL) 150 MG 24 hr tablet, Take 1 tablet (150 mg) by mouth every morning, Disp: 90 tablet, Rfl: 3     cetirizine (ZYRTEC) 10 MG tablet, Take 1 tablet (10 mg) by mouth daily as needed for allergies, Disp: 90 tablet, Rfl: 1     Continuous Blood Gluc Sensor (DEXCOM G6 SENSOR) MISC, Change every 10 days., Disp: 9 each, Rfl: 3     Continuous Blood Gluc Transmit (DEXCOM G6 TRANSMITTER) MISC, Change every 3 months., Disp: 1 each, Rfl: 3     glucagon (GLUCAGON EMERGENCY) 1 MG kit, Inject 1 mg subcutaneously or intramuscularly., Disp: 1 each, Rfl: 3     HUMALOG 100 UNIT/ML injection, INJECT UP TO 75 UNITS SUBCUTANEOUSLY PER DAY IN INSULIN PUMP FOR BASAL, BOLUS, AND PRIMING INSULIN PUMP TUBING, Disp: 70 mL, Rfl: 0     insulin aspart (NOVOLOG VIAL) 100 UNITS/ML vial, Take 75 units daily via insulin pump., Disp: 70 mL, Rfl: 1     insulin syringe 31G X 5/16\" 0.5 ML MISC, Use 4-6 times daily in the event of pump failure, Disp: 50 each, Rfl: 1     levonorgest-eth estrad 91-Day (SEASONIQUE) 0.15-0.03 &0.01 MG tablet, Take 1 tablet by mouth daily, Disp: 91 tablet, Rfl: 3     lisinopril (ZESTRIL) 10 MG tablet, Take 1 " tablet (10 mg) by mouth 2 times daily, Disp: 180 tablet, Rfl: 1     metoprolol succinate ER (TOPROL XL) 25 MG 24 hr tablet, Take 3 tablets (75 mg) by mouth daily Please keep 3-15-23 clinic appt for refills., Disp: 270 tablet, Rfl: 1    Current Facility-Administered Medications:      medroxyPROGESTERone (DEPO-PROVERA) injection 150 mg, 150 mg, Intramuscular, Q90 Days, Cecilia Banks, CLEMENCIA CNP, 150 mg at 11/25/20 1130    Family History   Mother has hypothyroidism. Father has hypercholesterolemia. Grandfather melanoma. No known family history of hypertension, death at a young age, stroke.     Social History  Single. No children. She denies smoking, drinking alcohol or using illicit drugs. Occupation: nurse at [employer].     Review of Systems   Systemic:              No fatigue    Eye:                       No eye symptoms   Anmol-Laryngeal:      No anmol-laryngeal symptoms, no dysphagia, no voice hoarseness, no cough      Breast:                   No breast symptoms  Cardiovascular:     No cardiovascular symptoms, no CP or palpitations   Pulmonary:            No pulmonary symptoms, no cough or SOB    Gastrointestinal:    No gastrointestinal symptoms, no diarrhea or constipation   Genitourinary:        No genitourinary symptoms    Endocrine:             No endocrine symptoms, no heat or cold intolerance; MP absent on the BCP   Neurological:         No headaches, no tremor, no dizziness     Musculoskeletal:   No joint pain or muscle cramps   Skin:                      No skin symptoms   Psychological:       Gets anxious easily           Vital Signs     Previous Weights:    Wt Readings from Last 10 Encounters:   03/15/23 69.9 kg (154 lb 3.2 oz)   10/03/22 67.6 kg (149 lb)   02/03/22 65.1 kg (143 lb 8 oz)   12/15/21 63.1 kg (139 lb 3.2 oz)   05/04/21 67.1 kg (148 lb)   03/25/21 69.1 kg (152 lb 4 oz)   01/06/21 68.9 kg (152 lb)   11/25/20 68.3 kg (150 lb 8 oz)   08/28/20 67.6 kg (149 lb)  "  06/05/20 65.9 kg (145 lb 6 oz)     Vital Signs:  BP (!) 144/81 (BP Location: Left arm, Patient Position: Sitting, Cuff Size: Adult Regular)   Pulse 106   Ht 1.566 m (5' 1.65\")   Wt 69.9 kg (154 lb 3.2 oz)   SpO2 97%   BMI 28.52 kg/m      General appearance: she is well-developed, well-nourished, and in no distress.  Eyes: conjunctivae and extraocular motions are normal. Pupils are equal, round, and reactive to light. No lid lag, no stare.  HENT: oropharynx clear and moist; neck no JVD, no bruits, no thyromegaly, no palpable nodules  Cardiovascular: regular rhythm, heart rate in the 90s, no murmurs, distal pulses palpable, no edema  Respiratory: chest clear, no rales, no rhonchi  Musculoskeletal: normal tone and strength   Neurologic: reflexes normal and symmetric, no resting tremor  Psychiatric: affect and judgment normal   Feet: No lesions, sensation intact to monofilament testing    Lab Results  I reviewed prior lab results documented in Epic.      Lab Results   Component Value Date    A1C 7.0 (H) 10/19/2021    A1C 7.4 (H) 11/10/2020    A1C 7.4 (A) 07/10/2019    A1C 7.4 07/24/2018    A1C 7.8 01/02/2018    A1C 8.1 07/26/2016       Hemoglobin   Date Value Ref Range Status   09/22/2008 16.0 (H) 11.7 - 15.7 g/dL Final     Hematocrit   Date Value Ref Range Status   10/19/2021 39.6 35.0 - 47.0 % Final   11/10/2020 43.7 35.0 - 47.0 % Final     Cholesterol   Date Value Ref Range Status   10/19/2021 173 <200 mg/dL Final   11/10/2020 152 <200 mg/dL Final     Cholesterol/HDL Ratio   Date Value Ref Range Status   01/03/2013 5.0 0.0 - 5.0 Final     HDL Cholesterol   Date Value Ref Range Status   11/10/2020 46 (L) >49 mg/dL Final     Direct Measure HDL   Date Value Ref Range Status   10/19/2021 44 (L) >=50 mg/dL Final     LDL Cholesterol Calculated   Date Value Ref Range Status   10/19/2021 101 (H) <=100 mg/dL Final   11/10/2020 71 <100 mg/dL Final     Comment:     Desirable:       <100 mg/dl     VLDL-Cholesterol   Date " Value Ref Range Status   01/03/2013 31 (H) 0 - 30 mg/dL Final     Triglycerides   Date Value Ref Range Status   10/19/2021 140 <150 mg/dL Final   11/10/2020 177 (H) <150 mg/dL Final     Comment:     Borderline high:  150-199 mg/dl  High:             200-499 mg/dl  Very high:       >499 mg/dl  Fasting specimen       Albumin Urine mg/L   Date Value Ref Range Status   02/17/2022 19 mg/L Final   11/10/2020 104 mg/L Final     TSH   Date Value Ref Range Status   10/19/2021 2.32 0.40 - 4.00 mU/L Final   11/10/2020 3.27 0.40 - 4.00 mU/L Final         Last Basic Metabolic Panel:    Sodium   Date Value Ref Range Status   10/19/2021 138 133 - 144 mmol/L Final   11/10/2020 137 133 - 144 mmol/L Final     Potassium   Date Value Ref Range Status   10/19/2021 4.3 3.4 - 5.3 mmol/L Final   11/10/2020 4.3 3.4 - 5.3 mmol/L Final     Chloride   Date Value Ref Range Status   10/19/2021 110 (H) 94 - 109 mmol/L Final   11/10/2020 108 94 - 109 mmol/L Final     Calcium   Date Value Ref Range Status   10/19/2021 8.6 8.5 - 10.1 mg/dL Final   11/10/2020 9.0 8.5 - 10.1 mg/dL Final     Carbon Dioxide   Date Value Ref Range Status   11/10/2020 21 20 - 32 mmol/L Final     Carbon Dioxide (CO2)   Date Value Ref Range Status   10/19/2021 25 20 - 32 mmol/L Final     Urea Nitrogen   Date Value Ref Range Status   10/19/2021 11 7 - 30 mg/dL Final   11/10/2020 10 7 - 30 mg/dL Final     Creatinine   Date Value Ref Range Status   10/19/2021 0.83 0.52 - 1.04 mg/dL Final   11/10/2020 0.59 0.52 - 1.04 mg/dL Final     GFR Estimate   Date Value Ref Range Status   10/19/2021 >90 >60 mL/min/1.73m2 Final     Comment:     As of July 11, 2021, eGFR is calculated by the CKD-EPI creatinine equation, without race adjustment. eGFR can be influenced by muscle mass, exercise, and diet. The reported eGFR is an estimation only and is only applicable if the renal function is stable.   11/10/2020 >90 >60 mL/min/[1.73_m2] Final     Comment:     Non  GFR  Calc  Starting 12/18/2018, serum creatinine based estimated GFR (eGFR) will be   calculated using the Chronic Kidney Disease Epidemiology Collaboration   (CKD-EPI) equation.       Glucose   Date Value Ref Range Status   10/19/2021 156 (H) 70 - 99 mg/dL Final   11/10/2020 182 (H) 70 - 99 mg/dL Final     Comment:     Fasting specimen       AST   Date Value Ref Range Status   10/19/2021 17 0 - 45 U/L Final   11/10/2020 27 0 - 45 U/L Final     ALT   Date Value Ref Range Status   10/19/2021 29 0 - 50 U/L Final   11/10/2020 53 (H) 0 - 50 U/L Final     Albumin   Date Value Ref Range Status   10/19/2021 3.8 3.4 - 5.0 g/dL Final   11/10/2020 3.9 3.4 - 5.0 g/dL Final     TSH   Date Value Ref Range Status   10/19/2021 2.32 0.40 - 4.00 mU/L Final   11/10/2020 3.27 0.40 - 4.00 mU/L Final       Assessment     1. Type 1 diabetes with overall suboptimal glycemic control, complicated by mild diabetic retinopathy and microalbuminuria.    Recommendations:  Change sensitivity to 25  Always bolus prior to food intake, ideally 15 minutes prior  Avoid entering fake carbs as this can result in hypoglycemic episodes; use correction bolus recommended by the pump.   If, after correctly entering the carbohydrates, her postmeal blood sugar is persistently above 180, I instructed her to change the insulin to carbohydrate ratio to 5.5 or even 5  Have the pump downloaded for my review as needed  Schedule lab work, fasting    2.  Mild goiter on clinical exam  Clinically, the patient is euthyroid.     3.  Hypertension, uncontrolled  Recommended to check her blood pressure at home and contact us in 1 or 2 weeks with the numbers.    Orders Placed This Encounter   Procedures     Continuous Glucose Monitoring >=72 hours PHYS INTER     Comprehensive metabolic panel     Lipid panel reflex to direct LDL Fasting     Hematocrit     Albumin Random Urine Quantitative with Creat Ratio     Hemoglobin A1c     Hemoglobin A1c POCT     Answers for HPI/ROS submitted  by the patient on 3/14/2023  General Symptoms: No  Skin Symptoms: No  HENT Symptoms: No  EYE SYMPTOMS: No  HEART SYMPTOMS: No  LUNG SYMPTOMS: No  INTESTINAL SYMPTOMS: No  URINARY SYMPTOMS: No  GYNECOLOGIC SYMPTOMS: No  BREAST SYMPTOMS: No  SKELETAL SYMPTOMS: No  BLOOD SYMPTOMS: No  NERVOUS SYSTEM SYMPTOMS: No  MENTAL HEALTH SYMPTOMS: No

## 2023-03-23 ENCOUNTER — LAB (OUTPATIENT)
Dept: LAB | Facility: CLINIC | Age: 28
End: 2023-03-23
Payer: COMMERCIAL

## 2023-03-23 DIAGNOSIS — E10.9 TYPE 1 DIABETES MELLITUS NOT AT GOAL (H): ICD-10-CM

## 2023-03-23 LAB
ALBUMIN SERPL BCG-MCNC: 4.2 G/DL (ref 3.5–5.2)
ALP SERPL-CCNC: 40 U/L (ref 35–104)
ALT SERPL W P-5'-P-CCNC: 31 U/L (ref 10–35)
ANION GAP SERPL CALCULATED.3IONS-SCNC: 11 MMOL/L (ref 7–15)
AST SERPL W P-5'-P-CCNC: 26 U/L (ref 10–35)
BILIRUB SERPL-MCNC: 0.2 MG/DL
BUN SERPL-MCNC: 11.8 MG/DL (ref 6–20)
CALCIUM SERPL-MCNC: 9.7 MG/DL (ref 8.6–10)
CHLORIDE SERPL-SCNC: 101 MMOL/L (ref 98–107)
CHOLEST SERPL-MCNC: 186 MG/DL
CREAT SERPL-MCNC: 0.78 MG/DL (ref 0.51–0.95)
CREAT UR-MCNC: 298.8 MG/DL
DEPRECATED HCO3 PLAS-SCNC: 22 MMOL/L (ref 22–29)
GFR SERPL CREATININE-BSD FRML MDRD: >90 ML/MIN/1.73M2
GLUCOSE SERPL-MCNC: 209 MG/DL (ref 70–99)
HBA1C MFR BLD: 6.8 %
HCT VFR BLD AUTO: 39.6 % (ref 35–47)
HDLC SERPL-MCNC: 47 MG/DL
LDLC SERPL CALC-MCNC: 112 MG/DL
MICROALBUMIN UR-MCNC: 123.2 MG/L
MICROALBUMIN/CREAT UR: 41.23 MG/G CR (ref 0–25)
NONHDLC SERPL-MCNC: 139 MG/DL
POTASSIUM SERPL-SCNC: 4.8 MMOL/L (ref 3.4–5.3)
PROT SERPL-MCNC: 7.6 G/DL (ref 6.4–8.3)
SODIUM SERPL-SCNC: 134 MMOL/L (ref 136–145)
TRIGL SERPL-MCNC: 135 MG/DL

## 2023-03-23 PROCEDURE — 36415 COLL VENOUS BLD VENIPUNCTURE: CPT

## 2023-03-23 PROCEDURE — 80053 COMPREHEN METABOLIC PANEL: CPT

## 2023-03-23 PROCEDURE — 82570 ASSAY OF URINE CREATININE: CPT

## 2023-03-23 PROCEDURE — 82043 UR ALBUMIN QUANTITATIVE: CPT

## 2023-03-23 PROCEDURE — 83036 HEMOGLOBIN GLYCOSYLATED A1C: CPT

## 2023-03-23 PROCEDURE — 80061 LIPID PANEL: CPT

## 2023-03-23 PROCEDURE — 85014 HEMATOCRIT: CPT

## 2023-03-28 NOTE — RESULT ENCOUNTER NOTE
There is a small amount of proteins in the urine.  A1c looks good at 6.8.  The sodium level is a little lowish and this is a consequence of having a high glucose at the time you had the blood drawn.  Lipid panel is fairly stable compared with the prior lab results.  The other labs are normal.

## 2023-04-20 NOTE — PATIENT INSTRUCTIONS
April 20, 2023     Patient: Ev Franklin   YOB: 1970   Date of Visit: 4/20/2023       To Whom it May Concern:    Ev Franklin was seen in my clinic on 4/20/2023 at 5:00 pm.    Please allow  Ev to have light duty through 4/26/2023.    Sincerely,         Khloe Momin PA-C    Medical information is confidential and cannot be disclosed without the written consent of the patient or her representative.         Preventive Health Recommendations  Female Ages 18 to 25     Yearly exam:     See your health care provider every year in order to  o Review health changes.   o Discuss preventive care.    o Review your medicines if your doctor has prescribed any.      You should be tested each year for STDs (sexually transmitted diseases).       After age 20, talk to your provider about how often you should have cholesterol testing.      Starting at age 21, get a Pap test every three years. If you have an abnormal result, your doctor may have you test more often.      If you are at risk for diabetes, you should have a diabetes test (fasting glucose).     Shots:     Get a flu shot each year.     Get a tetanus shot every 10 years.     Consider getting the shot (vaccine) that prevents cervical cancer (Gardasil).    Nutrition:     Eat at least 5 servings of fruits and vegetables each day.    Eat whole-grain bread, whole-wheat pasta and brown rice instead of white grains and rice.    Talk to your provider about Calcium and Vitamin D.     Lifestyle    Exercise at least 150 minutes a week each week (30 minutes a day, 5 days a week). This will help you control your weight and prevent disease.    Limit alcohol to one drink per day.    No smoking.     Wear sunscreen to prevent skin cancer.    See your dentist every six months for an exam and cleaning.

## 2023-04-27 ENCOUNTER — MYC MEDICAL ADVICE (OUTPATIENT)
Dept: FAMILY MEDICINE | Facility: CLINIC | Age: 28
End: 2023-04-27
Payer: COMMERCIAL

## 2023-04-27 DIAGNOSIS — J32.0 CHRONIC MAXILLARY SINUSITIS: ICD-10-CM

## 2023-04-27 RX ORDER — CETIRIZINE HYDROCHLORIDE 10 MG/1
10 TABLET ORAL DAILY PRN
Qty: 90 TABLET | Refills: 1 | Status: SHIPPED | OUTPATIENT
Start: 2023-04-27 | End: 2024-06-02

## 2023-07-11 DIAGNOSIS — R80.9 TYPE 1 DIABETES MELLITUS WITH MICROALBUMINURIA (H): ICD-10-CM

## 2023-07-11 DIAGNOSIS — E10.29 TYPE 1 DIABETES MELLITUS WITH MICROALBUMINURIA (H): ICD-10-CM

## 2023-07-11 RX ORDER — PROCHLORPERAZINE 25 MG/1
SUPPOSITORY RECTAL
Qty: 1 EACH | Refills: 3 | Status: SHIPPED | OUTPATIENT
Start: 2023-07-11 | End: 2024-09-26

## 2023-07-11 RX ORDER — PROCHLORPERAZINE 25 MG/1
SUPPOSITORY RECTAL
Qty: 9 EACH | Refills: 3 | Status: SHIPPED | OUTPATIENT
Start: 2023-07-11 | End: 2024-07-09

## 2023-09-05 ENCOUNTER — PATIENT OUTREACH (OUTPATIENT)
Dept: CARE COORDINATION | Facility: CLINIC | Age: 28
End: 2023-09-05
Payer: COMMERCIAL

## 2023-09-13 DIAGNOSIS — F41.9 ANXIETY: ICD-10-CM

## 2023-09-13 RX ORDER — BUPROPION HYDROCHLORIDE 150 MG/1
150 TABLET ORAL EVERY MORNING
Qty: 90 TABLET | Refills: 0 | Status: SHIPPED | OUTPATIENT
Start: 2023-09-13 | End: 2023-12-26

## 2023-10-16 ENCOUNTER — E-VISIT (OUTPATIENT)
Dept: FAMILY MEDICINE | Facility: CLINIC | Age: 28
End: 2023-10-16
Payer: COMMERCIAL

## 2023-10-16 DIAGNOSIS — B37.9 ANTIBIOTIC-INDUCED YEAST INFECTION: ICD-10-CM

## 2023-10-16 DIAGNOSIS — J01.00 ACUTE NON-RECURRENT MAXILLARY SINUSITIS: Primary | ICD-10-CM

## 2023-10-16 DIAGNOSIS — T36.95XA ANTIBIOTIC-INDUCED YEAST INFECTION: ICD-10-CM

## 2023-10-16 PROCEDURE — 99421 OL DIG E/M SVC 5-10 MIN: CPT | Performed by: PHYSICIAN ASSISTANT

## 2023-10-16 RX ORDER — FLUCONAZOLE 150 MG/1
150 TABLET ORAL ONCE
Qty: 1 TABLET | Refills: 0 | Status: SHIPPED | OUTPATIENT
Start: 2023-10-16 | End: 2023-10-16

## 2023-10-16 NOTE — PATIENT INSTRUCTIONS
Thank you for choosing us for your care. I have placed an order for a prescription so that you can start treatment. View your full visit summary for details by clicking on the link below. Your pharmacist will able to address any questions you may have about the medication.     If you're not feeling better within 5-7 days, please schedule an appointment.  You can schedule an appointment right here in [facility], or call [phone]  If the visit is for the same symptoms as your eVisit, we'll refund the cost of your eVisit if seen within seven days.

## 2023-11-12 ENCOUNTER — HEALTH MAINTENANCE LETTER (OUTPATIENT)
Age: 28
End: 2023-11-12

## 2023-11-21 ENCOUNTER — E-VISIT (OUTPATIENT)
Dept: FAMILY MEDICINE | Facility: CLINIC | Age: 28
End: 2023-11-21
Payer: COMMERCIAL

## 2023-11-21 DIAGNOSIS — N39.0 ACUTE UTI (URINARY TRACT INFECTION): Primary | ICD-10-CM

## 2023-11-21 PROCEDURE — 99421 OL DIG E/M SVC 5-10 MIN: CPT | Performed by: PHYSICIAN ASSISTANT

## 2023-11-21 RX ORDER — NITROFURANTOIN 25; 75 MG/1; MG/1
100 CAPSULE ORAL 2 TIMES DAILY
Qty: 10 CAPSULE | Refills: 0 | Status: SHIPPED | OUTPATIENT
Start: 2023-11-21 | End: 2023-11-26

## 2023-11-21 NOTE — PATIENT INSTRUCTIONS
Dear Feliciano Sahu    After reviewing your responses, I've been able to diagnose you with a urinary tract infection, which is a common infection of the bladder with bacteria.  This is not a sexually transmitted infection, though urinating immediately after intercourse can help prevent infections.  Drinking lots of fluids is also helpful to clear your current infection and prevent the next one.      I have sent a prescription for antibiotics to your pharmacy to treat this infection.    It is important that you take all of your prescribed medication even if your symptoms are improving after a few doses.  Taking all of your medicine helps prevent the symptoms from returning.     If your symptoms worsen, you develop pain in your back or stomach, develop fevers, or are not improving in 5 days, please contact your primary care provider for an appointment or visit any of our convenient Walk-in or Urgent Care Centers to be seen, which can be found on our website here.    Thanks again for choosing us as your health care partner,    Muna Jay PA-C  Urinary Tract Infection (UTI) in Women: Care Instructions  Overview     A urinary tract infection, or UTI, is a general term for an infection anywhere between the kidneys and the urethra (where urine comes out). Most UTIs are bladder infections. They often cause pain or burning when you urinate.  UTIs are caused by bacteria and can be cured with antibiotics. Be sure to complete your treatment so that the infection does not get worse.  Follow-up care is a key part of your treatment and safety. Be sure to make and go to all appointments, and call your doctor if you are having problems. It's also a good idea to know your test results and keep a list of the medicines you take.  How can you care for yourself at home?  Take your antibiotics as directed. Do not stop taking them just because you feel better. You need to take the full course of antibiotics.  Drink extra water and  "other fluids for the next day or two. This will help make the urine less concentrated and help wash out the bacteria that are causing the infection. (If you have kidney, heart, or liver disease and have to limit fluids, talk with your doctor before you increase the amount of fluids you drink.)  Avoid drinks that are carbonated or have caffeine. They can irritate the bladder.  Urinate often. Try to empty your bladder each time.  To relieve pain, take a hot bath or lay a heating pad set on low over your lower belly or genital area. Never go to sleep with a heating pad in place.  To prevent UTIs  Drink plenty of water each day. This helps you urinate often, which clears bacteria from your system. (If you have kidney, heart, or liver disease and have to limit fluids, talk with your doctor before you increase the amount of fluids you drink.)  Urinate when you need to.  If you are sexually active, urinate right after you have sex.  Change sanitary pads often.  Avoid douches, bubble baths, feminine hygiene sprays, and other feminine hygiene products that have deodorants.  After going to the bathroom, wipe from front to back.  When should you call for help?   Call your doctor now or seek immediate medical care if:    Symptoms such as fever, chills, nausea, or vomiting get worse or appear for the first time.     You have new pain in your back just below your rib cage. This is called flank pain.     There is new blood or pus in your urine.     You have any problems with your antibiotic medicine.   Watch closely for changes in your health, and be sure to contact your doctor if:    You are not getting better after taking an antibiotic for 2 days.     Your symptoms go away but then come back.   Where can you learn more?  Go to https://www.healthwise.net/patiented  Enter K848 in the search box to learn more about \"Urinary Tract Infection (UTI) in Women: Care Instructions.\"  Current as of: February 28, 2023               Content " Version: 13.8 2006-2023 [employer].   Care instructions adapted under license by your healthcare professional. If you have questions about a medical condition or this instruction, always ask your healthcare professional. [employer] disclaims any warranty or liability for your use of this information.

## 2023-12-22 DIAGNOSIS — Z30.011 ENCOUNTER FOR INITIAL PRESCRIPTION OF CONTRACEPTIVE PILLS: ICD-10-CM

## 2023-12-22 RX ORDER — LEVONORGESTREL AND ETHINYL ESTRADIOL AND ETHINYL ESTRADIOL 150-30(84)
1 KIT ORAL DAILY
Qty: 91 TABLET | Refills: 0 | Status: SHIPPED | OUTPATIENT
Start: 2023-12-22 | End: 2024-03-27

## 2023-12-26 DIAGNOSIS — F41.9 ANXIETY: ICD-10-CM

## 2023-12-26 RX ORDER — BUPROPION HYDROCHLORIDE 150 MG/1
150 TABLET ORAL EVERY MORNING
Qty: 90 TABLET | Refills: 0 | Status: SHIPPED | OUTPATIENT
Start: 2023-12-26 | End: 2024-03-27

## 2024-02-16 NOTE — TELEPHONE ENCOUNTER
Prescription approved per [facility] Refill Protocol.     Completed Therapy?: No Retinoid Dermatitis Normal Treatment: I recommended more frequent application of Cetaphil or CeraVe to the areas of dermatitis. Xerosis Normal Treatment: I recommended application of Cetaphil or CeraVe numerous times a day and before going to bed to all dry areas. Counseling Text: I reviewed the side effect in detail. Patient should get monthly blood tests, not donate blood, not drive at night if vision affected, and not share medication. Headache Monitoring: I recommended monitoring the headaches for now. There is no evidence of increased intracranial pressure. They were instructed to call if the headaches are worsening. Xerosis Aggressive Treatment: I recommended application of Cetaphil or CeraVe numerous times a day and before going to bed to all dry areas. I also prescribed a topical steroid for twice daily use. Are Labs Available For Review?: Yes Retinoid Dermatitis Aggressive Treatment: I recommended more frequent application of Cetaphil or CeraVe to the areas of dermatitis. I also prescribed a topical steroid for twice daily use until the dermatitis resolves. Female Pregnancy Counseling Text: Female patients should also be on two forms of birth control while taking this medication and for one month after their last dose. Use Therapeutic Ranged Or Therapeutic Target: please select Range or Target Ipledge Number (Optional): [id number] Myalgia Monitoring: I explained this is common when taking isotretinoin. If this worsens they will contact us. Cheilitis Normal Treatment: I recommended application of Vaseline or Aquaphor numerous times a day (as often as every hour) and before going to bed. Patient Weight (Optional But Required For Cumulative Dose-Numbers And Decimals Only): 110 Hypercholesterolemia Monitoring: I explained this is common when taking isotretinoin. We will monitor closely. Pounds Preamble Statement (Weight Entered In Details Tab): Reported Weight in pounds: Kilograms Preamble Statement (Weight Entered In Details Tab): Reported Weight in kilograms: Lower Range (In Mg/Kg): 120 Weight Units: pounds Female Completion Statement: After discussing her treatment course we decided to discontinue isotretinoin therapy at this time. I explained that she would need to continue her birth control methods for at least one month after the last dosage. She should also get a pregnancy test one month after the last dose. She shouldn't donate blood for one month after the last dose. She should call with any new symptoms of depression. Myalgia Treatment: I explained this is common when taking isotretinoin. If this worsens they will contact us. They may try OTC ibuprofen. Cheilitis Aggressive Treatment: I recommended application of Vaseline or Aquaphor numerous times a day (as often as every hour) and before going to bed. I also prescribed a topical steroid for twice daily use. Months Of Therapy Completed: 1 Nosebleeds Normal Treatment: I explained this is common when taking isotretinoin. I recommended saline mist in each nostril multiple times a day. If this worsens they will contact us. Xerosis Normal Treatment: I recommended application of Cetaphil or CeraVe numerous times a day going to bed to all dry areas. Male Completion Statement: After discussing his treatment course we decided to discontinue isotretinoin therapy at this time. He shouldn't donate blood for one month after the last dose. He should call with any new symptoms of depression. Next Month's Dosage: Continue Current Dosage Upper Range (In Mg/Kg): 150 Xerosis Aggressive Treatment: I recommended application of Cetaphil or CeraVe numerous times a day going to bed to all dry areas. I also prescribed a topical steroid for twice daily use. Target Cumulative Dosage (In Mg/Kg): 135 Dosing Month 1 (Required For Cumulative Dosing): 20mg Daily Is Cheilitis Present?: Yes - Normal Treatment Detail Level: Zone What Is The Patient's Gender: Female

## 2024-03-19 ENCOUNTER — MYC MEDICAL ADVICE (OUTPATIENT)
Dept: ENDOCRINOLOGY | Facility: CLINIC | Age: 29
End: 2024-03-19
Payer: COMMERCIAL

## 2024-03-20 ENCOUNTER — OFFICE VISIT (OUTPATIENT)
Dept: ENDOCRINOLOGY | Facility: CLINIC | Age: 29
End: 2024-03-20
Payer: COMMERCIAL

## 2024-03-20 VITALS
RESPIRATION RATE: 18 BRPM | BODY MASS INDEX: 26.82 KG/M2 | HEART RATE: 112 BPM | OXYGEN SATURATION: 98 % | DIASTOLIC BLOOD PRESSURE: 88 MMHG | SYSTOLIC BLOOD PRESSURE: 162 MMHG | WEIGHT: 145 LBS

## 2024-03-20 DIAGNOSIS — N18.1 CHRONIC KIDNEY DISEASE, STAGE 1: ICD-10-CM

## 2024-03-20 DIAGNOSIS — E10.29 TYPE 1 DIABETES MELLITUS WITH MICROALBUMINURIA (H): Primary | ICD-10-CM

## 2024-03-20 DIAGNOSIS — R80.9 TYPE 1 DIABETES MELLITUS WITH MICROALBUMINURIA (H): Primary | ICD-10-CM

## 2024-03-20 DIAGNOSIS — I10 BENIGN ESSENTIAL HYPERTENSION: ICD-10-CM

## 2024-03-20 LAB
ALBUMIN SERPL BCG-MCNC: 4.9 G/DL (ref 3.5–5.2)
ALP SERPL-CCNC: 40 U/L (ref 40–150)
ALT SERPL W P-5'-P-CCNC: 26 U/L (ref 0–50)
ANION GAP SERPL CALCULATED.3IONS-SCNC: 13 MMOL/L (ref 7–15)
AST SERPL W P-5'-P-CCNC: 29 U/L (ref 0–45)
BILIRUB SERPL-MCNC: 0.6 MG/DL
BUN SERPL-MCNC: 7.1 MG/DL (ref 6–20)
CALCIUM SERPL-MCNC: 10.2 MG/DL (ref 8.6–10)
CHLORIDE SERPL-SCNC: 99 MMOL/L (ref 98–107)
CHOLEST SERPL-MCNC: 197 MG/DL
CREAT SERPL-MCNC: 0.65 MG/DL (ref 0.51–0.95)
CREAT UR-MCNC: 59.1 MG/DL
DEPRECATED HCO3 PLAS-SCNC: 23 MMOL/L (ref 22–29)
EGFRCR SERPLBLD CKD-EPI 2021: >90 ML/MIN/1.73M2
FASTING STATUS PATIENT QL REPORTED: YES
GLUCOSE SERPL-MCNC: 140 MG/DL (ref 70–99)
HBA1C MFR BLD: 6.5 %
HCT VFR BLD AUTO: 40.8 % (ref 35–47)
HDLC SERPL-MCNC: 47 MG/DL
LDLC SERPL CALC-MCNC: 113 MG/DL
MICROALBUMIN UR-MCNC: 29.7 MG/L
MICROALBUMIN/CREAT UR: 50.25 MG/G CR (ref 0–25)
NONHDLC SERPL-MCNC: 150 MG/DL
POTASSIUM SERPL-SCNC: 4.4 MMOL/L (ref 3.4–5.3)
PROT SERPL-MCNC: 8.3 G/DL (ref 6.4–8.3)
SODIUM SERPL-SCNC: 135 MMOL/L (ref 135–145)
TRIGL SERPL-MCNC: 187 MG/DL
TSH SERPL DL<=0.005 MIU/L-ACNC: 1.87 UIU/ML (ref 0.3–4.2)

## 2024-03-20 PROCEDURE — 85014 HEMATOCRIT: CPT | Performed by: INTERNAL MEDICINE

## 2024-03-20 PROCEDURE — 84443 ASSAY THYROID STIM HORMONE: CPT | Performed by: INTERNAL MEDICINE

## 2024-03-20 PROCEDURE — 36415 COLL VENOUS BLD VENIPUNCTURE: CPT | Performed by: INTERNAL MEDICINE

## 2024-03-20 PROCEDURE — 80053 COMPREHEN METABOLIC PANEL: CPT | Performed by: INTERNAL MEDICINE

## 2024-03-20 PROCEDURE — 83036 HEMOGLOBIN GLYCOSYLATED A1C: CPT | Performed by: INTERNAL MEDICINE

## 2024-03-20 PROCEDURE — 82570 ASSAY OF URINE CREATININE: CPT | Performed by: INTERNAL MEDICINE

## 2024-03-20 PROCEDURE — 99214 OFFICE O/P EST MOD 30 MIN: CPT | Mod: 25 | Performed by: INTERNAL MEDICINE

## 2024-03-20 PROCEDURE — 82043 UR ALBUMIN QUANTITATIVE: CPT | Performed by: INTERNAL MEDICINE

## 2024-03-20 PROCEDURE — 95251 CONT GLUC MNTR ANALYSIS I&R: CPT | Performed by: INTERNAL MEDICINE

## 2024-03-20 PROCEDURE — 80061 LIPID PANEL: CPT | Performed by: INTERNAL MEDICINE

## 2024-03-20 RX ORDER — LISINOPRIL 20 MG/1
20 TABLET ORAL 2 TIMES DAILY
Qty: 180 TABLET | Refills: 3 | Status: SHIPPED | OUTPATIENT
Start: 2024-03-20

## 2024-03-20 NOTE — NURSING NOTE
Feliciano Sahu's goals for this visit include:   Chief Complaint   Patient presents with    Follow Up     DM       She requests these members of her care team be copied on today's visit information: yes    PCP: Abby Gordon    Referring Provider:  No referring provider defined for this encounter.    BP (!) 162/88 (BP Location: Left arm, Patient Position: Sitting, Cuff Size: Adult Regular)   Pulse 112   Resp 18   Wt 65.8 kg (145 lb)   SpO2 98%   BMI 26.82 kg/m      Do you need any medication refills at today's visit? Yes     Frandy Urbina, EMT

## 2024-03-20 NOTE — PATIENT INSTRUCTIONS
Welcome to the [facility] Endocrinology and Diabetes Clinics     Our Endocrinology Clinics are here to provide you with a team-based, collaborative approach in the diagnosis and treatment of patients with diabetes and endocrine disorders. The team is made up of Physicians, Physician Assistants, Certified Diabetes Educators, Registered Nurses, Medical Assistants, Emergency Medical Technicians, and many others, all of whom have the unified goal of providing our patients with high quality care.     Please see below for some helpful tips to best navigate and use the [facility] Endocrinology clinic:     [place] Respect: At [facility], we are committed to a respectful and safe space for all patients, visitors, and staff.  We believe that mutual respect between patients and their care team is the foundation of quality care.  It is our expectation that you will be treated with respect by your care team.  In turn, we ask that all communication with the care team (written and verbal) be respectful and free from profanity, threatening, or abusive language.  Disrespectful communication undermines our therapeutic relationship with you and may result in us being unable to continue to provide your care.    Refills: A provider must see you at least annually to prescribe and refill medications. This is to ensure your safety as well as meet insurance and compliance regulations.    Scheduling: Many of our Providers offer both in-person or video visits. Please call to schedule any needed follow ups as soon as possible because our provider schedules fill up very quickly. Our care team has the right to require an in-person visit when they believe that it is medically necessary. Please remember that for any virtual visits, you must be in the [facility] at the time of the visit, otherwise we are unable to see you and you will need to be rescheduled.    Missed Appointments: If you need to cancel or miss your  scheduled appointment, please call the clinic at [phone] to reschedule.  Please note if you repeatedly miss appointments or repeatedly miss appointments without calling to inform us ahead of time (no-show), the clinic may elect to not allow you to reschedule without speaking to a manager, may require a Partnership In Care Agreement prior to rescheduling, or could result in you no longer being able to receive care from the clinic. Providing the clinic with timely notification if you have to miss an appointment, allows us to better serve the needs of all of our patients.    Primary Care Provider: Our Endocrinologists are Specialists in their field. We expect you to have a Primary Care Provider established to handle any needs outside of your diabetes and endocrine care.  We would be happy to assist you find a Primary Care Provider, if you do not have one.    [employer]: [employer] is a wonderful resource that allows you access to your Care Team via online or the primo. Please ask a member of the team if you would like help creating an account. Please note that it may take up to 2 business days for a response. [employer] messages are not reviewed on weekends or after business hours.  Emergent or urgent care needs should never be communicated via [employer].  If you experience a medical emergency call 911 or go to the nearest emergency room.    Labs: It is recommended that you stay within the [facility] for labs but you are welcome to obtain ordered labs (with some exceptions) from any location of your choice as long as they are able to complete and process the needed labs. If you need us to fax orders to your preferred lab, please provide us the name and fax number of the lab you would like to go to so we can fax the orders. If your labs are drawn outside of the [facility], please have them fax the results to [phone] ([place]) or [phone] ([place]) or via [facility]. It is your  responsibility to ensure that outside lab results are sent to us.    We look forward to working with you. Please do not hesitate to reach out with any questions.    Thank you,    The Endocrine Team    [facility] Address:   [place] Address:     [address] [place] [address]    Phone: [phone]  Fax: [phone] [address] [place] [address]    Phone: [phone]  Fax: [phone]     [facility] Cost Estimate Phone Number: [phone]    General Lab and Imaging Scheduling Phone Number: [phone]

## 2024-03-20 NOTE — PROGRESS NOTES
=========================================================================================    Assessment     1. Type 1 diabetes with overall suboptimal glycemic control, complicated by mild diabetic retinopathy and microalbuminuria.    Recommendations:  Always bolus prior to food intake, ideally 15 minutes prior  Change the insulin to carbohydrate ratio to 5.5   Have the pump downloaded for my review as needed  Labs today, fasting    2.  Mild goiter on clinical exam  Clinically, the patient is euthyroid.   F/up reflex TSH     3.  Hypertension, uncontrolled  Plan: increase the dose of lisinopril to 20 mg BID   Recommended to check her blood pressure at home and contact us with the numbers.     Orders Placed This Encounter   Procedures    GLUCOSE MONITOR, 72 HOUR, PHYS INTERP    Comprehensive metabolic panel    Lipid panel reflex to direct LDL Fasting    Hematocrit    Albumin Random Urine Quantitative with Creat Ratio    AFINION HEMOGLOBIN A1C POCT    Hemoglobin A1c POCT     =========================================================================================    The patient is seen in f/up for evaluation of diabetes. She was last seen in the clinic in 3/23. The patient prefers to see us on an annual basis.     Feliciano Sahu is a 28 year old female diagnosed with type 1 diabetes in 2008, when she was found to be in DKA. Hemoglobin A1c used to be between 9.3 and 10.9 and it significantly decreased since 2016, to 8-8.5%.   Most recent A1c was 7.4% in March 2023. Her diabetes is known to be complicated by microalbuminuria.    A1c today was 6.5%.     Insulin pump (Tandem 9/2020) settings:  Basal rate at 12 midnight 1.5 units per hour  Insulin to carb ratio 1 U per 6 grams  Sensitivity 30   Sleep activity 10 pm to 5:30 am     I reviewed the insulin pump and sensor records.    On the sensor, average glucose is 146, with a standard deviation of 29 and a coefficient of variation of 20, corresponding to an estimated GMI of  6.8%.  The patient has been using the pump in the control IQ mode 98% of time.  On the sensor, 88% of the glucose numbers are within target of , 0.1% are above 250 and 0.1% are in the mild hypoglycemic range.  Total daily insulin dose is 65 units, of which 55% is basal insulin.  81% of the boluses are manual and 19% are correction.  The cannula is changed every 3.7 days and the cartridge every 2.1 days. She overrides the correction boluses and she takes more insulin, mainly postprandially.     Diabetes complications:  Retinopathy: last eye exam 2/24. Note not available.   Nephropathy: h/o proteinuria dating back to 2010 - gradually improving.  Most recent urine microalbumin positive at 41 in March 23.  Neuropathy: no numbness or tingling sensation   She has been very rarely experiencing lows.   Feels shaky, sweaty and dizzy when BG drops in the 80s. Lowest BG she remembers was 35. She denies prior episodes of loss of consciousness due to hypoglycemia.  She does have glucagon at home.  She had one episode of diabetes ketoacidosis in 1999.  3/23/2023: LDL cholesterol 112, HDL cholesterol 47, triglycerides 135.    Hypertension, currently treated with 10 mg lisinopril BID (might miss the evening dose occasionally) and 75 mg metoprolol succinate daily.  She does have a blood pressure cuff at home but she has not been checking her blood pressure.  On lab work from May 2018, screening for hyperaldosteronism was negative.    Past Medical History:   Diagnosis Date    Diabetes mellitus (H) 01/01/1998    Type 1    Diabetic eye exam (H) 08/09/2013    [facility]    Hypertension    Treated with Depo-Provera since, approximately, 2014    Past Surgical History:   Procedure Laterality Date    EYE SURGERY      lazy eye correction (strabismus?)     Current Medications       Current Outpatient Medications:     blood glucose monitoring (RACHEL CONTOUR NEXT) test strip, Use to test blood sugar 4 times daily or as  "directed., Disp: 100 strip, Rfl: 0    buPROPion (WELLBUTRIN XL) 150 MG 24 hr tablet, TAKE 1 TABLET BY MOUTH IN THE MORNING, Disp: 90 tablet, Rfl: 0    cetirizine (ZYRTEC) 10 MG tablet, Take 1 tablet (10 mg) by mouth daily as needed for allergies, Disp: 90 tablet, Rfl: 1    Continuous Blood Gluc Sensor (DEXCOM G6 SENSOR) MISC, Change every 10 days., Disp: 9 each, Rfl: 3    Continuous Blood Gluc Transmit (DEXCOM G6 TRANSMITTER) MISC, Change every 3 months., Disp: 1 each, Rfl: 3    glucagon (GLUCAGON EMERGENCY) 1 MG kit, Inject 1 mg subcutaneously or intramuscularly., Disp: 1 each, Rfl: 3    insulin aspart (NOVOLOG VIAL) 100 UNITS/ML vial, Take 75 units daily via insulin pump., Disp: 80 mL, Rfl: 3    insulin syringe 31G X 5/16\" 0.5 ML MISC, Use 4-6 times daily in the event of pump failure, Disp: 50 each, Rfl: 1    lisinopril (ZESTRIL) 10 MG tablet, Take 1 tablet (10 mg) by mouth 2 times daily, Disp: 180 tablet, Rfl: 3    metoprolol succinate ER (TOPROL XL) 25 MG 24 hr tablet, Take 3 tablets (75 mg) by mouth daily, Disp: 270 tablet, Rfl: 3    SIMPESSE 0.15-0.03 &0.01 MG tablet, Take 1 tablet by mouth once daily, Disp: 91 tablet, Rfl: 0    Current Facility-Administered Medications:     medroxyPROGESTERone (DEPO-PROVERA) injection 150 mg, 150 mg, Intramuscular, Q90 Days, Cecilia Banks, CLEMENCIA CNP, 150 mg at 11/25/20 1130    Family History   Mother has hypothyroidism. Father has hypercholesterolemia. Grandfather melanoma. No known family history of hypertension, death at a young age, stroke.     Social History  Single. No children. She denies smoking, drinking alcohol or using illicit drugs. Occupation: nurse at [employer].          Vital Signs     Previous Weights:    Wt Readings from Last 10 Encounters:   03/20/24 65.8 kg (145 lb)   03/15/23 69.9 kg (154 lb 3.2 oz)   10/03/22 67.6 kg (149 lb)   02/03/22 65.1 kg (143 lb 8 oz)   12/15/21 63.1 kg (139 lb 3.2 oz)   05/04/21 67.1 kg (148 lb) "   03/25/21 69.1 kg (152 lb 4 oz)   01/06/21 68.9 kg (152 lb)   11/25/20 68.3 kg (150 lb 8 oz)   08/28/20 67.6 kg (149 lb)     Vital Signs:  BP (!) 162/88 (BP Location: Left arm, Patient Position: Sitting, Cuff Size: Adult Regular)   Pulse 112   Resp 18   Wt 65.8 kg (145 lb)   SpO2 98%   BMI 26.82 kg/m      General appearance: she is well-developed, well-nourished, and in no distress.  Eyes: conjunctivae and extraocular motions are normal. Pupils are equal, round, and reactive to light. No lid lag, no stare.  HENT: oropharynx clear and moist; neck no JVD, no bruits, ? mild thyromegaly, no palpable nodules  Cardiovascular: regular rhythm, heart rate in the 90s, no murmurs, distal pulses palpable, no edema  Respiratory: chest clear, no rales, no rhonchi  Musculoskeletal: normal tone and strength   GI: soft, nontender, nondistended, normal BG, no organomegaly   Neurologic: reflexes normal and symmetric, very fine resting tremor of the outstretched hands  Psychiatric: affect and judgment normal   Feet: No lesions, sensation intact to monofilament testing    Lab Results  I reviewed prior lab results documented in Epic.      Lab Results   Component Value Date    A1C 6.8 (H) 03/23/2023    A1C 7.0 (H) 10/19/2021    A1C 7.4 (H) 11/10/2020    A1C 7.4 (A) 07/10/2019    A1C 7.4 07/24/2018    A1C 7.8 01/02/2018    A1C 8.1 07/26/2016    HEMOGLOBINA1 7.4 (A) 03/15/2023       Hemoglobin   Date Value Ref Range Status   09/22/2008 16.0 (H) 11.7 - 15.7 g/dL Final     Hematocrit   Date Value Ref Range Status   03/23/2023 39.6 35.0 - 47.0 % Final   11/10/2020 43.7 35.0 - 47.0 % Final     Cholesterol   Date Value Ref Range Status   03/23/2023 186 <200 mg/dL Final   11/10/2020 152 <200 mg/dL Final     Cholesterol/HDL Ratio   Date Value Ref Range Status   01/03/2013 5.0 0.0 - 5.0 Final     HDL Cholesterol   Date Value Ref Range Status   11/10/2020 46 (L) >49 mg/dL Final     Direct Measure HDL   Date Value Ref Range Status   03/23/2023  47 (L) >=50 mg/dL Final     LDL Cholesterol Calculated   Date Value Ref Range Status   03/23/2023 112 (H) <=100 mg/dL Final   11/10/2020 71 <100 mg/dL Final     Comment:     Desirable:       <100 mg/dl     VLDL-Cholesterol   Date Value Ref Range Status   01/03/2013 31 (H) 0 - 30 mg/dL Final     Triglycerides   Date Value Ref Range Status   03/23/2023 135 <150 mg/dL Final   11/10/2020 177 (H) <150 mg/dL Final     Comment:     Borderline high:  150-199 mg/dl  High:             200-499 mg/dl  Very high:       >499 mg/dl  Fasting specimen       Albumin Urine mg/L   Date Value Ref Range Status   03/23/2023 123.2 mg/L Final     Comment:     The reference ranges have not been established in urine albumin. The results should be integrated into the clinical context for interpretation.   02/17/2022 19 mg/L Final   11/10/2020 104 mg/L Final     TSH   Date Value Ref Range Status   10/19/2021 2.32 0.40 - 4.00 mU/L Final   11/10/2020 3.27 0.40 - 4.00 mU/L Final         Last Basic Metabolic Panel:    Sodium   Date Value Ref Range Status   03/23/2023 134 (L) 136 - 145 mmol/L Final   11/10/2020 137 133 - 144 mmol/L Final     Potassium   Date Value Ref Range Status   03/23/2023 4.8 3.4 - 5.3 mmol/L Final   10/19/2021 4.3 3.4 - 5.3 mmol/L Final   11/10/2020 4.3 3.4 - 5.3 mmol/L Final     Chloride   Date Value Ref Range Status   03/23/2023 101 98 - 107 mmol/L Final   10/19/2021 110 (H) 94 - 109 mmol/L Final   11/10/2020 108 94 - 109 mmol/L Final     Calcium   Date Value Ref Range Status   03/23/2023 9.7 8.6 - 10.0 mg/dL Final   11/10/2020 9.0 8.5 - 10.1 mg/dL Final     Carbon Dioxide   Date Value Ref Range Status   11/10/2020 21 20 - 32 mmol/L Final     Carbon Dioxide (CO2)   Date Value Ref Range Status   03/23/2023 22 22 - 29 mmol/L Final   10/19/2021 25 20 - 32 mmol/L Final     Urea Nitrogen   Date Value Ref Range Status   03/23/2023 11.8 6.0 - 20.0 mg/dL Final   10/19/2021 11 7 - 30 mg/dL Final   11/10/2020 10 7 - 30 mg/dL Final      Creatinine   Date Value Ref Range Status   03/23/2023 0.78 0.51 - 0.95 mg/dL Final   11/10/2020 0.59 0.52 - 1.04 mg/dL Final     GFR Estimate   Date Value Ref Range Status   03/23/2023 >90 >60 mL/min/1.73m2 Final     Comment:     eGFR calculated using 2021 CKD-EPI equation.   11/10/2020 >90 >60 mL/min/[1.73_m2] Final     Comment:     Non  GFR Calc  Starting 12/18/2018, serum creatinine based estimated GFR (eGFR) will be   calculated using the Chronic Kidney Disease Epidemiology Collaboration   (CKD-EPI) equation.       Glucose   Date Value Ref Range Status   03/23/2023 209 (H) 70 - 99 mg/dL Final   10/19/2021 156 (H) 70 - 99 mg/dL Final   11/10/2020 182 (H) 70 - 99 mg/dL Final     Comment:     Fasting specimen       AST   Date Value Ref Range Status   03/23/2023 26 10 - 35 U/L Final   11/10/2020 27 0 - 45 U/L Final     ALT   Date Value Ref Range Status   03/23/2023 31 10 - 35 U/L Final   11/10/2020 53 (H) 0 - 50 U/L Final     Albumin   Date Value Ref Range Status   03/23/2023 4.2 3.5 - 5.2 g/dL Final   10/19/2021 3.8 3.4 - 5.0 g/dL Final   11/10/2020 3.9 3.4 - 5.0 g/dL Final     TSH   Date Value Ref Range Status   10/19/2021 2.32 0.40 - 4.00 mU/L Final   11/10/2020 3.27 0.40 - 4.00 mU/L Final

## 2024-03-20 NOTE — LETTER
3/20/2024         RE: Feliciano Sahu  [id number]  [facility] [id number]        Dear Colleague,    Thank you for referring your patient, Feliciano Sahu, to the [facility]. Please see a copy of my visit note below.          =========================================================================================    Assessment     1. Type 1 diabetes with overall suboptimal glycemic control, complicated by mild diabetic retinopathy and microalbuminuria.    Recommendations:  Always bolus prior to food intake, ideally 15 minutes prior  Change the insulin to carbohydrate ratio to 5.5   Have the pump downloaded for my review as needed  Labs today, fasting    2.  Mild goiter on clinical exam  Clinically, the patient is euthyroid.   F/up reflex TSH     3.  Hypertension, uncontrolled  Plan: increase the dose of lisinopril to 20 mg BID   Recommended to check her blood pressure at home and contact us with the numbers.     Orders Placed This Encounter   Procedures     GLUCOSE MONITOR, 72 HOUR, PHYS INTERP     Comprehensive metabolic panel     Lipid panel reflex to direct LDL Fasting     Hematocrit     Albumin Random Urine Quantitative with Creat Ratio     AFINION HEMOGLOBIN A1C POCT     Hemoglobin A1c POCT     =========================================================================================    The patient is seen in f/up for evaluation of diabetes. She was last seen in the clinic in 3/23. The patient prefers to see us on an annual basis.     Feliciano Sahu is a 28 year old female diagnosed with type 1 diabetes in 2008, when she was found to be in DKA. Hemoglobin A1c used to be between 9.3 and 10.9 and it significantly decreased since 2016, to 8-8.5%.   Most recent A1c was 7.4% in March 2023. Her diabetes is known to be complicated by microalbuminuria.    A1c today was 6.5%.     Insulin pump (Tandem 9/2020) settings:  Basal rate at 12 midnight 1.5 units per hour  Insulin to carb  ratio 1 U per 6 grams  Sensitivity 30   Sleep activity 10 pm to 5:30 am     I reviewed the insulin pump and sensor records.    On the sensor, average glucose is 146, with a standard deviation of 29 and a coefficient of variation of 20, corresponding to an estimated GMI of 6.8%.  The patient has been using the pump in the control IQ mode 98% of time.  On the sensor, 88% of the glucose numbers are within target of , 0.1% are above 250 and 0.1% are in the mild hypoglycemic range.  Total daily insulin dose is 65 units, of which 55% is basal insulin.  81% of the boluses are manual and 19% are correction.  The cannula is changed every 3.7 days and the cartridge every 2.1 days. She overrides the correction boluses and she takes more insulin, mainly postprandially.     Diabetes complications:  Retinopathy: last eye exam 2/24. Note not available.   Nephropathy: h/o proteinuria dating back to 2010 - gradually improving.  Most recent urine microalbumin positive at 41 in March 23.  Neuropathy: no numbness or tingling sensation   She has been very rarely experiencing lows.   Feels shaky, sweaty and dizzy when BG drops in the 80s. Lowest BG she remembers was 35. She denies prior episodes of loss of consciousness due to hypoglycemia.  She does have glucagon at home.  She had one episode of diabetes ketoacidosis in 1999.  3/23/2023: LDL cholesterol 112, HDL cholesterol 47, triglycerides 135.    Hypertension, currently treated with 10 mg lisinopril BID (might miss the evening dose occasionally) and 75 mg metoprolol succinate daily.  She does have a blood pressure cuff at home but she has not been checking her blood pressure.  On lab work from May 2018, screening for hyperaldosteronism was negative.    Past Medical History:   Diagnosis Date     Diabetes mellitus (H) 01/01/1998    Type 1     Diabetic eye exam (H) 08/09/2013    [facility]     Hypertension    Treated with Depo-Provera since, approximately, 2014    Past  "Surgical History:   Procedure Laterality Date     EYE SURGERY      lazy eye correction (strabismus?)     Current Medications       Current Outpatient Medications:      blood glucose monitoring (RACHEL CONTOUR NEXT) test strip, Use to test blood sugar 4 times daily or as directed., Disp: 100 strip, Rfl: 0     buPROPion (WELLBUTRIN XL) 150 MG 24 hr tablet, TAKE 1 TABLET BY MOUTH IN THE MORNING, Disp: 90 tablet, Rfl: 0     cetirizine (ZYRTEC) 10 MG tablet, Take 1 tablet (10 mg) by mouth daily as needed for allergies, Disp: 90 tablet, Rfl: 1     Continuous Blood Gluc Sensor (DEXCOM G6 SENSOR) MISC, Change every 10 days., Disp: 9 each, Rfl: 3     Continuous Blood Gluc Transmit (DEXCOM G6 TRANSMITTER) MISC, Change every 3 months., Disp: 1 each, Rfl: 3     glucagon (GLUCAGON EMERGENCY) 1 MG kit, Inject 1 mg subcutaneously or intramuscularly., Disp: 1 each, Rfl: 3     insulin aspart (NOVOLOG VIAL) 100 UNITS/ML vial, Take 75 units daily via insulin pump., Disp: 80 mL, Rfl: 3     insulin syringe 31G X 5/16\" 0.5 ML MISC, Use 4-6 times daily in the event of pump failure, Disp: 50 each, Rfl: 1     lisinopril (ZESTRIL) 10 MG tablet, Take 1 tablet (10 mg) by mouth 2 times daily, Disp: 180 tablet, Rfl: 3     metoprolol succinate ER (TOPROL XL) 25 MG 24 hr tablet, Take 3 tablets (75 mg) by mouth daily, Disp: 270 tablet, Rfl: 3     SIMPESSE 0.15-0.03 &0.01 MG tablet, Take 1 tablet by mouth once daily, Disp: 91 tablet, Rfl: 0    Current Facility-Administered Medications:      medroxyPROGESTERone (DEPO-PROVERA) injection 150 mg, 150 mg, Intramuscular, Q90 Days, Cecilia Banks APRN CNP, 150 mg at 11/25/20 1130    Family History   Mother has hypothyroidism. Father has hypercholesterolemia. Grandfather melanoma. No known family history of hypertension, death at a young age, stroke.     Social History  Single. No children. She denies smoking, drinking alcohol or using illicit drugs. 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          Vital Signs     Previous Weights:    Wt Readings from Last 10 Encounters:   03/20/24 65.8 kg (145 lb)   03/15/23 69.9 kg (154 lb 3.2 oz)   10/03/22 67.6 kg (149 lb)   02/03/22 65.1 kg (143 lb 8 oz)   12/15/21 63.1 kg (139 lb 3.2 oz)   05/04/21 67.1 kg (148 lb)   03/25/21 69.1 kg (152 lb 4 oz)   01/06/21 68.9 kg (152 lb)   11/25/20 68.3 kg (150 lb 8 oz)   08/28/20 67.6 kg (149 lb)     Vital Signs:  BP (!) 162/88 (BP Location: Left arm, Patient Position: Sitting, Cuff Size: Adult Regular)   Pulse 112   Resp 18   Wt 65.8 kg (145 lb)   SpO2 98%   BMI 26.82 kg/m      General appearance: she is well-developed, well-nourished, and in no distress.  Eyes: conjunctivae and extraocular motions are normal. Pupils are equal, round, and reactive to light. No lid lag, no stare.  HENT: oropharynx clear and moist; neck no JVD, no bruits, ? mild thyromegaly, no palpable nodules  Cardiovascular: regular rhythm, heart rate in the 90s, no murmurs, distal pulses palpable, no edema  Respiratory: chest clear, no rales, no rhonchi  Musculoskeletal: normal tone and strength   GI: soft, nontender, nondistended, normal BG, no organomegaly   Neurologic: reflexes normal and symmetric, very fine resting tremor of the outstretched hands  Psychiatric: affect and judgment normal   Feet: No lesions, sensation intact to monofilament testing    Lab Results  I reviewed prior lab results documented in Epic.      Lab Results   Component Value Date    A1C 6.8 (H) 03/23/2023    A1C 7.0 (H) 10/19/2021    A1C 7.4 (H) 11/10/2020    A1C 7.4 (A) 07/10/2019    A1C 7.4 07/24/2018    A1C 7.8 01/02/2018    A1C 8.1 07/26/2016    HEMOGLOBINA1 7.4 (A) 03/15/2023       Hemoglobin   Date Value Ref Range Status   09/22/2008 16.0 (H) 11.7 - 15.7 g/dL Final     Hematocrit   Date Value Ref Range Status   03/23/2023 39.6 35.0 - 47.0 % Final   11/10/2020 43.7 35.0 - 47.0 % Final     Cholesterol   Date Value Ref Range Status   03/23/2023 186 <200 mg/dL  Final   11/10/2020 152 <200 mg/dL Final     Cholesterol/HDL Ratio   Date Value Ref Range Status   01/03/2013 5.0 0.0 - 5.0 Final     HDL Cholesterol   Date Value Ref Range Status   11/10/2020 46 (L) >49 mg/dL Final     Direct Measure HDL   Date Value Ref Range Status   03/23/2023 47 (L) >=50 mg/dL Final     LDL Cholesterol Calculated   Date Value Ref Range Status   03/23/2023 112 (H) <=100 mg/dL Final   11/10/2020 71 <100 mg/dL Final     Comment:     Desirable:       <100 mg/dl     VLDL-Cholesterol   Date Value Ref Range Status   01/03/2013 31 (H) 0 - 30 mg/dL Final     Triglycerides   Date Value Ref Range Status   03/23/2023 135 <150 mg/dL Final   11/10/2020 177 (H) <150 mg/dL Final     Comment:     Borderline high:  150-199 mg/dl  High:             200-499 mg/dl  Very high:       >499 mg/dl  Fasting specimen       Albumin Urine mg/L   Date Value Ref Range Status   03/23/2023 123.2 mg/L Final     Comment:     The reference ranges have not been established in urine albumin. The results should be integrated into the clinical context for interpretation.   02/17/2022 19 mg/L Final   11/10/2020 104 mg/L Final     TSH   Date Value Ref Range Status   10/19/2021 2.32 0.40 - 4.00 mU/L Final   11/10/2020 3.27 0.40 - 4.00 mU/L Final         Last Basic Metabolic Panel:    Sodium   Date Value Ref Range Status   03/23/2023 134 (L) 136 - 145 mmol/L Final   11/10/2020 137 133 - 144 mmol/L Final     Potassium   Date Value Ref Range Status   03/23/2023 4.8 3.4 - 5.3 mmol/L Final   10/19/2021 4.3 3.4 - 5.3 mmol/L Final   11/10/2020 4.3 3.4 - 5.3 mmol/L Final     Chloride   Date Value Ref Range Status   03/23/2023 101 98 - 107 mmol/L Final   10/19/2021 110 (H) 94 - 109 mmol/L Final   11/10/2020 108 94 - 109 mmol/L Final     Calcium   Date Value Ref Range Status   03/23/2023 9.7 8.6 - 10.0 mg/dL Final   11/10/2020 9.0 8.5 - 10.1 mg/dL Final     Carbon Dioxide   Date Value Ref Range Status   11/10/2020 21 20 - 32 mmol/L Final     Carbon  Dioxide (CO2)   Date Value Ref Range Status   03/23/2023 22 22 - 29 mmol/L Final   10/19/2021 25 20 - 32 mmol/L Final     Urea Nitrogen   Date Value Ref Range Status   03/23/2023 11.8 6.0 - 20.0 mg/dL Final   10/19/2021 11 7 - 30 mg/dL Final   11/10/2020 10 7 - 30 mg/dL Final     Creatinine   Date Value Ref Range Status   03/23/2023 0.78 0.51 - 0.95 mg/dL Final   11/10/2020 0.59 0.52 - 1.04 mg/dL Final     GFR Estimate   Date Value Ref Range Status   03/23/2023 >90 >60 mL/min/1.73m2 Final     Comment:     eGFR calculated using 2021 CKD-EPI equation.   11/10/2020 >90 >60 mL/min/[1.73_m2] Final     Comment:     Non  GFR Calc  Starting 12/18/2018, serum creatinine based estimated GFR (eGFR) will be   calculated using the Chronic Kidney Disease Epidemiology Collaboration   (CKD-EPI) equation.       Glucose   Date Value Ref Range Status   03/23/2023 209 (H) 70 - 99 mg/dL Final   10/19/2021 156 (H) 70 - 99 mg/dL Final   11/10/2020 182 (H) 70 - 99 mg/dL Final     Comment:     Fasting specimen       AST   Date Value Ref Range Status   03/23/2023 26 10 - 35 U/L Final   11/10/2020 27 0 - 45 U/L Final     ALT   Date Value Ref Range Status   03/23/2023 31 10 - 35 U/L Final   11/10/2020 53 (H) 0 - 50 U/L Final     Albumin   Date Value Ref Range Status   03/23/2023 4.2 3.5 - 5.2 g/dL Final   10/19/2021 3.8 3.4 - 5.0 g/dL Final   11/10/2020 3.9 3.4 - 5.0 g/dL Final     TSH   Date Value Ref Range Status   10/19/2021 2.32 0.40 - 4.00 mU/L Final   11/10/2020 3.27 0.40 - 4.00 mU/L Final         Again, thank you for allowing me to participate in the care of your patient.        Sincerely,        Eda De Dios MD

## 2024-03-22 NOTE — RESULT ENCOUNTER NOTE
There is a persistent but stable amount of protein in the urine.  Controlling the blood pressure should help with this.  Please take the lisinopril twice daily and let me know if the blood pressure at home is above 130/80.  The calcium level is mildly elevated.  Have you been taking calcium and vitamin D supplements?  At what dosages?  The cholesterol remains suboptimal but we can continue to monitor for now.

## 2024-03-27 DIAGNOSIS — F41.9 ANXIETY: ICD-10-CM

## 2024-03-27 DIAGNOSIS — Z30.011 ENCOUNTER FOR INITIAL PRESCRIPTION OF CONTRACEPTIVE PILLS: ICD-10-CM

## 2024-03-27 RX ORDER — BUPROPION HYDROCHLORIDE 150 MG/1
150 TABLET ORAL EVERY MORNING
Qty: 90 TABLET | Refills: 1 | Status: SHIPPED | OUTPATIENT
Start: 2024-03-27 | End: 2024-09-24

## 2024-03-27 RX ORDER — LEVONORGESTREL AND ETHINYL ESTRADIOL AND ETHINYL ESTRADIOL 150-30(84)
1 KIT ORAL DAILY
Qty: 84 TABLET | Refills: 1 | Status: SHIPPED | OUTPATIENT
Start: 2024-03-27 | End: 2024-09-24

## 2024-04-02 ENCOUNTER — TELEPHONE (OUTPATIENT)
Dept: FAMILY MEDICINE | Facility: CLINIC | Age: 29
End: 2024-04-02
Payer: COMMERCIAL

## 2024-04-02 NOTE — TELEPHONE ENCOUNTER
LVM with pre-registration's # to update/ verify registration information for upcoming appointment.       ERICK Moreno

## 2024-04-30 ENCOUNTER — TELEPHONE (OUTPATIENT)
Dept: ENDOCRINOLOGY | Facility: CLINIC | Age: 29
End: 2024-04-30
Payer: COMMERCIAL

## 2024-04-30 ENCOUNTER — MYC MEDICAL ADVICE (OUTPATIENT)
Dept: ENDOCRINOLOGY | Facility: CLINIC | Age: 29
End: 2024-04-30
Payer: COMMERCIAL

## 2024-04-30 DIAGNOSIS — E83.52 HYPERCALCEMIA: Primary | ICD-10-CM

## 2024-04-30 NOTE — TELEPHONE ENCOUNTER
"Per Dr. De Dios    \"There is a persistent but stable amount of protein in the urine.  Controlling the blood pressure should help with this.  Please take the lisinopril twice daily and let me know if the blood pressure at home is above 130/80.  The calcium level is mildly elevated.  Have you been taking calcium and vitamin D supplements?  At what dosages?  The cholesterol remains suboptimal but we can continue to monitor for now. \"    Attempted to contact the patient, left a message for her to call back or to respond via [employer].    Rowan Farrar, THONG  Adult Endocrinology  [facility], [place]    "

## 2024-04-30 NOTE — TELEPHONE ENCOUNTER
Patient responded in mychart see mychart encounter dated 4/30/24.    Rowan Farrar CMA  Adult Endocrinology  [facility]

## 2024-05-31 DIAGNOSIS — J32.0 CHRONIC MAXILLARY SINUSITIS: ICD-10-CM

## 2024-06-02 RX ORDER — CETIRIZINE HYDROCHLORIDE 10 MG/1
TABLET, FILM COATED ORAL
Qty: 90 TABLET | Refills: 0 | Status: SHIPPED | OUTPATIENT
Start: 2024-06-02 | End: 2024-09-24

## 2024-07-07 DIAGNOSIS — E10.29 TYPE 1 DIABETES MELLITUS WITH MICROALBUMINURIA (H): ICD-10-CM

## 2024-07-07 DIAGNOSIS — R80.9 TYPE 1 DIABETES MELLITUS WITH MICROALBUMINURIA (H): ICD-10-CM

## 2024-07-09 RX ORDER — PROCHLORPERAZINE 25 MG/1
SUPPOSITORY RECTAL
Qty: 9 EACH | Refills: 2 | Status: SHIPPED | OUTPATIENT
Start: 2024-07-09

## 2024-07-09 NOTE — TELEPHONE ENCOUNTER
LVD:  3/20/2024  [facility]     Eda De Dios MD  Endocrinology, Diabetes, and Metabolism     Refilled per protocol.

## 2024-07-16 ENCOUNTER — TELEPHONE (OUTPATIENT)
Dept: FAMILY MEDICINE | Facility: CLINIC | Age: 29
End: 2024-07-16
Payer: COMMERCIAL

## 2024-07-16 NOTE — TELEPHONE ENCOUNTER
Patient Quality Outreach    Patient is due for the following:   Diabetes -  Diabetic Follow-Up Visit  Hypertension -  Hypertension follow-up visit  Physical Preventive Adult Physical    Next Steps:   Patient has upcoming appointment, these items will be addressed at that time.    Type of outreach:    Chart review performed, no outreach needed.      Questions for provider review:    None           Marlene Zhang CMA

## 2024-09-24 DIAGNOSIS — Z30.011 ENCOUNTER FOR INITIAL PRESCRIPTION OF CONTRACEPTIVE PILLS: ICD-10-CM

## 2024-09-24 DIAGNOSIS — F41.9 ANXIETY: ICD-10-CM

## 2024-09-24 DIAGNOSIS — J32.0 CHRONIC MAXILLARY SINUSITIS: ICD-10-CM

## 2024-09-24 RX ORDER — CETIRIZINE HYDROCHLORIDE 10 MG/1
TABLET, FILM COATED ORAL
Qty: 90 TABLET | Refills: 0 | Status: SHIPPED | OUTPATIENT
Start: 2024-09-24

## 2024-09-24 RX ORDER — LEVONORGESTREL AND ETHINYL ESTRADIOL AND ETHINYL ESTRADIOL 150-30(84)
1 KIT ORAL DAILY
Qty: 91 TABLET | Refills: 0 | Status: SHIPPED | OUTPATIENT
Start: 2024-09-24

## 2024-09-24 RX ORDER — BUPROPION HYDROCHLORIDE 150 MG/1
150 TABLET ORAL EVERY MORNING
Qty: 90 TABLET | Refills: 0 | Status: SHIPPED | OUTPATIENT
Start: 2024-09-24

## 2024-09-25 DIAGNOSIS — E10.29 TYPE 1 DIABETES MELLITUS WITH MICROALBUMINURIA (H): ICD-10-CM

## 2024-09-25 DIAGNOSIS — R80.9 TYPE 1 DIABETES MELLITUS WITH MICROALBUMINURIA (H): ICD-10-CM

## 2024-09-26 RX ORDER — PROCHLORPERAZINE 25 MG/1
SUPPOSITORY RECTAL
Qty: 1 EACH | Refills: 3 | Status: SHIPPED | OUTPATIENT
Start: 2024-09-26

## 2024-09-27 NOTE — TELEPHONE ENCOUNTER
LVD:  3/20/2024  [facility]     Eda De Dios MD  Endocrinology, Diabetes, and Metabolism     Refilled per protocol.

## 2024-10-27 ENCOUNTER — HEALTH MAINTENANCE LETTER (OUTPATIENT)
Age: 29
End: 2024-10-27

## 2024-11-08 SDOH — HEALTH STABILITY: PHYSICAL HEALTH: ON AVERAGE, HOW MANY DAYS PER WEEK DO YOU ENGAGE IN MODERATE TO STRENUOUS EXERCISE (LIKE A BRISK WALK)?: 5 DAYS

## 2024-11-08 SDOH — HEALTH STABILITY: PHYSICAL HEALTH: ON AVERAGE, HOW MANY MINUTES DO YOU ENGAGE IN EXERCISE AT THIS LEVEL?: 40 MIN

## 2024-11-08 ASSESSMENT — ANXIETY QUESTIONNAIRES
GAD7 TOTAL SCORE: 15
2. NOT BEING ABLE TO STOP OR CONTROL WORRYING: NEARLY EVERY DAY
5. BEING SO RESTLESS THAT IT IS HARD TO SIT STILL: SEVERAL DAYS
1. FEELING NERVOUS, ANXIOUS, OR ON EDGE: NEARLY EVERY DAY
GAD7 TOTAL SCORE: 15
GAD7 TOTAL SCORE: 15
6. BECOMING EASILY ANNOYED OR IRRITABLE: SEVERAL DAYS
4. TROUBLE RELAXING: MORE THAN HALF THE DAYS
7. FEELING AFRAID AS IF SOMETHING AWFUL MIGHT HAPPEN: MORE THAN HALF THE DAYS
7. FEELING AFRAID AS IF SOMETHING AWFUL MIGHT HAPPEN: MORE THAN HALF THE DAYS
IF YOU CHECKED OFF ANY PROBLEMS ON THIS QUESTIONNAIRE, HOW DIFFICULT HAVE THESE PROBLEMS MADE IT FOR YOU TO DO YOUR WORK, TAKE CARE OF THINGS AT HOME, OR GET ALONG WITH OTHER PEOPLE: VERY DIFFICULT
3. WORRYING TOO MUCH ABOUT DIFFERENT THINGS: NEARLY EVERY DAY
8. IF YOU CHECKED OFF ANY PROBLEMS, HOW DIFFICULT HAVE THESE MADE IT FOR YOU TO DO YOUR WORK, TAKE CARE OF THINGS AT HOME, OR GET ALONG WITH OTHER PEOPLE?: VERY DIFFICULT

## 2024-11-08 ASSESSMENT — SOCIAL DETERMINANTS OF HEALTH (SDOH): HOW OFTEN DO YOU GET TOGETHER WITH FRIENDS OR RELATIVES?: ONCE A WEEK

## 2024-11-12 ENCOUNTER — OFFICE VISIT (OUTPATIENT)
Dept: FAMILY MEDICINE | Facility: CLINIC | Age: 29
End: 2024-11-12
Payer: COMMERCIAL

## 2024-11-12 VITALS
HEIGHT: 62 IN | WEIGHT: 148.6 LBS | HEART RATE: 80 BPM | SYSTOLIC BLOOD PRESSURE: 132 MMHG | TEMPERATURE: 98.5 F | RESPIRATION RATE: 16 BRPM | DIASTOLIC BLOOD PRESSURE: 78 MMHG | BODY MASS INDEX: 27.34 KG/M2 | OXYGEN SATURATION: 98 %

## 2024-11-12 DIAGNOSIS — Z30.41 ENCOUNTER FOR SURVEILLANCE OF CONTRACEPTIVE PILLS: ICD-10-CM

## 2024-11-12 DIAGNOSIS — Z00.00 ROUTINE GENERAL MEDICAL EXAMINATION AT A HEALTH CARE FACILITY: Primary | ICD-10-CM

## 2024-11-12 DIAGNOSIS — F41.9 ANXIETY: ICD-10-CM

## 2024-11-12 DIAGNOSIS — E83.52 HYPERCALCEMIA: ICD-10-CM

## 2024-11-12 DIAGNOSIS — E10.29 TYPE 1 DIABETES MELLITUS WITH OTHER DIABETIC KIDNEY COMPLICATION (H): ICD-10-CM

## 2024-11-12 LAB
CALCIUM SERPL-MCNC: 9.7 MG/DL (ref 8.8–10.4)
EST. AVERAGE GLUCOSE BLD GHB EST-MCNC: 126 MG/DL
HBA1C MFR BLD: 6 %

## 2024-11-12 PROCEDURE — 90656 IIV3 VACC NO PRSV 0.5 ML IM: CPT | Performed by: PHYSICIAN ASSISTANT

## 2024-11-12 PROCEDURE — 82310 ASSAY OF CALCIUM: CPT | Performed by: PHYSICIAN ASSISTANT

## 2024-11-12 PROCEDURE — 83036 HEMOGLOBIN GLYCOSYLATED A1C: CPT | Performed by: PHYSICIAN ASSISTANT

## 2024-11-12 PROCEDURE — 36415 COLL VENOUS BLD VENIPUNCTURE: CPT | Performed by: PHYSICIAN ASSISTANT

## 2024-11-12 PROCEDURE — 96127 BRIEF EMOTIONAL/BEHAV ASSMT: CPT | Performed by: PHYSICIAN ASSISTANT

## 2024-11-12 PROCEDURE — 99395 PREV VISIT EST AGE 18-39: CPT | Mod: 25 | Performed by: PHYSICIAN ASSISTANT

## 2024-11-12 PROCEDURE — 99214 OFFICE O/P EST MOD 30 MIN: CPT | Mod: 25 | Performed by: PHYSICIAN ASSISTANT

## 2024-11-12 PROCEDURE — 90471 IMMUNIZATION ADMIN: CPT | Performed by: PHYSICIAN ASSISTANT

## 2024-11-12 RX ORDER — LEVONORGESTREL / ETHINYL ESTRADIOL AND ETHINYL ESTRADIOL 150-30(84)
1 KIT ORAL DAILY
Qty: 91 TABLET | Refills: 4 | Status: SHIPPED | OUTPATIENT
Start: 2024-11-12

## 2024-11-12 RX ORDER — BUPROPION HYDROCHLORIDE 300 MG/1
300 TABLET ORAL EVERY MORNING
Qty: 90 TABLET | Refills: 3 | Status: SHIPPED | OUTPATIENT
Start: 2024-11-12

## 2024-11-12 ASSESSMENT — PAIN SCALES - GENERAL: PAINLEVEL_OUTOF10: NO PAIN (0)

## 2024-11-12 NOTE — PROGRESS NOTES
Preventive Care Visit  [facility]  Abby Gordon PA-C, Family Medicine  Nov 12, 2024        Cassi Wiggins is a 29 year old, presenting for the following:  Physical        11/12/2024     7:21 AM   Additional Questions   Roomed by Tati RECINOS         11/12/2024     7:21 AM   Patient Reported Additional Medications   Patient reports taking the following new medications multi vitamin          HPI    No longer working at [employer] - they went through a lot of corporate changes so moved to [facility]. This has been going well. Has good hours with no weekends.     Diabetes continues to be well controlled. Follows with endocrine regarding this. Last A1c under 7. Due for update of this today.     Not really sure if the Wellbutrin is doing as much as it should. She reports just feeling really anxious, stressed, on-edge. Notices days she is more stressed she really does not sleep well at night. Does tolerate this well without side effects.     Health Care Directive  Patient does not have a Health Care Directive: Discussed advance care planning with patient; information given to patient to review.      11/8/2024   General Health   How would you rate your overall physical health? (!) FAIR   Feel stress (tense, anxious, or unable to sleep) Rather much      (!) STRESS CONCERN      11/8/2024   Nutrition   Three or more servings of calcium each day? Yes   Diet: Diabetic   How many servings of fruit and vegetables per day? (!) 2-3   How many sweetened beverages each day? 0-1            11/8/2024   Exercise   Days per week of moderate/strenous exercise 5 days   Average minutes spent exercising at this level 40 min            11/8/2024   Social Factors   Frequency of gathering with friends or relatives Once a week   Worry food won't last until get money to buy more No   Food not last or not have enough money for food? No   Do you have housing? (Housing is defined as stable permanent  housing and does not include staying ouside in a car, in a tent, in an abandoned building, in an overnight shelter, or couch-surfing.) No   Are you worried about losing your housing? No   Lack of transportation? No   Unable to get utilities (heat,electricity)? Yes   Want help with housing or utility concern? No      (!) HOUSING CONCERN PRESENT(!) FINANCIAL RESOURCE STRAIN CONCERN      11/8/2024   Dental   Dentist two times every year? (!) NO                 Today's PHQ-2 Score:       4/8/2024     9:49 AM   PHQ-2 ( 1999 [employer])   Q1: Little interest or pleasure in doing things 1    Q2: Feeling down, depressed or hopeless 1    PHQ-2 Score 2   Q1: Little interest or pleasure in doing things Several days   Q2: Feeling down, depressed or hopeless Several days   PHQ-2 Score 2       Patient-reported         11/8/2024   Substance Use   Alcohol more than 3/day or more than 7/wk No   Do you use any other substances recreationally? No        Social History     Tobacco Use    Smoking status: Never    Smokeless tobacco: Never    Tobacco comments:     no smokers in household   Vaping Use    Vaping status: Never Used   Substance Use Topics    Alcohol use: No    Drug use: No          Mammogram Screening - Patient under 40 years of age: Routine Mammogram Screening not recommended.         11/8/2024   STI Screening   New sexual partner(s) since last STI/HIV test? No        History of abnormal Pap smear: No - age 30-64 HPV with reflex Pap every 5 years recommended        10/3/2022     1:22 PM 2/15/2019     8:29 AM 10/14/2016    12:00 AM   PAP / HPV   PAP Negative for Intraepithelial Lesion or Malignancy (NILM)      PAP (Historical)  NIL  NIL            11/8/2024   Contraception/Family Planning   Questions about contraception or family planning No           Reviewed and updated as needed this visit by Provider                      Review of Systems  Constitutional, HEENT, cardiovascular, pulmonary, GI, , musculoskeletal, neuro, skin,  "endocrine and psych systems are negative, except as otherwise noted.     Objective    Exam  /78 (BP Location: Right arm, Patient Position: Chair)   Pulse 80   Temp 98.5  F (36.9  C) (Temporal)   Resp 16   Ht 1.575 m (5' 2\")   Wt 67.4 kg (148 lb 9.6 oz)   LMP 09/11/2024   SpO2 98%   BMI 27.18 kg/m     Estimated body mass index is 27.18 kg/m  as calculated from the following:    Height as of this encounter: 1.575 m (5' 2\").    Weight as of this encounter: 67.4 kg (148 lb 9.6 oz).    Physical Exam  GENERAL: alert and no distress  EYES: Eyes grossly normal to inspection, PERRL and conjunctivae and sclerae normal  HENT: ear canals and TM's normal, nose and mouth without ulcers or lesions  NECK: no adenopathy, no asymmetry, masses, or scars  RESP: lungs clear to auscultation - no rales, rhonchi or wheezes  CV: regular rate and rhythm, normal S1 S2, no S3 or S4, no murmur, click or rub, no peripheral edema  ABDOMEN: soft, nontender, no hepatosplenomegaly, no masses and bowel sounds normal  MS: no gross musculoskeletal defects noted, no edema  SKIN: no suspicious lesions or rashes  NEURO: Normal strength and tone, mentation intact and speech normal  PSYCH: mentation appears normal, affect normal/bright  Diabetic foot exam: normal DP and PT pulses, no trophic changes or ulcerative lesions, and normal sensory exam      Assessment & Plan     Routine general medical examination at a health care facility    Anxiety  Discussed increasing dose to start however if symptoms still not improving over the next 4-6 weeks then consider addition of an selective serotonin reuptake inhibitor. Patient will update me via [employer].   - buPROPion (WELLBUTRIN XL) 300 MG 24 hr tablet; Take 1 tablet (300 mg) by mouth every morning.    Type 1 diabetes mellitus with other diabetic kidney complication (H)  Stable. Follows with endocrinology. Due for A1c today.   - HEMOGLOBIN A1C; Future  - FOOT EXAM  - HEMOGLOBIN A1C    Encounter for " "surveillance of contraceptive pills  - levonorgest-eth estrad 91-Day (ASHLYNA) 0.15-0.03 &0.01 MG tablet; Take 1 tablet by mouth daily.    Hypercalcemia  - Calcium    Patient has been advised of split billing requirements and indicates understanding: Yes        BMI  Estimated body mass index is 27.18 kg/m  as calculated from the following:    Height as of this encounter: 1.575 m (5' 2\").    Weight as of this encounter: 67.4 kg (148 lb 9.6 oz).   Weight management plan: Discussed healthy diet and exercise guidelines    Counseling  Appropriate preventive services were addressed with this patient via screening, questionnaire, or discussion as appropriate for fall prevention, nutrition, physical activity, Tobacco-use cessation, social engagement, weight loss and cognition.  Checklist reviewing preventive services available has been given to the patient.  Reviewed patient's diet, addressing concerns and/or questions.   The patient was instructed to see the dentist every 6 months.       Signed Electronically by: Abby Gordon PA-C    Answers submitted by the patient for this visit:  Patient Health Questionnaire (G7) (Submitted on 11/8/2024)  SANDIP 7 TOTAL SCORE: 15    "

## 2024-11-12 NOTE — PATIENT INSTRUCTIONS
1:08 PM  SW called [facility] to indicate status of referral. Left voicemail for Beba.     Also called [facility] to indicate status of referral. Left voicemail with house supervisior.     Tonya Hyman LMSW   [facility]  Ext [phone]     Patient Education   Preventive Care Advice   This is general advice given by our system to help you stay healthy. However, your care team may have specific advice just for you. Please talk to your care team about your preventive care needs.  Nutrition  Eat 5 or more servings of fruits and vegetables each day.  Try wheat bread, brown rice and whole grain pasta (instead of white bread, rice, and pasta).  Get enough calcium and vitamin D. Check the label on foods and aim for 100% of the RDA (recommended daily allowance).  Lifestyle  Exercise at least 150 minutes each week  (30 minutes a day, 5 days a week).  Do muscle strengthening activities 2 days a week. These help control your weight and prevent disease.  No smoking.  Wear sunscreen to prevent skin cancer.  Have a dental exam and cleaning every 6 months.  Yearly exams  See your health care team every year to talk about:  Any changes in your health.  Any medicines your care team has prescribed.  Preventive care, family planning, and ways to prevent chronic diseases.  Shots (vaccines)   HPV shots (up to age 26), if you've never had them before.  Hepatitis B shots (up to age 59), if you've never had them before.  COVID-19 shot: Get this shot when it's due.  Flu shot: Get a flu shot every year.  Tetanus shot: Get a tetanus shot every 10 years.  Pneumococcal, hepatitis A, and RSV shots: Ask your care team if you need these based on your risk.  Shingles shot (for age 50 and up)  General health tests  Diabetes screening:  Starting at age 35, Get screened for diabetes at least every 3 years.  If you are younger than age 35, ask your care team if you should be screened for diabetes.  Cholesterol test: At age 39, start having a cholesterol test every 5 years, or more often if advised.  Bone density scan (DEXA): At age 50, ask your care team if you should have this scan for osteoporosis (brittle bones).  Hepatitis C: Get tested at least once in your life.  STIs (sexually  transmitted infections)  Before age 24: Ask your care team if you should be screened for STIs.  After age 24: Get screened for STIs if you're at risk. You are at risk for STIs (including HIV) if:  You are sexually active with more than one person.  You don't use condoms every time.  You or a partner was diagnosed with a sexually transmitted infection.  If you are at risk for HIV, ask about PrEP medicine to prevent HIV.  Get tested for HIV at least once in your life, whether you are at risk for HIV or not.  Cancer screening tests  Cervical cancer screening: If you have a cervix, begin getting regular cervical cancer screening tests starting at age 21.  Breast cancer scan (mammogram): If you've ever had breasts, begin having regular mammograms starting at age 40. This is a scan to check for breast cancer.  Colon cancer screening: It is important to start screening for colon cancer at age 45.  Have a colonoscopy test every 10 years (or more often if you're at risk) Or, ask your provider about stool tests like a FIT test every year or Cologuard test every 3 years.  To learn more about your testing options, visit:   .  For help making a decision, visit:   https://bit.ly/ok54342.  Prostate cancer screening test: If you have a prostate, ask your care team if a prostate cancer screening test (PSA) at age 55 is right for you.  Lung cancer screening: If you are a current or former smoker ages 50 to 80, ask your care team if ongoing lung cancer screenings are right for you.  For informational purposes only. Not to replace the advice of your health care provider. Copyright   2023 [facility]. All rights reserved. Clinically reviewed by the [facility]. JoinMe@ [email or web address] - REV 01/24.  Learning About Stress  What is stress?     Stress is your body's response to a hard situation. Your body can have a physical, emotional, or mental response. Stress is a fact of life for most people, and it  affects everyone differently. What causes stress for you may not be stressful for someone else.  A lot of things can cause stress. You may feel stress when you go on a job interview, take a test, or run a race. This kind of short-term stress is normal and even useful. It can help you if you need to work hard or react quickly. For example, stress can help you finish an important job on time.  Long-term stress is caused by ongoing stressful situations or events. Examples of long-term stress include long-term health problems, ongoing problems at work, or conflicts in your family. Long-term stress can harm your health.  How does stress affect your health?  When you are stressed, your body responds as though you are in danger. It makes hormones that speed up your heart, make you breathe faster, and give you a burst of energy. This is called the fight-or-flight stress response. If the stress is over quickly, your body goes back to normal and no harm is done.  But if stress happens too often or lasts too long, it can have bad effects. Long-term stress can make you more likely to get sick, and it can make symptoms of some diseases worse. If you tense up when you are stressed, you may develop neck, shoulder, or low back pain. Stress is linked to high blood pressure and heart disease.  Stress also harms your emotional health. It can make you sharif, tense, or depressed. Your relationships may suffer, and you may not do well at work or school.  What can you do to manage stress?  You can try these things to help manage stress:   Do something active. Exercise or activity can help reduce stress. Walking is a great way to get started. Even everyday activities such as housecleaning or yard work can help.  Try yoga or alayna chi. These techniques combine exercise and meditation. You may need some training at first to learn them.  Do something you enjoy. For example, listen to music or go to a movie. Practice your hobby or do volunteer  "work.  Meditate. This can help you relax, because you are not worrying about what happened before or what may happen in the future.  Do guided imagery. Imagine yourself in any setting that helps you feel calm. You can use online videos, books, or a teacher to guide you.  Do breathing exercises. For example:  From a standing position, bend forward from the waist with your knees slightly bent. Let your arms dangle close to the floor.  Breathe in slowly and deeply as you return to a standing position. Roll up slowly and lift your head last.  Hold your breath for just a few seconds in the standing position.  Breathe out slowly and bend forward from the waist.  Let your feelings out. Talk, laugh, cry, and express anger when you need to. Talking with supportive friends or family, a counselor, or a randa leader about your feelings is a healthy way to relieve stress. Avoid discussing your feelings with people who make you feel worse.  Write. It may help to write about things that are bothering you. This helps you find out how much stress you feel and what is causing it. When you know this, you can find better ways to cope.  What can you do to prevent stress?  You might try some of these things to help prevent stress:  Manage your time. This helps you find time to do the things you want and need to do.  Get enough sleep. Your body recovers from the stresses of the day while you are sleeping.  Get support. Your family, friends, and community can make a difference in how you experience stress.  Limit your news feed. Avoid or limit time on social media or news that may make you feel stressed.  Do something active. Exercise or activity can help reduce stress. Walking is a great way to get started.  Where can you learn more?  Go to https://www.Fieldbook.net/patiented  Enter N032 in the search box to learn more about \"Learning About Stress.\"  Current as of: October 24, 2023  Content Version: 14.2 2024 [employer]. "   Care instructions adapted under license by your healthcare professional. If you have questions about a medical condition or this instruction, always ask your healthcare professional. [employer] disclaims any warranty or liability for your use of this information.

## 2025-03-01 DIAGNOSIS — I10 BENIGN ESSENTIAL HYPERTENSION: ICD-10-CM

## 2025-03-01 DIAGNOSIS — Z30.41 ENCOUNTER FOR SURVEILLANCE OF CONTRACEPTIVE PILLS: ICD-10-CM

## 2025-03-03 RX ORDER — LEVONORGESTREL / ETHINYL ESTRADIOL AND ETHINYL ESTRADIOL 150-30(84)
1 KIT ORAL DAILY
Qty: 91 TABLET | Refills: 2 | Status: SHIPPED | OUTPATIENT
Start: 2025-03-03

## 2025-03-05 RX ORDER — METOPROLOL SUCCINATE 25 MG/1
75 TABLET, EXTENDED RELEASE ORAL DAILY
Qty: 270 TABLET | Refills: 0 | Status: SHIPPED | OUTPATIENT
Start: 2025-03-05

## 2025-03-05 NOTE — TELEPHONE ENCOUNTER
Last Written Prescription:  metoprolol succinate ER (TOPROL XL) 25 MG 24 hr tablet 270 tablet 3 3/28/2024     ----------------------  Last Visit Date: 3/20/24  Future Visit Date: 3/25/25  ----------------------    Refill decision: Medication refilled per  Medication Refill in Ambulatory Care  policy.  Patricia Rachel RN  [facility] Triage & Med Refill Team       Request from pharmacy:  Requested Prescriptions   Pending Prescriptions Disp Refills    metoprolol succinate ER (TOPROL XL) 25 MG 24 hr tablet [Pharmacy Med Name: Metoprolol Succinate ER 25 MG Oral Tablet Extended Release 24 Hour] 270 tablet 0     Sig: Take 3 tablets by mouth once daily       Beta-Blockers Protocol Passed - 3/5/2025  4:51 PM        Passed - Most recent blood pressure under 140/90 in past 12 months     BP Readings from Last 3 Encounters:   11/12/24 132/78   03/20/24 (!) 162/88   03/15/23 (!) 144/81       No data recorded            Passed - Patient is age 6 or older        Passed - Medication is active on med list and the sig matches. RN to manually verify dose and sig if red X/fail.     If the protocol passes (green check), you do not need to verify med dose and sig.    A prescription matches if they are the same clinical intention.    For Example: once daily and every morning are the same.    The protocol can not identify upper and lower case letters as matching and will fail.     For Example: Take 1 tablet (50 mg) by mouth daily     TAKE 1 TABLET (50 MG) BY MOUTH DAILY    For all fails (red x), verify dose and sig.    If the refill does match what is on file, the RN can still proceed to approve the refill request.       If they do not match, route to the appropriate provider.             Passed - Medication indicated for associated diagnosis     Medication is associated with one or more of the following diagnoses:     Hypertension (HTN)   Atrial fibrillation/flutter   Angina   ASCVD   Migraine   Heart  Failure   Tremor   Anxiety   Ocular hypertension   Glaucoma   PTSD   Obstructive hypertrophic cardiomyopathy   History of myocarditis   Palpitations   POTS (postural orthostatic tachycardia syndrome)   SVT (supraventricular tachycardia)   Hyperthyroidism   Tachycardia   Acute non-st segment elevation myocardial infarction   Subsequent non-st segment elevation myocardial infarction   Ischemic myocardial dysfunction          Passed - Recent (12 mo) or future (90 days) visit within the authorizing provider's specialty     The patient must have completed an in-person or virtual visit within the past 12 months or has a future visit scheduled within the next 90 days with the authorizing provider s specialty.  Urgent care and e-visits do not qualify as an office visit for this protocol.           Signed Prescriptions Disp Refills    levonorgest-eth estrad 91-Day (ASHLYNA) 0.15-0.03 &0.01 MG tablet 91 tablet 2     Sig: Take 1 tablet by mouth daily.       Contraceptives Protocol Passed - 3/5/2025  4:51 PM        Passed - Patient is not a current smoker if age is 35 or older        Passed - Medication is active on med list and the sig matches. RN to manually verify dose and sig if red X/fail.     If the protocol passes (green check), you do not need to verify med dose and sig.    A prescription matches if they are the same clinical intention.    For Example: once daily and every morning are the same.    The protocol can not identify upper and lower case letters as matching and will fail.     For Example: Take 1 tablet (50 mg) by mouth daily     TAKE 1 TABLET (50 MG) BY MOUTH DAILY    For all fails (red x), verify dose and sig.    If the refill does match what is on file, the RN can still proceed to approve the refill request.       If they do not match, route to the appropriate provider.             Passed - Recent (12 mo) or future (90 days) visit within the authorizing provider's specialty     The patient must have  completed an in-person or virtual visit within the past 12 months or has a future visit scheduled within the next 90 days with the authorizing provider s specialty.  Urgent care and e-visits do not qualify as an office visit for this protocol.          Passed - Medication indicated for associated diagnosis     Medication is associated with one or more of the following diagnoses:  Contraception  Acne  Dysmenorrhea  Menorrhagia  Amenorrhea  PCOS  Premenstrual Dysphoric Disorder  Irregular menses  Endometriosis  Contraceptive counseling  Finding of menstrual bleeding  Education about oral contraception  Uses contraception  Initial prescription of oral contraception  Oral contraception-no problem  Oral contraceptive repeat          Passed - No active pregnancy on record        Passed - No positive pregnancy test in past 12 months

## 2025-03-21 ENCOUNTER — MYC MEDICAL ADVICE (OUTPATIENT)
Dept: ENDOCRINOLOGY | Facility: CLINIC | Age: 30
End: 2025-03-21
Payer: COMMERCIAL

## 2025-03-25 ENCOUNTER — OFFICE VISIT (OUTPATIENT)
Dept: ENDOCRINOLOGY | Facility: CLINIC | Age: 30
End: 2025-03-25
Payer: COMMERCIAL

## 2025-03-25 VITALS
OXYGEN SATURATION: 98 % | DIASTOLIC BLOOD PRESSURE: 85 MMHG | WEIGHT: 148.5 LBS | HEART RATE: 110 BPM | BODY MASS INDEX: 27.16 KG/M2 | SYSTOLIC BLOOD PRESSURE: 146 MMHG | RESPIRATION RATE: 18 BRPM

## 2025-03-25 DIAGNOSIS — E10.9 TYPE 1 DIABETES MELLITUS NOT AT GOAL (H): ICD-10-CM

## 2025-03-25 DIAGNOSIS — I10 BENIGN ESSENTIAL HYPERTENSION: Primary | ICD-10-CM

## 2025-03-25 DIAGNOSIS — R00.0 TACHYCARDIA: ICD-10-CM

## 2025-03-25 DIAGNOSIS — E04.9 GOITER: ICD-10-CM

## 2025-03-25 DIAGNOSIS — N18.1 CHRONIC KIDNEY DISEASE, STAGE 1: ICD-10-CM

## 2025-03-25 LAB
ALBUMIN SERPL BCG-MCNC: 4.6 G/DL (ref 3.5–5.2)
ALP SERPL-CCNC: 42 U/L (ref 40–150)
ALT SERPL W P-5'-P-CCNC: 24 U/L (ref 0–50)
ANION GAP SERPL CALCULATED.3IONS-SCNC: 15 MMOL/L (ref 7–15)
AST SERPL W P-5'-P-CCNC: 26 U/L (ref 0–45)
BILIRUB SERPL-MCNC: 0.4 MG/DL
BUN SERPL-MCNC: 7.9 MG/DL (ref 6–20)
CALCIUM SERPL-MCNC: 9.8 MG/DL (ref 8.8–10.4)
CHLORIDE SERPL-SCNC: 98 MMOL/L (ref 98–107)
CHOLEST SERPL-MCNC: 199 MG/DL
CREAT SERPL-MCNC: 0.53 MG/DL (ref 0.51–0.95)
CREAT UR-MCNC: 22.5 MG/DL
EGFRCR SERPLBLD CKD-EPI 2021: >90 ML/MIN/1.73M2
EST. AVERAGE GLUCOSE BLD GHB EST-MCNC: 134 MG/DL
EST. AVERAGE GLUCOSE BLD GHB EST-MCNC: 134 MG/DL
FASTING STATUS PATIENT QL REPORTED: YES
FASTING STATUS PATIENT QL REPORTED: YES
GLUCOSE SERPL-MCNC: 166 MG/DL (ref 70–99)
HBA1C MFR BLD: 6.3 %
HBA1C MFR BLD: 6.3 % (ref 0–5.6)
HCO3 SERPL-SCNC: 19 MMOL/L (ref 22–29)
HCT VFR BLD AUTO: 38.8 % (ref 35–47)
HDLC SERPL-MCNC: 46 MG/DL
LDLC SERPL CALC-MCNC: 107 MG/DL
MICROALBUMIN UR-MCNC: 31.1 MG/L
MICROALBUMIN/CREAT UR: 138.22 MG/G CR (ref 0–25)
NONHDLC SERPL-MCNC: 153 MG/DL
POTASSIUM SERPL-SCNC: 4.6 MMOL/L (ref 3.4–5.3)
PROT SERPL-MCNC: 8.1 G/DL (ref 6.4–8.3)
SODIUM SERPL-SCNC: 132 MMOL/L (ref 135–145)
T4 FREE SERPL-MCNC: 1.27 NG/DL (ref 0.9–1.7)
TRIGL SERPL-MCNC: 228 MG/DL
TSH SERPL DL<=0.005 MIU/L-ACNC: 1.62 UIU/ML (ref 0.3–4.2)

## 2025-03-25 PROCEDURE — 3077F SYST BP >= 140 MM HG: CPT | Performed by: INTERNAL MEDICINE

## 2025-03-25 PROCEDURE — 83036 HEMOGLOBIN GLYCOSYLATED A1C: CPT | Performed by: INTERNAL MEDICINE

## 2025-03-25 PROCEDURE — 82570 ASSAY OF URINE CREATININE: CPT | Performed by: INTERNAL MEDICINE

## 2025-03-25 PROCEDURE — 36415 COLL VENOUS BLD VENIPUNCTURE: CPT | Performed by: INTERNAL MEDICINE

## 2025-03-25 PROCEDURE — 84443 ASSAY THYROID STIM HORMONE: CPT | Performed by: INTERNAL MEDICINE

## 2025-03-25 PROCEDURE — 84439 ASSAY OF FREE THYROXINE: CPT | Performed by: INTERNAL MEDICINE

## 2025-03-25 PROCEDURE — 82043 UR ALBUMIN QUANTITATIVE: CPT | Performed by: INTERNAL MEDICINE

## 2025-03-25 PROCEDURE — 99215 OFFICE O/P EST HI 40 MIN: CPT | Performed by: INTERNAL MEDICINE

## 2025-03-25 PROCEDURE — 3079F DIAST BP 80-89 MM HG: CPT | Performed by: INTERNAL MEDICINE

## 2025-03-25 PROCEDURE — 80053 COMPREHEN METABOLIC PANEL: CPT | Performed by: INTERNAL MEDICINE

## 2025-03-25 PROCEDURE — G2211 COMPLEX E/M VISIT ADD ON: HCPCS | Performed by: INTERNAL MEDICINE

## 2025-03-25 PROCEDURE — 80061 LIPID PANEL: CPT | Performed by: INTERNAL MEDICINE

## 2025-03-25 PROCEDURE — 85014 HEMATOCRIT: CPT | Performed by: INTERNAL MEDICINE

## 2025-03-25 RX ORDER — METOPROLOL SUCCINATE 100 MG/1
100 TABLET, EXTENDED RELEASE ORAL DAILY
Qty: 90 TABLET | Refills: 3 | Status: SHIPPED | OUTPATIENT
Start: 2025-03-25

## 2025-03-25 RX ORDER — INSULIN ASPART 100 [IU]/ML
INJECTION, SOLUTION INTRAVENOUS; SUBCUTANEOUS
Qty: 80 ML | Refills: 3 | Status: SHIPPED | OUTPATIENT
Start: 2025-03-25

## 2025-03-25 NOTE — PATIENT INSTRUCTIONS
Welcome to the [facility] Endocrinology and Diabetes Clinics     Our Endocrinology Clinics are here to provide you with a team-based, collaborative approach in the diagnosis and treatment of patients with diabetes and endocrine disorders. The team is made up of Physicians, Physician Assistants, Certified Diabetes Educators, Registered Nurses, Medical Assistants, Emergency Medical Technicians, and many others, all of whom have the unified goal of providing our patients with high quality care.     Please see below for some helpful tips to best navigate and use the [facility] Endocrinology clinic:     [place] Respect: At [facility], we are committed to a respectful and safe space for all patients, visitors, and staff.  We believe that mutual respect between patients and their care team is the foundation of quality care.  It is our expectation that you will be treated with respect by your care team.  In turn, we ask that all communication with the care team (written and verbal) be respectful and free from profanity, threatening, or abusive language.  Disrespectful communication undermines our therapeutic relationship with you and may result in us being unable to continue to provide your care.    Refills: A provider must see you at least annually to prescribe and refill medications. This is to ensure your safety as well as meet insurance and compliance regulations.    Scheduling: Many of our Providers offer both in-person or video visits. Please call to schedule any needed follow ups as soon as possible because our provider schedules fill up very quickly. Our care team has the right to require an in-person visit when they believe that it is medically necessary. Please remember that for any virtual visits, you must be in the [facility] at the time of the visit, otherwise we are unable to see you and you will need to be rescheduled.    Missed Appointments: If you need to cancel or miss your  scheduled appointment, please call the clinic at [phone] to reschedule.  Please note if you repeatedly miss appointments or repeatedly miss appointments without calling to inform us ahead of time (no-show), the clinic may elect to not allow you to reschedule without speaking to a manager, may require a Partnership In Care Agreement prior to rescheduling, or could result in you no longer being able to receive care from the clinic. Providing the clinic with timely notification if you have to miss an appointment, allows us to better serve the needs of all of our patients.    Primary Care Provider: Our Endocrinologists are Specialists in their field. We expect you to have a Primary Care Provider established to handle any needs outside of your diabetes and endocrine care.  We would be happy to assist you find a Primary Care Provider, if you do not have one.    Authy: Authy is a wonderful resource that allows you access to your Care Team via online or the primo. Please ask a member of the team if you would like help creating an account. Please note that it may take up to 2 business days for a response. Authy messages are not reviewed on weekends or after business hours.  Emergent or urgent care needs should never be communicated via Authy.  If you experience a medical emergency call 911 or go to the nearest emergency room.    Labs: It is recommended that you stay within the [facility] for labs but you are welcome to obtain ordered labs (with some exceptions) from any location of your choice as long as they are able to complete and process the needed labs. If you need us to fax orders to your preferred lab, please provide us the name and fax number of the lab you would like to go to so we can fax the orders. If your labs are drawn outside of the [facility], please have them fax the results to [phone] ([place]) or [phone] ([place]) or via [facility]. It is your  responsibility to ensure that outside lab results are sent to us.    We look forward to working with you. Please do not hesitate to reach out with any questions.    Thank you,    The Endocrine Team    [facility] Address:   [place] Address:     [place] [place] [address]    Phone: [phone]  Fax: [phone]   [address] [place] [address]    Phone: [phone]  Fax: [phone]     [place] Cost Estimate Phone Number: [phone]    General Lab and Imaging Scheduling Phone Number: [phone]      If you are interested in exploring research opportunities in the Division of Diabetes, Endocrinology and Metabolism and within the [facility] you are welcome to view the following QR codes by scanning them using your phone's camera to access a link to more information.  Participating in a research study is voluntary. Your decision whether or not to participate will not affect your current or future relations with the [facility] or [facility]. Current available studies are:     Type 1 Diabetes  Adults     Pediatrics     Type 2 Diabetes  Weight Loss - People Treated with Diet or Metformin Only     Pediatrics and Young Adults

## 2025-03-25 NOTE — PROGRESS NOTES
Assessment      1. Type 1 diabetes  complicated by mild diabetic retinopathy and microalbuminuria.    Recommendations:  Always bolus prior to food intake, ideally 15 minutes prior  Change the insulin to carbohydrate ratio to 5  Change correction factor from 1:30 to 1:24  Change settings to every 3 days   Have the pump downloaded fo review as needed    2.  Mild goiter on clinical exam  Clinically, the patient is euthyroid.   F/up reflex TSH      3. Hypertension : Blood pressure remains elevated this visit, in the 150's  currently treated with 20 mg lisinopril BID  and 75 mg metoprolol succinate daily.  She does have a blood pressure cuff at home but she has not been checking her blood pressure.  On lab work from May 2018, screening for hyperaldosteronism was negative.   - Monitor blood pressure closely at home  - Increase Metoprolol to 100 mg daily  - Continue Lisinopril 20 mg BID  - Obtain basic set of labs including BMP  - Will consider adding a MRA if blood pressure remains elevated given additional benefit of proteinuria    Tachycardia : Has a long standing history of elevated heart rate. Heart rates in the 110's on today visit. States heart rate usually in the 90's at home even while at rest. On Metoprolol 75 mg daily.  Unclear etiology of tachycardia. Metanephrines levels in the past WNL  - Cardiology referral  - Increase Metoprolol to 100 mg daily         Orders Placed This Encounter   Procedures    GLUCOSE MONITOR, 72 HOUR, PHYS INTERP    Comprehensive metabolic panel    Lipid panel reflex to direct LDL Fasting    Hematocrit    Albumin Random Urine Quantitative with Creat Ratio    AFINION HEMOGLOBIN A1C POCT    Hemoglobin A1c POCT      =========================================================================================     The patient is seen in f/up for evaluation of diabetes. She was last seen in the clinic in 3/24. The patient prefers to see us on an annual basis.      Feliciano Sahu is a 28 year old  female diagnosed with type 1 diabetes in 2008, when she was found to be in DKA. Hemoglobin A1c used to be between 9.3 and 10.9 and it significantly decreased since 2016, to 8-8.5%.   Most recent A1c was 6.3 % in March 2025. Her diabetes is known to be complicated by microalbuminuria.     Insulin pump (Tandem 9/2020) settings:  Basal rate at 12 midnight 1.5 units per hour  Insulin to carb ratio 1 U per 5.5 grams  Sensitivity 30   Sleep activity 10 pm to 5:30 am      I reviewed the insulin pump and sensor records.    On the sensor, average glucose is 147, with a standard deviation of 35 and a coefficient of variation of 24 , corresponding to an estimated GMI of 6.8%.  The patient has been using the pump in the control IQ mode 97% of time.  On the sensor, 83% of the glucose numbers are within target of , 0.3% are above 250 and 0.3% are in the mild hypoglycemic range.  Total daily insulin dose is 68 units, of which 52% is basal insulin. 75 % of the boluses are manual and 25 % are correction.  The cannula is changed every 3.7 days and the cartridge every 2.2 days. She overrides the correction boluses and she takes more insulin, mainly postprandially.      Diabetes complications:  Retinopathy: last eye exam 2/24. Note not available.   Nephropathy: h/o proteinuria dating back to 2010 - gradually improving.  Most recent urine microalbumin positive at 50.25 in March 24.  Neuropathy: no numbness or tingling sensation   She has been very rarely experiencing lows.   Feels shaky, sweaty and dizzy when BG drops in the 80s. She denies prior episodes of loss of consciousness due to hypoglycemia.  She does have glucagon at home.  She had one episode of diabetes ketoacidosis in 1999.  3/23/2024: LDL cholesterol 113         Past Medical History:       Past Medical History:   Diagnosis Date    Diabetes mellitus (H) 01/01/1998    Type 1    Diabetic eye exam (H) 08/09/2013    [facility]    Hypertension              Past  Surgical History:      Past Surgical History:   Procedure Laterality Date    EYE SURGERY      lazy eye correction (strabismus?)             Social History:      Social History     Tobacco Use    Smoking status: Never    Smokeless tobacco: Never    Tobacco comments:     no smokers in household   Substance Use Topics    Alcohol use: No        History   Sexual Activity    Sexual activity: Yes    Partners: Male    Birth control/ protection: Pill             Family History:    Family History of Diabetes: Mother has hypothyroidism. Father has hypercholesterolemia. Grandfather melanoma. No known family history of hypertension, death at a young age, stroke.        Family History   Problem Relation Age of Onset    Allergies Mother         seasonal    Thyroid Disease Mother         hypothyridism    Lipids Father     Hyperlipidemia Brother     Cancer Paternal Grandfather         skin c/a    Asthma No family hx of     C.A.D. No family hx of     Diabetes No family hx of     Hypertension No family hx of     Cerebrovascular Disease No family hx of     Breast Cancer No family hx of     Cancer - colorectal No family hx of     Prostate Cancer No family hx of     Alcohol/Drug No family hx of     Alzheimer Disease No family hx of     Anesthesia Reaction No family hx of     Arthritis No family hx of     Blood Disease No family hx of     Cardiovascular No family hx of     Circulatory No family hx of     Congenital Anomalies No family hx of     Connective Tissue Disorder No family hx of     Depression No family hx of     Eye Disorder No family hx of     Genetic Disorder No family hx of     Gastrointestinal Disease No family hx of     Genitourinary Problems No family hx of     Gynecology No family hx of     Heart Disease No family hx of     Musculoskeletal Disorder No family hx of     Neurologic Disorder No family hx of     Obesity No family hx of     Osteoporosis No family hx of     Psychotic Disorder No family hx of     Respiratory No  family hx of     Hearing Loss No family hx of       General appearance: she is well-developed, well-nourished, and in no distress.  Eyes: conjunctivae and extraocular motions are normal. Pupils are equal, round, and reactive to light. No lid lag, no stare.  HENT: oropharynx clear and moist; neck no JVD, no bruits, mild thyromegaly, no palpable nodules  Cardiovascular: regular rhythm, tachycardic , no murmurs, distal pulses palpable, no edema  Respiratory: chest clear, no rales, no rhonchi  GI: soft, nontender, nondistended,   Feet: No lesions, sensation intact to monofilament testing            Laboratory Data:      Lab Results   Component Value Date    A1C 6.3 (H) 03/25/2025    A1C 6.0 (H) 11/12/2024    A1C 6.5 (H) 03/20/2024    A1C 6.8 (H) 03/23/2023    A1C 7.0 (H) 10/19/2021    A1C 7.4 (H) 11/10/2020    A1C 7.4 (A) 07/10/2019    A1C 7.4 07/24/2018    A1C 7.8 01/02/2018    A1C 8.1 07/26/2016     Recent Labs   Lab Test 03/20/24  1043   HCT 40.8     Recent Labs   Lab Test 11/12/24  0757 03/20/24  1043 03/23/23  0816   NA  --  135 134*   POTASSIUM  --  4.4 4.8   CHLORIDE  --  99 101   CO2  --  23 22   ANIONGAP  --  13 11   GLC  --  140* 209*   BUN  --  7.1 11.8   CR  --  0.65 0.78   RYAN 9.7 10.2* 9.7     Recent Labs   Lab Test 03/20/24  1043   PROTTOTAL 8.3   ALBUMIN 4.9   BILITOTAL 0.6   ALKPHOS 40   AST 29   ALT 26       I, Eda De Dios, was present with the resident who participated in the service and in the documentation of the note.  I have verified the history and personally performed the physical exam and medical decision making.  I agree with the assessment and plan of care as documented in the note.     Eda De Dios MD    42 minutes spent on the date of the encounter doing chart review, history and exam, documentation and further activities as noted above.  The longitudinal plan of care for the diagnosis(es)/condition(s) as documented were addressed during this visit. Due to the added complexity in  care, I will continue to support Feliciano in the subsequent management and with ongoing continuity of care.

## 2025-03-25 NOTE — NURSING NOTE
Feliciano Sahu's goals for this visit include:   Chief Complaint   Patient presents with    Follow Up    Diabetes       She requests these members of her care team be copied on today's visit information: yes    PCP: Abby Gordon    Referring Provider:  Referred Self, MD  No address on file    BP (!) 153/90   Pulse 110   Resp 18   Wt 67.4 kg (148 lb 8 oz)   SpO2 98%   BMI 27.16 kg/m      Do you need any medication refills at today's visit? Yes    Frandy Urbina, EMT

## 2025-03-25 NOTE — LETTER
3/25/2025      Feliciano Sahu  [address]  [facility] [address]      Dear Colleague,    Thank you for referring your patient, Feliciano Sahu, to the [facility]. Please see a copy of my visit note below.          Assessment      1. Type 1 diabetes  complicated by mild diabetic retinopathy and microalbuminuria.    Recommendations:  Always bolus prior to food intake, ideally 15 minutes prior  Change the insulin to carbohydrate ratio to 5  Change correction factor from 1:30 to 1:24  Change settings to every 3 days   Have the pump downloaded fo review as needed    2.  Mild goiter on clinical exam  Clinically, the patient is euthyroid.   F/up reflex TSH      3. Hypertension : Blood pressure remains elevated this visit, in the 150's  currently treated with 20 mg lisinopril BID  and 75 mg metoprolol succinate daily.  She does have a blood pressure cuff at home but she has not been checking her blood pressure.  On lab work from May 2018, screening for hyperaldosteronism was negative.   - Monitor blood pressure closely at home  - Increase Metoprolol to 100 mg daily  - Continue Lisinopril 20 mg BID  - Obtain basic set of labs including BMP  - Will consider adding a MRA if blood pressure remains elevated given additional benefit of proteinuria    Tachycardia : Has a long standing history of elevated heart rate. Heart rates in the 110's on today visit. States heart rate usually in the 90's at home even while at rest. On Metoprolol 75 mg daily.  Unclear etiology of tachycardia. Metanephrines levels in the past WNL  - Cardiology referral  - Increase Metoprolol to 100 mg daily         Orders Placed This Encounter   Procedures     GLUCOSE MONITOR, 72 HOUR, PHYS INTERP     Comprehensive metabolic panel     Lipid panel reflex to direct LDL Fasting     Hematocrit     Albumin Random Urine Quantitative with Creat Ratio     AFINION HEMOGLOBIN A1C POCT     Hemoglobin A1c POCT       =========================================================================================     The patient is seen in / for evaluation of diabetes. She was last seen in the clinic in 3/24. The patient prefers to see us on an annual basis.      Feliciano Sahu is a 28 year old female diagnosed with type 1 diabetes in 2008, when she was found to be in DKA. Hemoglobin A1c used to be between 9.3 and 10.9 and it significantly decreased since 2016, to 8-8.5%.   Most recent A1c was 6.3 % in March 2025. Her diabetes is known to be complicated by microalbuminuria.     Insulin pump (Tandem 9/2020) settings:  Basal rate at 12 midnight 1.5 units per hour  Insulin to carb ratio 1 U per 5.5 grams  Sensitivity 30   Sleep activity 10 pm to 5:30 am      I reviewed the insulin pump and sensor records.    On the sensor, average glucose is 147, with a standard deviation of 35 and a coefficient of variation of 24 , corresponding to an estimated GMI of 6.8%.  The patient has been using the pump in the control IQ mode 97% of time.  On the sensor, 83% of the glucose numbers are within target of , 0.3% are above 250 and 0.3% are in the mild hypoglycemic range.  Total daily insulin dose is 68 units, of which 52% is basal insulin. 75 % of the boluses are manual and 25 % are correction.  The cannula is changed every 3.7 days and the cartridge every 2.2 days. She overrides the correction boluses and she takes more insulin, mainly postprandially.      Diabetes complications:  Retinopathy: last eye exam 2/24. Note not available.   Nephropathy: h/o proteinuria dating back to 2010 - gradually improving.  Most recent urine microalbumin positive at 50.25 in March 24.  Neuropathy: no numbness or tingling sensation   She has been very rarely experiencing lows.   Feels shaky, sweaty and dizzy when BG drops in the 80s. She denies prior episodes of loss of consciousness due to hypoglycemia.  She does have glucagon at home.  She had one episode of  diabetes ketoacidosis in 1999.  3/23/2024: LDL cholesterol 113         Past Medical History:       Past Medical History:   Diagnosis Date     Diabetes mellitus (H) 01/01/1998    Type 1     Diabetic eye exam (H) 08/09/2013    [facility]     Hypertension              Past Surgical History:      Past Surgical History:   Procedure Laterality Date     EYE SURGERY      lazy eye correction (strabismus?)             Social History:      Social History     Tobacco Use     Smoking status: Never     Smokeless tobacco: Never     Tobacco comments:     no smokers in household   Substance Use Topics     Alcohol use: No        History   Sexual Activity     Sexual activity: Yes     Partners: Male     Birth control/ protection: Pill             Family History:    Family History of Diabetes: Mother has hypothyroidism. Father has hypercholesterolemia. Grandfather melanoma. No known family history of hypertension, death at a young age, stroke.        Family History   Problem Relation Age of Onset     Allergies Mother         seasonal     Thyroid Disease Mother         hypothyridism     Lipids Father      Hyperlipidemia Brother      Cancer Paternal Grandfather         skin c/a     Asthma No family hx of      C.A.D. No family hx of      Diabetes No family hx of      Hypertension No family hx of      Cerebrovascular Disease No family hx of      Breast Cancer No family hx of      Cancer - colorectal No family hx of      Prostate Cancer No family hx of      Alcohol/Drug No family hx of      Alzheimer Disease No family hx of      Anesthesia Reaction No family hx of      Arthritis No family hx of      Blood Disease No family hx of      Cardiovascular No family hx of      Circulatory No family hx of      Congenital Anomalies No family hx of      Connective Tissue Disorder No family hx of      Depression No family hx of      Eye Disorder No family hx of      Genetic Disorder No family hx of      Gastrointestinal Disease No family hx of       Genitourinary Problems No family hx of      Gynecology No family hx of      Heart Disease No family hx of      Musculoskeletal Disorder No family hx of      Neurologic Disorder No family hx of      Obesity No family hx of      Osteoporosis No family hx of      Psychotic Disorder No family hx of      Respiratory No family hx of      Hearing Loss No family hx of       General appearance: she is well-developed, well-nourished, and in no distress.  Eyes: conjunctivae and extraocular motions are normal. Pupils are equal, round, and reactive to light. No lid lag, no stare.  HENT: oropharynx clear and moist; neck no JVD, no bruits, mild thyromegaly, no palpable nodules  Cardiovascular: regular rhythm, tachycardic , no murmurs, distal pulses palpable, no edema  Respiratory: chest clear, no rales, no rhonchi  GI: soft, nontender, nondistended,   Feet: No lesions, sensation intact to monofilament testing            Laboratory Data:      Lab Results   Component Value Date    A1C 6.3 (H) 03/25/2025    A1C 6.0 (H) 11/12/2024    A1C 6.5 (H) 03/20/2024    A1C 6.8 (H) 03/23/2023    A1C 7.0 (H) 10/19/2021    A1C 7.4 (H) 11/10/2020    A1C 7.4 (A) 07/10/2019    A1C 7.4 07/24/2018    A1C 7.8 01/02/2018    A1C 8.1 07/26/2016     Recent Labs   Lab Test 03/20/24  1043   HCT 40.8     Recent Labs   Lab Test 11/12/24  0757 03/20/24  1043 03/23/23  0816   NA  --  135 134*   POTASSIUM  --  4.4 4.8   CHLORIDE  --  99 101   CO2  --  23 22   ANIONGAP  --  13 11   GLC  --  140* 209*   BUN  --  7.1 11.8   CR  --  0.65 0.78   RYAN 9.7 10.2* 9.7     Recent Labs   Lab Test 03/20/24  1043   PROTTOTAL 8.3   ALBUMIN 4.9   BILITOTAL 0.6   ALKPHOS 40   AST 29   ALT 26       I, Eda De Dios, was present with the resident who participated in the service and in the documentation of the note.  I have verified the history and personally performed the physical exam and medical decision making.  I agree with the assessment and plan of care as documented  in the note.     Eda De Dios MD    42 minutes spent on the date of the encounter doing chart review, history and exam, documentation and further activities as noted above.  The longitudinal plan of care for the diagnosis(es)/condition(s) as documented were addressed during this visit. Due to the added complexity in care, I will continue to support Feliciano in the subsequent management and with ongoing continuity of care.        Again, thank you for allowing me to participate in the care of your patient.        Sincerely,        Eda De Dios MD    Electronically signed

## 2025-03-26 NOTE — RESULT ENCOUNTER NOTE
The sodium level was slightly lowish on your lab work.  In a regular day, how much liquids are you drinking?  The cholesterol is mildly elevated but we can continue to monitor it for now.  The triglycerides are also elevated and, in general, low carbohydrate low-fat diet and physical exercise should help with this.  If they remain consistently elevated at your next visit, we are going to discuss about the option of starting you on a cholesterol-lowering medication.  The other labs are normal.

## 2025-04-03 ENCOUNTER — TELEPHONE (OUTPATIENT)
Dept: ENDOCRINOLOGY | Facility: CLINIC | Age: 30
End: 2025-04-03
Payer: COMMERCIAL

## 2025-04-03 NOTE — TELEPHONE ENCOUNTER
Called pt and LVM stating I was calling with lab results from provider and will also send her a [employer] message with the instructions.    ESVIN Carias  [facility]

## 2025-04-08 DIAGNOSIS — R80.9 TYPE 1 DIABETES MELLITUS WITH MICROALBUMINURIA (H): ICD-10-CM

## 2025-04-08 DIAGNOSIS — E10.29 TYPE 1 DIABETES MELLITUS WITH MICROALBUMINURIA (H): ICD-10-CM

## 2025-04-10 RX ORDER — PROCHLORPERAZINE 25 MG/1
SUPPOSITORY RECTAL
Qty: 9 EACH | Refills: 3 | Status: SHIPPED | OUTPATIENT
Start: 2025-04-10

## 2025-04-28 ENCOUNTER — NURSE TRIAGE (OUTPATIENT)
Dept: FAMILY MEDICINE | Facility: CLINIC | Age: 30
End: 2025-04-28
Payer: COMMERCIAL

## 2025-04-28 DIAGNOSIS — F41.9 ANXIETY: Primary | ICD-10-CM

## 2025-04-28 NOTE — TELEPHONE ENCOUNTER
RN Triage    Patient Contact    Attempt # 1    Was call answered?  No.  Left message on voicemail with information to call me back. and sent Sanjiv George RN on 4/28/2025 at 1:53 PM

## 2025-04-29 NOTE — TELEPHONE ENCOUNTER
Nurse Triage SBAR    Is this a 2nd Level Triage? NO    Situation: Patient was started on Prozac 40mg approximately 6 weeks ago and is feeling worse    Background: Has tried Wellbutrin in the past with no relief of depression or anxiety    Assessment: Patient reports she increased her dose to 40mg and is not sleeping very well and feels as though her depression and anxiety are worsening. She denies any suicidal or homicidal thoughts, no plan to harm herself or others. No weight changes.     Protocol Recommended Disposition:   See in Office Within 3 Days    Recommendation: Patient asking to start on something else and wean off Prozac    Routed to provider    Does the patient meet one of the following criteria for ADS visit consideration? No    Ary George RN on 4/29/2025 at 11:31 AM        Reason for Disposition   Depression is getting worse (e.g.,sleeping poorly, less able to do activities of daily living)    Additional Information   Negative: Patient attempted suicide   Negative: Patient is threatening suicide now   Negative: Violent behavior, or threatening to physically hurt or kill someone   Negative: Patient is very confused (disoriented, slurred speech) and no other adult (e.g., friend or family member) available   Negative: Difficult to awaken or acting confused (disoriented, slurred speech) and new-onset   Negative: Sounds like a life-threatening emergency to the triager   Negative: Suicide thoughts, threats, attempts, or questions   Negative: Questions or concerns about alcohol use, unhealthy alcohol use, binge drinking, intoxication, or withdrawal   Negative: Questions or concerns about substance use (drug use), unhealthy drug use, intoxication, or withdrawal   Negative: Anxiety is main problem or symptom   Negative: Depression and unable to do any of normal activities (e.g., self care, school, work; in comparison to baseline).   Negative: Very strange or confused behavior   Negative: Patient sounds very  sick or weak to the triager   Negative: Fever > 101 F  (38.3 C)   Negative: Sometimes has thoughts of suicide   Negative: Symptoms interfere with work or school    Protocols used: Depression-A-[place]

## 2025-04-30 RX ORDER — DULOXETIN HYDROCHLORIDE 30 MG/1
30 CAPSULE, DELAYED RELEASE ORAL DAILY
Qty: 60 CAPSULE | Refills: 0 | Status: SHIPPED | OUTPATIENT
Start: 2025-04-30

## 2025-06-15 DIAGNOSIS — F41.9 ANXIETY: ICD-10-CM

## 2025-06-15 DIAGNOSIS — I10 BENIGN ESSENTIAL HYPERTENSION: ICD-10-CM

## 2025-06-17 RX ORDER — DULOXETIN HYDROCHLORIDE 30 MG/1
30 CAPSULE, DELAYED RELEASE ORAL DAILY
Qty: 60 CAPSULE | Refills: 0 | Status: SHIPPED | OUTPATIENT
Start: 2025-06-17 | End: 2025-06-17

## 2025-06-17 RX ORDER — DULOXETIN HYDROCHLORIDE 30 MG/1
30 CAPSULE, DELAYED RELEASE ORAL 2 TIMES DAILY
Qty: 60 CAPSULE | Refills: 1 | Status: SHIPPED | OUTPATIENT
Start: 2025-06-17

## 2025-06-18 RX ORDER — LISINOPRIL 20 MG/1
20 TABLET ORAL 2 TIMES DAILY
Qty: 180 TABLET | Refills: 0 | Status: SHIPPED | OUTPATIENT
Start: 2025-06-18

## 2025-06-18 NOTE — TELEPHONE ENCOUNTER
"Last Visit: 3/25/2025 [facility]   Future Visit: 10/14/2025    Provider has HTN plan: BP addressed at last visit \"Continue Lisinopril 20 mg BID\"  - lisinopril (ZESTRIL) 20 MG tablet refill sent for 90 day supply and FYI to provider        Request from pharmacy:  Requested Prescriptions   Pending Prescriptions Disp Refills    lisinopril (ZESTRIL) 20 MG tablet [Pharmacy Med Name: Lisinopril 20 MG Oral Tablet] 180 tablet 0     Sig: Take 1 tablet by mouth twice daily       ACE Inhibitors (Including Combos) Protocol Failed - 6/18/2025  9:19 AM        Failed - Most recent blood pressure under 140/90 in past 12 months- Clinicial or Patient Reported     BP Readings from Last 3 Encounters:   03/25/25 (!) 146/85   11/12/24 132/78   03/20/24 (!) 162/88       No data recorded            Passed - Medication is active on med list and the sig matches. RN to manually verify dose and sig if red X/fail.     If the protocol passes (green check), you do not need to verify med dose and sig.    A prescription matches if they are the same clinical intention.    For Example: once daily and every morning are the same.    The protocol can not identify upper and lower case letters as matching and will fail.     For Example: Take 1 tablet (50 mg) by mouth daily     TAKE 1 TABLET (50 MG) BY MOUTH DAILY    For all fails (red x), verify dose and sig.    If the refill does match what is on file, the RN can still proceed to approve the refill request.       If they do not match, route to the appropriate provider.             Passed - Medication indicated for associated diagnosis     Medication is associated with one or more of the following diagnoses:     Chronic Kidney Disease (CKD)   Coronary Artery Disease (CAD)   Diabetes   Heart Failure (HF)   Hypertension (HTN)   Nephropathy   History of myocarditis   Tachycardia induced cardiomyopathy   STEMI (ST elevation myocardial infarction)   Spontaneous dissection of coronary " artery   Status post percutaneous transluminal coronary angioplasty   Cardiomyopathy            Passed - Recent (12 month) or future (90 days) visit with authorizing provider's specialty (provided they have been seen in the past 15 months)     The patient must have completed an in-person or virtual visit within the past 12 months or has a future visit scheduled within the next 90 days with the authorizing provider s specialty.  Urgent care and e-visits do not qualify as an office visit for this protocol.          Passed - Most recent GFR on file in the past 12 months >30        Passed - Patient is age 18 or older        Passed - No active pregnancy on record        Passed - Normal serum potassium on file in past 12 months     Recent Labs   Lab Test 03/25/25  1029   POTASSIUM 4.6             Passed - No positive pregnancy test within past 12 months

## 2025-08-26 DIAGNOSIS — F41.9 ANXIETY: ICD-10-CM

## 2025-08-26 RX ORDER — DULOXETIN HYDROCHLORIDE 30 MG/1
30 CAPSULE, DELAYED RELEASE ORAL 2 TIMES DAILY
Qty: 180 CAPSULE | Refills: 0 | Status: SHIPPED | OUTPATIENT
Start: 2025-08-26